# Patient Record
Sex: MALE | Race: WHITE | Employment: OTHER | URBAN - METROPOLITAN AREA
[De-identification: names, ages, dates, MRNs, and addresses within clinical notes are randomized per-mention and may not be internally consistent; named-entity substitution may affect disease eponyms.]

---

## 2017-01-23 ENCOUNTER — OFFICE VISIT (OUTPATIENT)
Dept: ONCOLOGY | Age: 72
End: 2017-01-23

## 2017-01-23 VITALS
OXYGEN SATURATION: 99 % | TEMPERATURE: 98.1 F | DIASTOLIC BLOOD PRESSURE: 71 MMHG | WEIGHT: 130 LBS | SYSTOLIC BLOOD PRESSURE: 145 MMHG | BODY MASS INDEX: 18.61 KG/M2 | HEIGHT: 70 IN | HEART RATE: 71 BPM | RESPIRATION RATE: 18 BRPM

## 2017-01-23 DIAGNOSIS — C32.9 LARYNGEAL SQUAMOUS CELL CARCINOMA (HCC): Primary | ICD-10-CM

## 2017-01-23 NOTE — PROGRESS NOTES
Rogerio Nash is a 66 y.o. male here for follow up had concerns including Follow-up. HIPAA verified by two patient identifiers. ,C/O INSOMNIA      Mr. Shona Harrison has a reminder for a \"due or due soon\" health maintenance. I have asked that he contact his primary care provider for follow-up on this health maintenance.

## 2017-01-23 NOTE — PROGRESS NOTES
Follow-up Note        Patient: Pilar Moon MRN: [id number]  SSN: [id number]    YOB: 1945  Age: 66 y.o. Sex: male        Diagnosis:     1. 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 Concurrent chemo/rads with cisplatin, completed 10/2016      Subjective:      Pilar Moon is a 66 y.o. male with a diagnosis of squamous cell laryngeal carcinoma. He was diagnosed by Dr. Alicia in July 2016. He has long history of pipe smoking. He quit all smoking more than 30 years ago. Mr. Jean Paul Duarte received concurrent radiation with weekly Cisplatin. CT shows resolving laryngeal mass. Laryngoscopy by Dr. Alicia on 1/19/2017 shows no tumor. He notes some taste alterations and excess mucus but overall feels well. Review of Systems:     Constitutional: negative  Eyes: negative  Ears, Nose, Mouth, Throat, and Face: none  Respiratory: negative  Cardiovascular: negative  Gastrointestinal: poor taste  Genitourinary:negative  Integument/Breast: negative  Hematologic/Lymphatic: negative  Musculoskeletal:negative  Neurological: negative      Past Medical History   Diagnosis Date    HTN (hypertension)     Hypercholesterolemia     Hyperlipidemia      Past Surgical History   Procedure Laterality Date    Hx orthopaedic       Back surgery x3      Family History   Problem Relation Age of Onset    Dementia Mother     Alcohol abuse Father      Social History   Substance Use Topics    Smoking status: Former Smoker    Smokeless tobacco: Never Used    Alcohol use No      Prior to Admission medications    Medication Sig Start Date End Date Taking?  Authorizing Provider   pravastatin (PRAVACHOL) 40 mg tablet TAKE ONE TABLET BY MOUTH ONCE DAILY FOR CHOLESTEROL 10/31/16  Yes Jered Ramos MD   ALPRAZolam Acosta Re) 1 mg tablet TAKE ONE-HALF TO ONE TABLET BY MOUTH EVERY 12 HOURS AS NEEDED 9/13/16  Yes Jered Ramos MD   ondansetron (ZOFRAN ODT) 4 mg disintegrating tablet Take 1 Tab by mouth every eight (8) hours as needed for Nausea. 9/6/16  Yes Angela Nones, NP   fluticasone (FLONASE) 50 mcg/actuation nasal spray USE TWO SPRAY(S) IN EACH NOSTRIL ONCE DAILY 7/6/16  Yes Juventino Galvez MD   amLODIPine (NORVASC) 5 mg tablet TAKE ONE TABLET BY MOUTH ONCE DAILY FOR BLOOD PRESSURE 2/1/16  Yes Juventino Galvez MD   hydrochlorothiazide (HYDRODIURIL) 25 mg tablet TAKE ONE TABLET BY MOUTH ONCE DAILY FOR BLOOD PRESSURE 10/14/15  Yes Juventino Galvez MD          Allergies   Allergen Reactions    Morphine Rash    Lisinopril Other (comments)     dizziness    Propranolol Other (comments)     fatigue           Objective:     Vitals:    01/23/17 1048   BP: 145/71   Pulse: 71   Resp: 18   Temp: 98.1 °F (36.7 °C)   SpO2: 99%   Weight: 130 lb (59 kg)   Height: 5' 10\" (1.778 m)        Physical Exam:    GENERAL: alert, cooperative  HEENT: throat dry  LYMPHATIC: Cervical, supraclavicular, and axillary nodes normal.   THROAT & NECK:   LUNG: clear to auscultation bilaterally  HEART: regular rate and rhythm  ABDOMEN: soft, non-tender  EXTREMITIES: no cyanosis or edema  SKIN: Normal.  NEUROLOGIC: negative        Assessment:     1. 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 Completed concurrent chemo/rads 10/2016   Cisplatin weekly X 6    CT shows excellent response  Laryngoscopy on 1/19/2017 shows no tumor  Asymptomatic  In remission        Plan:       > PET CT  > CT neck in 3 months  > Follow-up appointment in 3 months         Signed by: Hay Causey MD                     January 23, 2017          CC. Adrienne Man MD ([facility])  CC. Juventino Galvez MD  CC.  Rodriguez Figueroa MD

## 2017-02-16 ENCOUNTER — HOSPITAL ENCOUNTER (OUTPATIENT)
Dept: PET IMAGING | Age: 72
Discharge: HOME | End: 2017-02-16
Attending: INTERNAL MEDICINE
Payer: MEDICARE

## 2017-02-16 ENCOUNTER — HOSPITAL ENCOUNTER (OUTPATIENT)
Dept: CT IMAGING | Age: 72
Discharge: HOME | End: 2017-02-16
Attending: INTERNAL MEDICINE
Payer: MEDICARE

## 2017-02-16 DIAGNOSIS — C32.9 LARYNGEAL SQUAMOUS CELL CARCINOMA (HCC): ICD-10-CM

## 2017-02-16 LAB — CREAT BLD-MCNC: 0.8 MG/DL (ref 0.6–1.3)

## 2017-02-16 PROCEDURE — 74011250636 HC RX REV CODE- 250/636: Performed by: INTERNAL MEDICINE

## 2017-02-16 PROCEDURE — A9552 F18 FDG: HCPCS

## 2017-02-16 PROCEDURE — 70491 CT SOFT TISSUE NECK W/DYE: CPT

## 2017-02-16 PROCEDURE — 74011636320 HC RX REV CODE- 636/320: Performed by: INTERNAL MEDICINE

## 2017-02-16 PROCEDURE — 82565 ASSAY OF CREATININE: CPT

## 2017-02-16 RX ORDER — SODIUM CHLORIDE 9 MG/ML
50 INJECTION, SOLUTION INTRAVENOUS
Status: COMPLETED | OUTPATIENT
Start: 2017-02-16 | End: 2017-02-16

## 2017-02-16 RX ORDER — SODIUM CHLORIDE 0.9 % (FLUSH) 0.9 %
10 SYRINGE (ML) INJECTION
Status: COMPLETED | OUTPATIENT
Start: 2017-02-16 | End: 2017-02-16

## 2017-02-16 RX ADMIN — Medication 10 ML: at 09:04

## 2017-02-16 RX ADMIN — SODIUM CHLORIDE 50 ML/HR: 900 INJECTION, SOLUTION INTRAVENOUS at 09:04

## 2017-02-16 RX ADMIN — IOPAMIDOL 100 ML: 612 INJECTION, SOLUTION INTRAVENOUS at 09:04

## 2017-02-16 RX ADMIN — Medication 10 ML: at 10:00

## 2017-03-10 ENCOUNTER — OFFICE VISIT (OUTPATIENT)
Dept: FAMILY MEDICINE CLINIC | Age: 72
End: 2017-03-10

## 2017-03-10 VITALS
WEIGHT: 130 LBS | OXYGEN SATURATION: 95 % | HEART RATE: 79 BPM | HEIGHT: 70 IN | DIASTOLIC BLOOD PRESSURE: 69 MMHG | TEMPERATURE: 96.3 F | BODY MASS INDEX: 18.61 KG/M2 | SYSTOLIC BLOOD PRESSURE: 123 MMHG | RESPIRATION RATE: 20 BRPM

## 2017-03-10 DIAGNOSIS — I10 ESSENTIAL HYPERTENSION: ICD-10-CM

## 2017-03-10 DIAGNOSIS — C32.9 LARYNGEAL SQUAMOUS CELL CARCINOMA (HCC): ICD-10-CM

## 2017-03-10 DIAGNOSIS — J06.9 UPPER RESPIRATORY TRACT INFECTION, UNSPECIFIED TYPE: ICD-10-CM

## 2017-03-10 DIAGNOSIS — B37.0 ORAL CANDIDA: Primary | ICD-10-CM

## 2017-03-10 LAB
S PYO AG THROAT QL: NEGATIVE
VALID INTERNAL CONTROL?: YES

## 2017-03-10 RX ORDER — PROMETHAZINE HYDROCHLORIDE AND CODEINE PHOSPHATE 6.25; 1 MG/5ML; MG/5ML
5 SOLUTION ORAL
Qty: 180 ML | Refills: 0 | Status: SHIPPED | OUTPATIENT
Start: 2017-03-10 | End: 2017-04-24

## 2017-03-10 RX ORDER — NYSTATIN 100000 [USP'U]/ML
500000 SUSPENSION ORAL 4 TIMES DAILY
Qty: 473 ML | Refills: 0 | Status: SHIPPED | OUTPATIENT
Start: 2017-03-10 | End: 2017-03-24

## 2017-03-10 RX ORDER — AZITHROMYCIN 250 MG/1
TABLET, FILM COATED ORAL
Qty: 6 TAB | Refills: 0 | Status: SHIPPED | OUTPATIENT
Start: 2017-03-10 | End: 2017-03-15

## 2017-03-10 NOTE — PATIENT INSTRUCTIONS

## 2017-03-10 NOTE — PROGRESS NOTES
HISTORY OF PRESENT ILLNESS  Mary Caputo is a 66 y.o. male. HPI  Patient comes in today for dry, sore mouth. States he had radiation treatment and chemo in Sept/Oct 2016 for throat cancer. Has had dry mouth since radiation treatment. Has been using MuGard during RT and using biotene rinses. Also complains of cough, states he coughs all night, not sleeping. Trying te seltzer plus, nyquil with minimal relief. Appetite fair to poor due to dryness and soreness of mouth/throat. Allergies   Allergen Reactions    Morphine Rash    Lisinopril Other (comments)     dizziness    Propranolol Other (comments)     fatigue       Past Medical History:   Diagnosis Date    HTN (hypertension)     Hypercholesterolemia     Hyperlipidemia     Laryngeal squamous cell carcinoma ([facility] [address] 2016       Past Surgical History:   Procedure Laterality Date    HX ORTHOPAEDIC      Back surgery x3       Social History     Social History    Marital status:      Spouse name: N/A    Number of children: N/A    Years of education: N/A     Occupational History    Not on file. Social History Main Topics    Smoking status: Former Smoker    Smokeless tobacco: Never Used    Alcohol use No    Drug use: No    Sexual activity: Yes     Partners: Female     Other Topics Concern    Not on file     Social History Narrative       Family History   Problem Relation Age of Onset    Dementia Mother     Alcohol abuse Father        Current Outpatient Prescriptions   Medication Sig    amLODIPine (NORVASC) 5 mg tablet TAKE ONE TABLET BY MOUTH ONCE DAILY FOR BLOOD PRESSURE    pravastatin (PRAVACHOL) 40 mg tablet TAKE ONE TABLET BY MOUTH ONCE DAILY FOR CHOLESTEROL    ALPRAZolam (XANAX) 1 mg tablet TAKE ONE-HALF TO ONE TABLET BY MOUTH EVERY 12 HOURS AS NEEDED    ondansetron (ZOFRAN ODT) 4 mg disintegrating tablet Take 1 Tab by mouth every eight (8) hours as needed for Nausea.     fluticasone (FLONASE) 50 mcg/actuation nasal spray USE TWO SPRAY(S) IN EACH NOSTRIL ONCE DAILY    hydrochlorothiazide (HYDRODIURIL) 25 mg tablet TAKE ONE TABLET BY MOUTH ONCE DAILY FOR BLOOD PRESSURE     No current facility-administered medications for this visit. Review of Systems   Constitutional: Positive for weight loss. Negative for chills, diaphoresis, fever and malaise/fatigue. HENT: Positive for sore throat. Negative for congestion, ear pain and tinnitus. Respiratory: Positive for cough, sputum production and shortness of breath. Negative for wheezing. Cardiovascular: Negative for chest pain and palpitations. Gastrointestinal: Negative for abdominal pain, diarrhea, nausea and vomiting. Genitourinary: Negative for dysuria, flank pain, frequency, hematuria and urgency. Musculoskeletal: Negative for myalgias. Skin: Negative. Neurological: Negative for dizziness, tingling, sensory change, speech change and focal weakness. Psychiatric/Behavioral: Negative for depression. The patient is not nervous/anxious and does not have insomnia. Vitals:    03/10/17 0914   BP: 123/69   Pulse: 79   Resp: 20   Temp: 96.3 °F (35.7 °C)   TempSrc: Oral   SpO2: 95%   Weight: 130 lb (59 kg)   Height: 5' 10\" (1.778 m)     Physical Exam   Constitutional: He is oriented to person, place, and time. He appears well-developed. He appears cachectic. He is cooperative. HENT:   Mouth/Throat: Uvula is midline. Mucous membranes are dry. White patches on tongue   Cardiovascular: Normal rate, regular rhythm and normal heart sounds. Pulmonary/Chest: Effort normal. He has no decreased breath sounds. He has no wheezes. He has rhonchi. Neurological: He is alert and oriented to person, place, and time. Skin: Skin is warm and dry. Psychiatric: He has a normal mood and affect. His behavior is normal. Judgment and thought content normal.     ASSESSMENT and PLAN    ICD-10-CM ICD-9-CM    1.  Oral candida B37.0 112.0 nystatin (MYCOSTATIN) 100,000 unit/mL suspension 2. Upper respiratory tract infection, unspecified type J06.9 465.9 AMB POC RAPID STREP A      azithromycin (ZITHROMAX) 250 mg tablet      promethazine-codeine (PHENERGAN WITH CODEINE) 6.25-10 mg/5 mL syrup   3. Essential hypertension I10 401.9 CBC WITH AUTOMATED DIFF      METABOLIC PANEL, COMPREHENSIVE      LIPID PANEL   4. Laryngeal squamous cell carcinoma (HCC) C32.9 161.9 THYROID PANEL W/TSH      CBC WITH AUTOMATED DIFF     Encounter Diagnoses   Name Primary?  Oral candida Yes    Upper respiratory tract infection, unspecified type     Essential hypertension     Laryngeal squamous cell carcinoma (HCC)      Orders Placed This Encounter    THYROID PANEL W/TSH    CBC WITH AUTOMATED DIFF    METABOLIC PANEL, COMPREHENSIVE    LIPID PANEL    CVD REPORT    AMB POC RAPID STREP A    azithromycin (ZITHROMAX) 250 mg tablet    nystatin (MYCOSTATIN) 100,000 unit/mL suspension    promethazine-codeine (PHENERGAN WITH CODEINE) 6.25-10 mg/5 mL syrup     Hermann Cedeno was seen today for sore throat. Diagnoses and all orders for this visit:    Oral candida - due to dry mouth secondary to radiation treatment for laryngeal SCC. Continue Biotene rinses, add nystatin S&S for 2 weeks  -     nystatin (MYCOSTATIN) 100,000 unit/mL suspension; Take 5 mL by mouth four (4) times daily for 14 days. swish and spit    Upper respiratory tract infection, unspecified type  -     AMB POC RAPID STREP A  -     azithromycin (ZITHROMAX) 250 mg tablet; Take 2 tablets today, then take 1 tablet daily  -     promethazine-codeine (PHENERGAN WITH CODEINE) 6.25-10 mg/5 mL syrup; Take 5 mL by mouth every six (6) hours as needed for Cough. Max Daily Amount: 20 mL. Essential hypertension - stable  -     CBC WITH AUTOMATED DIFF  -     METABOLIC PANEL, COMPREHENSIVE  -     LIPID PANEL    Laryngeal squamous cell carcinoma (HCC) - per oncology and rad oncology. Will check TFTs due to radiation treatment to neck area.   -     THYROID PANEL W/TSH  - CBC WITH AUTOMATED DIFF      Follow-up Disposition:  Return if symptoms worsen or fail to improve. I have reviewed the patient's allergies and made any necessary changes. Medical, procedural, social and family histories have been reviewed and updated as medically indicated. I have reconciled and/or revised patient medications in the EMR. I have discussed each diagnosis listed in this note with Serafin Anderson and/or their family. I have discussed treatment options and the risk/benefit analysis of those options, including safe use of medications and possible medication side effects. Through the use of shared decision making we have agreed to the above plan. The patient has received an after-visit summary and questions were answered concerning future plans. Payal Edge, KRIS-C    This note will not be viewable in [employer].

## 2017-03-10 NOTE — MR AVS SNAPSHOT
Visit Information Date & Time Provider Department Dept. Phone Encounter #  
 3/10/2017  9:00 AM Shalom Leblanc [phone] [id number] Follow-up Instructions Return if symptoms worsen or fail to improve. Your Appointments 4/24/2017  9:00 AM  
ESTABLISHED PATIENT with Darleen Mcclelland MD  
[address] [facility]) Appt Note: 3 mth f/u; 3 mth f/u  
 [address] [facility] [address] [phone]  
[address] [place] [address] [address] [facility] [address] Toby Ward Upcoming Health Maintenance Date Due  
 GLAUCOMA SCREENING Q2Y 6/9/2009 MEDICARE YEARLY EXAM 6/9/2009 COLONOSCOPY 6/30/2017* Pneumococcal 65+ High/Highest Risk (2 of 2 - PPSV23) 5/5/2017 DTaP/Tdap/Td series (2 - Td) 10/14/2025 *Topic was postponed. The date shown is not the original due date. Allergies as of 3/10/2017  Review Complete On: 2/16/2017 By: Brodie Dennis Severity Noted Reaction Type Reactions Morphine High 03/08/2010    Rash Lisinopril  09/07/2011   Side Effect Other (comments)  
 dizziness Propranolol  03/09/2011   Side Effect Other (comments)  
 fatigue Current Immunizations  Reviewed on 10/18/2016 Name Date Tdap 10/14/2015 Not reviewed this visit You Were Diagnosed With   
  
 Codes Comments Sore throat    -  Primary ICD-10-CM: J02.9 ICD-9-CM: 363 Upper respiratory tract infection, unspecified type     ICD-10-CM: J06.9 ICD-9-CM: 465.9 Oral candida     ICD-10-CM: B37.0 ICD-9-CM: 112.0 Essential hypertension     ICD-10-CM: I10 
ICD-9-CM: 401.9 Laryngeal squamous cell carcinoma (HCC)     ICD-10-CM: C32.9 ICD-9-CM: 161.9 Vitals BP Pulse Temp Resp Height(growth percentile) Weight(growth percentile) 123/69 79 96.3 °F (35.7 °C) (Oral) 20 5' 10\" (1.778 m) 130 lb (59 kg) SpO2 BMI Smoking Status 95% 18.65 kg/m2 Former Smoker BMI and BSA Data Body Mass Index Body Surface Area  
 18.65 kg/m 2 1.71 m 2 Preferred Pharmacy Pharmacy Name Phone [facility] [address] [phone] Your Updated Medication List  
  
   
This list is accurate as of: 3/10/17 10:18 AM.  Always use your most recent med list.  
  
  
  
  
 ALPRAZolam 1 mg tablet Commonly known as:  XANAX  
TAKE ONE-HALF TO ONE TABLET BY MOUTH EVERY 12 HOURS AS NEEDED  
  
 amLODIPine 5 mg tablet Commonly known as:  Faith Kristy TAKE ONE TABLET BY MOUTH ONCE DAILY FOR BLOOD PRESSURE  
  
 azithromycin 250 mg tablet Commonly known as:  Niaveronica Blander Take 2 tablets today, then take 1 tablet daily  
  
 fluticasone 50 mcg/actuation nasal spray Commonly known as:  FLONASE  
USE TWO SPRAY(S) IN EACH NOSTRIL ONCE DAILY  
  
 hydroCHLOROthiazide 25 mg tablet Commonly known as:  HYDRODIURIL  
TAKE ONE TABLET BY MOUTH ONCE DAILY FOR BLOOD PRESSURE  
  
 nystatin 100,000 unit/mL suspension Commonly known as:  MYCOSTATIN Take 5 mL by mouth four (4) times daily for 14 days. swish and spit  
  
 pravastatin 40 mg tablet Commonly known as:  PRAVACHOL  
TAKE ONE TABLET BY MOUTH ONCE DAILY FOR CHOLESTEROL  
  
 promethazine-codeine 6.25-10 mg/5 mL syrup Commonly known as:  PHENERGAN with CODEINE Take 5 mL by mouth every six (6) hours as needed for Cough. Max Daily Amount: 20 mL. Prescriptions Printed Refills  
 promethazine-codeine (PHENERGAN WITH CODEINE) 6.25-10 mg/5 mL syrup 0 Sig: Take 5 mL by mouth every six (6) hours as needed for Cough. Max Daily Amount: 20 mL. Class: Print Route: Oral  
  
Prescriptions Sent to Pharmacy Refills  
 azithromycin (ZITHROMAX) 250 mg tablet 0 Sig: Take 2 tablets today, then take 1 tablet daily  Class: Normal  
 Pharmacy: [facility] [address] , [address] Ph #: [phone]  
 nystatin (MYCOSTATIN) 100,000 unit/mL suspension 0 Sig: Take 5 mL by mouth four (4) times daily for 14 days. swish and spit Class: Normal  
 Pharmacy: [address] , [address] [place] [address] Avenue Ph #: [phone] Route: Oral  
  
We Performed the Following AMB POC RAPID STREP A [48245 CPT(R)] CBC WITH AUTOMATED DIFF [69208 CPT(R)] LIPID PANEL [34898 CPT(R)] METABOLIC PANEL, COMPREHENSIVE [45691 CPT(R)] THYROID PANEL W/TSH [45105 CPT(R)] Follow-up Instructions Return if symptoms worsen or fail to improve. Patient Instructions Upper Respiratory Infection (Cold): Care Instructions Your Care Instructions An upper respiratory infection, or URI, is an infection of the nose, sinuses, or throat. URIs are spread by coughs, sneezes, and direct contact. The common cold is the most frequent kind of URI. The flu and sinus infections are other kinds of URIs. Almost all URIs are caused by viruses. Antibiotics won't cure them. But you can treat most infections with home care. This may include drinking lots of fluids and taking over-the-counter pain medicine. You will probably feel better in 4 to 10 days. The doctor has checked you carefully, but problems can develop later. If you notice any problems or new symptoms, get medical treatment right away. Follow-up care is a key part of your treatment and safety. Be sure to make and go to all appointments, and call your doctor if you are having problems. It's also a good idea to know your test results and keep a list of the medicines you take. How can you care for yourself at home? · To prevent dehydration, drink plenty of fluids, enough so that your urine is light yellow or clear like water. Choose water and other caffeine-free clear liquids until you feel better.  If you have kidney, heart, or liver disease and have to limit fluids, talk with your doctor before you increase the amount of fluids you drink. · Take an over-the-counter pain medicine, such as acetaminophen (Tylenol), ibuprofen (Advil, Motrin), or naproxen (Aleve). Read and follow all instructions on the label. · Before you use cough and cold medicines, check the label. These medicines may not be safe for young children or for people with certain health problems. · Be careful when taking over-the-counter cold or flu medicines and Tylenol at the same time. Many of these medicines have acetaminophen, which is Tylenol. Read the labels to make sure that you are not taking more than the recommended dose. Too much acetaminophen (Tylenol) can be harmful. · Get plenty of rest. 
· Do not smoke or allow others to smoke around you. If you need help quitting, talk to your doctor about stop-smoking programs and medicines. These can increase your chances of quitting for good. When should you call for help? Call 911 anytime you think you may need emergency care. For example, call if: 
· You have severe trouble breathing. Call your doctor now or seek immediate medical care if: 
· You seem to be getting much sicker. · You have new or worse trouble breathing. · You have a new or higher fever. · You have a new rash. Watch closely for changes in your health, and be sure to contact your doctor if: 
· You have a new symptom, such as a sore throat, an earache, or sinus pain. · You cough more deeply or more often, especially if you notice more mucus or a change in the color of your mucus. · You do not get better as expected. Where can you learn more? Go to http://dora-vicki.info/. Enter S293 in the search box to learn more about \"Upper Respiratory Infection (Cold): Care Instructions. \" Current as of: June 30, 2016 Content Version: 11.1 © 5754-6056 [employer].  Care instructions adapted under license by [employer] (which disclaims liability or warranty for this information). If you have questions about a medical condition or this instruction, always ask your healthcare professional. [address] any warranty or liability for your use of this information. Introducing [facility]! Dear Easton Wu: Thank you for requesting a [employer] account. Our records indicate that you already have an active [employer] account. You can access your account anytime at [email or web address] Pro Did you know that you can access your hospital and ER discharge instructions at any time in [employer]? You can also review all of your test results from your hospital stay or ER visit. Additional Information If you have questions, please visit the Frequently Asked Questions section of the [employer] website at [email or web address] Remember, [employer] is NOT to be used for urgent needs. For medical emergencies, dial 911. Now available from your iPhone and Android! Please provide this summary of care documentation to your next provider. Your primary care clinician is listed as Dorethea Hamman. If you have any questions after today's visit, please call [phone].

## 2017-03-11 LAB
ALBUMIN SERPL-MCNC: 4 G/DL (ref 3.5–4.8)
ALBUMIN/GLOB SERPL: 1.3 {RATIO} (ref 1.1–2.5)
ALP SERPL-CCNC: 57 IU/L (ref 39–117)
ALT SERPL-CCNC: 20 IU/L (ref 0–44)
AST SERPL-CCNC: 25 IU/L (ref 0–40)
BASOPHILS # BLD AUTO: 0 X10E3/UL (ref 0–0.2)
BASOPHILS NFR BLD AUTO: 0 %
BILIRUB SERPL-MCNC: 0.3 MG/DL (ref 0–1.2)
BUN SERPL-MCNC: 9 MG/DL (ref 8–27)
BUN/CREAT SERPL: 15 (ref 10–22)
CALCIUM SERPL-MCNC: 9.3 MG/DL (ref 8.6–10.2)
CHLORIDE SERPL-SCNC: 83 MMOL/L (ref 96–106)
CHOLEST SERPL-MCNC: 134 MG/DL (ref 100–199)
CO2 SERPL-SCNC: 25 MMOL/L (ref 18–29)
CREAT SERPL-MCNC: 0.62 MG/DL (ref 0.76–1.27)
EOSINOPHIL # BLD AUTO: 0.2 X10E3/UL (ref 0–0.4)
EOSINOPHIL NFR BLD AUTO: 2 %
ERYTHROCYTE [DISTWIDTH] IN BLOOD BY AUTOMATED COUNT: 13.3 % (ref 12.3–15.4)
FT4I SERPL CALC-MCNC: 1.7 (ref 1.2–4.9)
GLOBULIN SER CALC-MCNC: 3.1 G/DL (ref 1.5–4.5)
GLUCOSE SERPL-MCNC: 96 MG/DL (ref 65–99)
HCT VFR BLD AUTO: 34.8 % (ref 37.5–51)
HDLC SERPL-MCNC: 30 MG/DL
HGB BLD-MCNC: 12.3 G/DL (ref 12.6–17.7)
IMM GRANULOCYTES # BLD: 0 X10E3/UL (ref 0–0.1)
IMM GRANULOCYTES NFR BLD: 0 %
INTERPRETATION, 910389: NORMAL
LDLC SERPL CALC-MCNC: 84 MG/DL (ref 0–99)
LYMPHOCYTES # BLD AUTO: 1 X10E3/UL (ref 0.7–3.1)
LYMPHOCYTES NFR BLD AUTO: 13 %
MCH RBC QN AUTO: 30.4 PG (ref 26.6–33)
MCHC RBC AUTO-ENTMCNC: 35.3 G/DL (ref 31.5–35.7)
MCV RBC AUTO: 86 FL (ref 79–97)
MONOCYTES # BLD AUTO: 0.2 X10E3/UL (ref 0.1–0.9)
MONOCYTES NFR BLD AUTO: 2 %
NEUTROPHILS # BLD AUTO: 6.2 X10E3/UL (ref 1.4–7)
NEUTROPHILS NFR BLD AUTO: 83 %
PLATELET # BLD AUTO: 276 X10E3/UL (ref 150–379)
POTASSIUM SERPL-SCNC: 4.5 MMOL/L (ref 3.5–5.2)
PROT SERPL-MCNC: 7.1 G/DL (ref 6–8.5)
RBC # BLD AUTO: 4.04 X10E6/UL (ref 4.14–5.8)
SODIUM SERPL-SCNC: 125 MMOL/L (ref 134–144)
T3RU NFR SERPL: 30 % (ref 24–39)
T4 SERPL-MCNC: 5.6 UG/DL (ref 4.5–12)
TRIGL SERPL-MCNC: 100 MG/DL (ref 0–149)
TSH SERPL DL<=0.005 MIU/L-ACNC: 6.4 UIU/ML (ref 0.45–4.5)
VLDLC SERPL CALC-MCNC: 20 MG/DL (ref 5–40)
WBC # BLD AUTO: 7.5 X10E3/UL (ref 3.4–10.8)

## 2017-04-24 ENCOUNTER — OFFICE VISIT (OUTPATIENT)
Dept: ONCOLOGY | Age: 72
End: 2017-04-24

## 2017-04-24 VITALS
WEIGHT: 128.8 LBS | DIASTOLIC BLOOD PRESSURE: 75 MMHG | RESPIRATION RATE: 18 BRPM | BODY MASS INDEX: 18.44 KG/M2 | OXYGEN SATURATION: 99 % | SYSTOLIC BLOOD PRESSURE: 132 MMHG | TEMPERATURE: 97.4 F | HEIGHT: 70 IN | HEART RATE: 70 BPM

## 2017-04-24 DIAGNOSIS — C32.9 LARYNGEAL SQUAMOUS CELL CARCINOMA (HCC): Primary | ICD-10-CM

## 2017-04-24 NOTE — PROGRESS NOTES
Guille Doe is a 66 y.o. male had concerns including Follow-up. for Laryngeal Squamos cell carcinoma, HIPAA verified by two patient identifiers. C/o mild fatigue,and sob upon exertion, denies pain. Mr. Clari Hardin has a reminder for a \"due or due soon\" health maintenance. I have asked that he contact his primary care provider for follow-up on this health maintenance.

## 2017-04-24 NOTE — PROGRESS NOTES
Follow-up Note        Patient: Barrett Whiteside MRN: [id number]  SSN: [id number]    YOB: 1945  Age: 66 y.o. Sex: male        Diagnosis:     1. 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 Concurrent chemo/rads with cisplatin, completed 10/2016    Subjective:      Barrett Whiteside is a 66 y.o. male with a diagnosis of squamous cell laryngeal carcinoma. He was diagnosed by Dr. Alicia in July 2016. He has long history of pipe smoking. He quit all smoking more than 30 years ago. Mr. Nazia Severino received concurrent radiation with weekly Cisplatin. CT shows resolving laryngeal mass. Laryngoscopy by Dr. Alicia on 1/19/2017 shows no tumor. PET CT shows complete metabolic response. He notes some taste alterations and excess oral mucus but overall feels well. Review of Systems:     Constitutional: negative  Eyes: negative  Ears, Nose, Mouth, Throat, and Face: none  Respiratory: negative  Cardiovascular: negative  Gastrointestinal: poor taste  Genitourinary:negative  Integument/Breast: negative  Hematologic/Lymphatic: negative  Musculoskeletal:negative  Neurological: negative      Past Medical History:   Diagnosis Date    HTN (hypertension)     Hypercholesterolemia     Hyperlipidemia     Laryngeal squamous cell carcinoma ([facility] [address] 2016     Past Surgical History:   Procedure Laterality Date    HX ORTHOPAEDIC      Back surgery x3      Family History   Problem Relation Age of Onset    Dementia Mother     Alcohol abuse Father      Social History   Substance Use Topics    Smoking status: Former Smoker    Smokeless tobacco: Never Used    Alcohol use No      Prior to Admission medications    Medication Sig Start Date End Date Taking?  Authorizing Provider   amLODIPine (NORVASC) 5 mg tablet TAKE ONE TABLET BY MOUTH ONCE DAILY FOR BLOOD PRESSURE 1/26/17  Yes Leydi Morales MD   pravastatin (PRAVACHOL) 40 mg tablet TAKE ONE TABLET BY MOUTH ONCE DAILY FOR CHOLESTEROL 1/26/17  Yes Jonathan Richardson MD   ALPRAZolam Bree Stager) 1 mg tablet TAKE ONE-HALF TO ONE TABLET BY MOUTH EVERY 12 HOURS AS NEEDED 9/13/16  Yes Jonathan Richardson MD   hydrochlorothiazide (HYDRODIURIL) 25 mg tablet TAKE ONE TABLET BY MOUTH ONCE DAILY FOR BLOOD PRESSURE 10/14/15  Yes Jonathan Richardson MD          Allergies   Allergen Reactions    Morphine Rash    Lisinopril Other (comments)     dizziness    Propranolol Other (comments)     fatigue           Objective:     Vitals:    04/24/17 0853   BP: 132/75   Pulse: 70   Resp: 18   Temp: 97.4 °F (36.3 °C)   SpO2: 99%   Weight: 128 lb 12.8 oz (58.4 kg)   Height: 5' 10\" (1.778 m)        Physical Exam:    GENERAL: alert, cooperative  HEENT: throat dry  LYMPHATIC: Cervical, supraclavicular, and axillary nodes normal.   THROAT & NECK: oral thrush  LUNG: clear to auscultation bilaterally  HEART: regular rate and rhythm  ABDOMEN: soft, non-tender  EXTREMITIES: no cyanosis or edema  SKIN: Normal.  NEUROLOGIC: negative        Assessment:     1. 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 Completed concurrent chemo/rads 10/2016   Cisplatin weekly X 6    CT shows excellent response  Laryngoscopy on 1/19/2017 shows no tumor  PET CT - complete remission  Asymptomatic  In remission      2. Oral Thrush    Use nystatin swish and spit (has it at home)      Plan:       > Nystatin  > port removal  > Repeat CT soft tissue neck and CT chest   > Follow-up appointment in 3 months         Signed by: Junior Jose MD                     April 24, 2017          CC. Barrett Tsai MD ([facility])  CC. Jonathan Richardson MD  CC.  Rickey Nix MD

## 2017-04-25 RX ORDER — ALPRAZOLAM 1 MG/1
TABLET ORAL
Qty: 60 TAB | Refills: 0 | Status: SHIPPED | OUTPATIENT
Start: 2017-04-25 | End: 2017-08-29 | Stop reason: SDUPTHER

## 2017-04-26 ENCOUNTER — TELEPHONE (OUTPATIENT)
Dept: FAMILY MEDICINE CLINIC | Age: 72
End: 2017-04-26

## 2017-06-30 DIAGNOSIS — C32.9 LARYNGEAL SQUAMOUS CELL CARCINOMA (HCC): Primary | ICD-10-CM

## 2017-07-05 ENCOUNTER — HOSPITAL ENCOUNTER (OUTPATIENT)
Dept: LAB | Age: 72
Discharge: HOME | End: 2017-07-05
Payer: MEDICARE

## 2017-07-05 PROCEDURE — 85025 COMPLETE CBC W/AUTO DIFF WBC: CPT

## 2017-07-05 PROCEDURE — 36415 COLL VENOUS BLD VENIPUNCTURE: CPT

## 2017-07-05 PROCEDURE — 80053 COMPREHEN METABOLIC PANEL: CPT

## 2017-07-06 ENCOUNTER — HOSPITAL ENCOUNTER (OUTPATIENT)
Dept: CT IMAGING | Age: 72
Discharge: HOME | End: 2017-07-06
Attending: INTERNAL MEDICINE
Payer: MEDICARE

## 2017-07-06 ENCOUNTER — HOSPITAL ENCOUNTER (OUTPATIENT)
Dept: INTERVENTIONAL RADIOLOGY/VASCULAR | Age: 72
Discharge: HOME | End: 2017-07-06
Attending: INTERNAL MEDICINE
Payer: MEDICARE

## 2017-07-06 VITALS
HEART RATE: 53 BPM | DIASTOLIC BLOOD PRESSURE: 54 MMHG | BODY MASS INDEX: 18.61 KG/M2 | SYSTOLIC BLOOD PRESSURE: 120 MMHG | RESPIRATION RATE: 20 BRPM | WEIGHT: 130 LBS | TEMPERATURE: 97.5 F | HEIGHT: 70 IN | OXYGEN SATURATION: 100 %

## 2017-07-06 DIAGNOSIS — C32.9 LARYNGEAL SQUAMOUS CELL CARCINOMA (HCC): ICD-10-CM

## 2017-07-06 LAB
ALBUMIN SERPL-MCNC: 4.4 G/DL (ref 3.5–4.8)
ALBUMIN/GLOB SERPL: 1.5 {RATIO} (ref 1.2–2.2)
ALP SERPL-CCNC: 66 IU/L (ref 39–117)
ALT SERPL-CCNC: 14 IU/L (ref 0–44)
AST SERPL-CCNC: 25 IU/L (ref 0–40)
BASOPHILS # BLD AUTO: 0 X10E3/UL (ref 0–0.2)
BASOPHILS NFR BLD AUTO: 1 %
BILIRUB SERPL-MCNC: 0.2 MG/DL (ref 0–1.2)
BUN SERPL-MCNC: 15 MG/DL (ref 8–27)
BUN/CREAT SERPL: 20 (ref 10–24)
CALCIUM SERPL-MCNC: 9.4 MG/DL (ref 8.6–10.2)
CHLORIDE SERPL-SCNC: 95 MMOL/L (ref 96–106)
CO2 SERPL-SCNC: 25 MMOL/L (ref 18–29)
CREAT BLD-MCNC: 0.8 MG/DL (ref 0.6–1.3)
CREAT SERPL-MCNC: 0.76 MG/DL (ref 0.76–1.27)
EOSINOPHIL # BLD AUTO: 0.4 X10E3/UL (ref 0–0.4)
EOSINOPHIL NFR BLD AUTO: 5 %
ERYTHROCYTE [DISTWIDTH] IN BLOOD BY AUTOMATED COUNT: 13.3 % (ref 12.3–15.4)
GLOBULIN SER CALC-MCNC: 2.9 G/DL (ref 1.5–4.5)
GLUCOSE SERPL-MCNC: 104 MG/DL (ref 65–99)
HCT VFR BLD AUTO: 37.6 % (ref 37.5–51)
HGB BLD-MCNC: 12.7 G/DL (ref 12.6–17.7)
IMM GRANULOCYTES # BLD: 0 X10E3/UL (ref 0–0.1)
IMM GRANULOCYTES NFR BLD: 0 %
LYMPHOCYTES # BLD AUTO: 0.6 X10E3/UL (ref 0.7–3.1)
LYMPHOCYTES NFR BLD AUTO: 9 %
MCH RBC QN AUTO: 30.8 PG (ref 26.6–33)
MCHC RBC AUTO-ENTMCNC: 33.8 G/DL (ref 31.5–35.7)
MCV RBC AUTO: 91 FL (ref 79–97)
MONOCYTES # BLD AUTO: 0.5 X10E3/UL (ref 0.1–0.9)
MONOCYTES NFR BLD AUTO: 8 %
NEUTROPHILS # BLD AUTO: 5.1 X10E3/UL (ref 1.4–7)
NEUTROPHILS NFR BLD AUTO: 77 %
PLATELET # BLD AUTO: 207 X10E3/UL (ref 150–379)
POTASSIUM SERPL-SCNC: 4.2 MMOL/L (ref 3.5–5.2)
PROT SERPL-MCNC: 7.3 G/DL (ref 6–8.5)
RBC # BLD AUTO: 4.13 X10E6/UL (ref 4.14–5.8)
SODIUM SERPL-SCNC: 135 MMOL/L (ref 134–144)
WBC # BLD AUTO: 6.6 X10E3/UL (ref 3.4–10.8)

## 2017-07-06 PROCEDURE — 36589 REMOVAL TUNNELED CV CATH: CPT

## 2017-07-06 PROCEDURE — 70491 CT SOFT TISSUE NECK W/DYE: CPT

## 2017-07-06 PROCEDURE — 74011250636 HC RX REV CODE- 250/636: Performed by: RADIOLOGY

## 2017-07-06 PROCEDURE — 71260 CT THORAX DX C+: CPT

## 2017-07-06 PROCEDURE — 77030031139 HC SUT VCRL2 J&J -A

## 2017-07-06 PROCEDURE — 82565 ASSAY OF CREATININE: CPT

## 2017-07-06 PROCEDURE — 74011000250 HC RX REV CODE- 250: Performed by: RADIOLOGY

## 2017-07-06 PROCEDURE — 77030010507 HC ADH SKN DERMBND J&J -B

## 2017-07-06 PROCEDURE — 74011250636 HC RX REV CODE- 250/636: Performed by: INTERNAL MEDICINE

## 2017-07-06 PROCEDURE — 74011636320 HC RX REV CODE- 636/320: Performed by: INTERNAL MEDICINE

## 2017-07-06 RX ORDER — SODIUM CHLORIDE 0.9 % (FLUSH) 0.9 %
10 SYRINGE (ML) INJECTION
Status: COMPLETED | OUTPATIENT
Start: 2017-07-06 | End: 2017-07-06

## 2017-07-06 RX ORDER — HEPARIN 100 UNIT/ML
300 SYRINGE INTRAVENOUS ONCE
Status: DISCONTINUED | OUTPATIENT
Start: 2017-07-06 | End: 2017-07-06

## 2017-07-06 RX ORDER — SODIUM CHLORIDE 9 MG/ML
50 INJECTION, SOLUTION INTRAVENOUS
Status: COMPLETED | OUTPATIENT
Start: 2017-07-06 | End: 2017-07-06

## 2017-07-06 RX ORDER — SODIUM CHLORIDE 9 MG/ML
25 INJECTION, SOLUTION INTRAVENOUS CONTINUOUS
Status: DISCONTINUED | OUTPATIENT
Start: 2017-07-06 | End: 2017-07-06

## 2017-07-06 RX ORDER — FENTANYL CITRATE 50 UG/ML
100 INJECTION, SOLUTION INTRAMUSCULAR; INTRAVENOUS
Status: DISCONTINUED | OUTPATIENT
Start: 2017-07-06 | End: 2017-07-06

## 2017-07-06 RX ORDER — MIDAZOLAM HYDROCHLORIDE 1 MG/ML
5 INJECTION, SOLUTION INTRAMUSCULAR; INTRAVENOUS
Status: DISCONTINUED | OUTPATIENT
Start: 2017-07-06 | End: 2017-07-06

## 2017-07-06 RX ORDER — HEPARIN SODIUM 200 [USP'U]/100ML
400 INJECTION, SOLUTION INTRAVENOUS ONCE
Status: COMPLETED | OUTPATIENT
Start: 2017-07-06 | End: 2017-07-06

## 2017-07-06 RX ORDER — CEFAZOLIN SODIUM IN 0.9 % NACL 2 G/100 ML
2 PLASTIC BAG, INJECTION (ML) INTRAVENOUS ONCE
Status: COMPLETED | OUTPATIENT
Start: 2017-07-06 | End: 2017-07-06

## 2017-07-06 RX ORDER — LIDOCAINE HYDROCHLORIDE 20 MG/ML
18 INJECTION, SOLUTION INFILTRATION; PERINEURAL ONCE
Status: COMPLETED | OUTPATIENT
Start: 2017-07-06 | End: 2017-07-06

## 2017-07-06 RX ORDER — LIDOCAINE HYDROCHLORIDE AND EPINEPHRINE 10; 10 MG/ML; UG/ML
1.5 INJECTION, SOLUTION INFILTRATION; PERINEURAL ONCE
Status: COMPLETED | OUTPATIENT
Start: 2017-07-06 | End: 2017-07-06

## 2017-07-06 RX ADMIN — Medication 10 ML: at 06:56

## 2017-07-06 RX ADMIN — IOPAMIDOL 100 ML: 755 INJECTION, SOLUTION INTRAVENOUS at 06:57

## 2017-07-06 RX ADMIN — FENTANYL CITRATE 50 MCG: 50 INJECTION, SOLUTION INTRAMUSCULAR; INTRAVENOUS at 10:41

## 2017-07-06 RX ADMIN — LIDOCAINE HYDROCHLORIDE AND EPINEPHRINE 15 MG: 10; 10 INJECTION, SOLUTION INFILTRATION; PERINEURAL at 10:53

## 2017-07-06 RX ADMIN — MIDAZOLAM HYDROCHLORIDE 1 MG: 1 INJECTION INTRAMUSCULAR; INTRAVENOUS at 10:41

## 2017-07-06 RX ADMIN — LIDOCAINE HYDROCHLORIDE 400 MG: 20 INJECTION, SOLUTION INFILTRATION; PERINEURAL at 10:53

## 2017-07-06 RX ADMIN — HEPARIN SODIUM IN SODIUM CHLORIDE: 200 INJECTION INTRAVENOUS at 10:54

## 2017-07-06 RX ADMIN — SODIUM BICARBONATE 2 ML: 0.2 INJECTION, SOLUTION INTRAVENOUS at 10:54

## 2017-07-06 RX ADMIN — CEFAZOLIN 2 G: 10 INJECTION, POWDER, FOR SOLUTION INTRAVENOUS; PARENTERAL at 09:01

## 2017-07-06 RX ADMIN — SODIUM CHLORIDE 50 ML/HR: 900 INJECTION, SOLUTION INTRAVENOUS at 06:56

## 2017-07-06 RX ADMIN — SODIUM CHLORIDE 25 ML/HR: 900 INJECTION, SOLUTION INTRAVENOUS at 08:20

## 2017-07-06 RX ADMIN — MIDAZOLAM HYDROCHLORIDE 2 MG: 1 INJECTION INTRAMUSCULAR; INTRAVENOUS at 10:35

## 2017-07-06 RX ADMIN — FENTANYL CITRATE 25 MCG: 50 INJECTION, SOLUTION INTRAMUSCULAR; INTRAVENOUS at 10:35

## 2017-07-06 NOTE — H&P
Radiology History and Physical    Patient: Emigdio Sexton 67 y.o. male     Chief Complaint: No chief complaint on file. History of Present Illness: Head and neck cancer;completion of treatment. History:    Past Medical History:   Diagnosis Date    HTN (hypertension)     Hypercholesterolemia     Hyperlipidemia     Laryngeal squamous cell carcinoma ([facility] [address] 2016     Family History   Problem Relation Age of Onset    Dementia Mother     Alcohol abuse Father      Social History     Social History    Marital status:      Spouse name: N/A    Number of children: N/A    Years of education: N/A     Occupational History    Not on file. Social History Main Topics    Smoking status: Former Smoker    Smokeless tobacco: Never Used    Alcohol use No    Drug use: No    Sexual activity: Yes     Partners: Female     Other Topics Concern    Not on file     Social History Narrative       Allergies: Allergies   Allergen Reactions    Morphine Rash    Lisinopril Other (comments)     dizziness    Propranolol Other (comments)     fatigue       Current Medications:  Current Outpatient Prescriptions   Medication Sig    ALPRAZolam (XANAX) 1 mg tablet TAKE ONE-HALF TO ONE TABLET BY MOUTH EVERY 12 HOURS AS NEEDED    amLODIPine (NORVASC) 5 mg tablet TAKE ONE TABLET BY MOUTH ONCE DAILY FOR BLOOD PRESSURE    pravastatin (PRAVACHOL) 40 mg tablet TAKE ONE TABLET BY MOUTH ONCE DAILY FOR CHOLESTEROL     No current facility-administered medications for this encounter. Physical Exam:  Blood pressure 120/54, pulse (!) 53, temperature 97.5 °F (36.4 °C), resp. rate 20, height 5' 10\" (1.778 m), weight 59 kg (130 lb), SpO2 100 %.   GENERAL: alert, cooperative, no distress, appears stated age, LUNG: clear to auscultation bilaterally, HEART: regular rate and rhythm      Alerts:    Hospital Problems  Date Reviewed: 4/24/2017    None          Laboratory:      Recent Labs      07/05/17   1102   HGB  12.7   HCT  37.6 WBC  6.6   PLT  207   BUN  15   CREA  0.76   K  4.2         Plan of Care/Planned Procedure:  Risks, benefits, and alternatives reviewed with patient and he agrees to proceed with the procedure.

## 2017-07-06 NOTE — PROCEDURES
PROCEDURE:Port removal.  INDICATION:completion of treatment. ANESTHESIA:local and sedation. COMPLICATION:NONE. SPECIMENS REMOVED:none. BLOOD LOSS:NONE. /ASSISTANT:REAGAN Spears RECOMMENDATIONS:none. CONSENT OBTAINED:YES.  NOTES:none.

## 2017-07-06 NOTE — ROUTINE PROCESS
Dr. Cantrell Devoid into speak with pt and procedure explained along with risks and benefits; consent obtained.

## 2017-07-06 NOTE — DISCHARGE INSTRUCTIONS
[address] [facility]  Special Procedures/Angiography Department    Radiologist:  Dr. Amisha Perkins     Date:   07/06/2017     Portacath Removal Discharge Instructions      Watch for signs of infection:  redness, fever, chills, increased pain, and/or drainage from the site. If this occurs, call your physician at once. Return next week for an incision check:  Thursday, July 13,2017  Do not sign in. Go to the podium, and ask them to call our department. Please try to come between 9:00AM and 1:00PM    Keep your dressing clean and dry. Leave the dressing in place for the next  three days    Resume your previous diet and follow medication reconciliation form. Do not lift anything heavier than 5 pounds with the affected arm for the next week. You may take Tylenol, as directed on the label, for pain. Avoid ibuprofen (Advil, Motrin) and aspirin as they may cause you to bleed. Because you received sedation, you are not to drive or sign any legal documents for the next 24 hours.     If you have any questions or concerns, please call [phone] and ask for the nurse on-call

## 2017-07-06 NOTE — IP AVS SNAPSHOT
[address] [facility] 
[phone] Patient: Macrina Rangel MRN: [id number] TIERNEY:[birth year] You are allergic to the following Allergen Reactions Morphine Rash Lisinopril Other (comments)  
 dizziness Propranolol Other (comments)  
 fatigue Recent Documentation Height Weight BMI Smoking Status 1.778 m 59 kg 18.65 kg/m2 Former Smoker Emergency Contacts Name Discharge Info Relation Home Work Mobile Tanika Alfonso DISCHARGE CAREGIVER [3] Spouse [3] [phone] About your hospitalization You were admitted on:  July 6, 2017 You last received care in the:  [facility] RAD ANGIO IR You were discharged on:  July 6, 2017 Unit phone number:  [phone] Why you were hospitalized Your primary diagnosis was:  Not on File Providers Seen During Your Hospitalizations Provider Role Specialty Primary office phone Bijal Bertrand MD Attending Provider Hematology and Oncology [phone] Your Primary Care Physician (PCP) Primary Care Physician Office Phone Office Fax Jaspreetbrittaney Nandini [phone] Follow-up Information None Your Appointments Monday July 24, 2017 10:00 AM EDT Any with Bijal Bertrand MD  
[address] [facility] Oncology at [facility]) [address] [facility]  
[phone] Current Discharge Medication List  
  
ASK your doctor about these medications Dose & Instructions Dispensing Information Comments Morning Noon Evening Bedtime ALPRAZolam 1 mg tablet Commonly known as:  Will Antonio Your last dose was: Your next dose is: TAKE ONE-HALF TO ONE TABLET BY MOUTH EVERY 12 HOURS AS NEEDED Quantity:  60 Tab Refills:  0  
     
   
   
   
  
 amLODIPine 5 mg tablet Commonly known as:  Tanisha David Your last dose was: Your next dose is: TAKE ONE TABLET BY MOUTH ONCE DAILY FOR BLOOD PRESSURE Quantity:  90 Tab Refills:  3  
     
   
   
   
  
 pravastatin 40 mg tablet Commonly known as:  PRAVACHOL Your last dose was: Your next dose is: TAKE ONE TABLET BY MOUTH ONCE DAILY FOR CHOLESTEROL Quantity:  90 Tab Refills:  3 Discharge Instructions Art Elliott [facility] Special Procedures/Angiography Department Radiologist:  Dr. Teri Morgan  
 
Date:   07/06/2017 Portacath Removal Discharge Instructions Watch for signs of infection:  redness, fever, chills, increased pain, and/or drainage from the site. If this occurs, call your physician at once. Return next week for an incision check:  Thursday, July 13,2017 Do not sign in. Go to the podium, and ask them to call our department. Please try to come between 9:00AM and 1:00PM 
 
Keep your dressing clean and dry. Leave the dressing in place for the next  three days Resume your previous diet and follow medication reconciliation form. Do not lift anything heavier than 5 pounds with the affected arm for the next week. You may take Tylenol, as directed on the label, for pain. Avoid ibuprofen (Advil, Motrin) and aspirin as they may cause you to bleed. Because you received sedation, you are not to drive or sign any legal documents for the next 24 hours. If you have any questions or concerns, please call [phone] and ask for the nurse on-call Discharge Orders None ACO Transitions of Care Introducing [employer] [address] Becky Harman offers a voluntary care coordination program to provide high quality service and care to [facility] fee-for-service beneficiaries. [employer] was designed to help you enhance your health and well-being through the following services: ? Transitions of Care  support for individuals who are transitioning from one care setting to another (example: Hospital to home). ? Chronic and Complex Care Coordination  support for individuals and caregivers of those with serious or chronic illnesses or with more than one chronic (ongoing) condition and those who take a number of different medications. If you meet specific medical criteria, a [address] [place] [address] Rd may call you directly to coordinate your care with your primary care physician and your other care providers. For questions about the [facility] programs, please, contact your physicians office. For general questions or additional information about Accountable Care Organizations: 
Please visit www.medicare.gov/acos. html or call 1-800-MEDICARE (5-808.824.2777) TTY users should call 6-825.482.7717. Introducing [facility]! Dear Tierney Sutherland: Thank you for requesting a [employer] account. Our records indicate that you already have an active Bloom[employer] account. You can access your account anytime at [email or web address] [employer] Did you know that you can access your hospital and ER discharge instructions at any time in [employer]? You can also review all of your test results from your hospital stay or ER visit. Additional Information If you have questions, please visit the Frequently Asked Questions section of the [employer] website at [email or web address] Remember, [employer] is NOT to be used for urgent needs. For medical emergencies, dial 911. Now available from your iPhone and Android! General Information Please provide this summary of care documentation to your next provider. Patient Signature:  ____________________________________________________________ Date:  ____________________________________________________________  
  
Jerral Springfield Provider Signature:  ____________________________________________________________ Date:  ____________________________________________________________

## 2017-07-06 NOTE — PROGRESS NOTES
Name of procedure: Permacath removal    Complications, if any, r/t procedure: none    Sedation medications given: 3 mg Versed, 75 mcg Fentanyl    Sedation tolerated: well    VS : Stable    Pt tolerated procedure well. VSS. No C/O pain. Dressing to site D&I. No bleeding or hematoma noted to site. Discharge instructions given to pt by Leah Gramajo RN. Pt verbalized understanding. Copy on chart and copy given to pt. IV D/Cd. PT taken to car by wheelchair and taken home by family.

## 2017-07-24 ENCOUNTER — OFFICE VISIT (OUTPATIENT)
Dept: ONCOLOGY | Age: 72
End: 2017-07-24

## 2017-07-24 VITALS
RESPIRATION RATE: 18 BRPM | HEART RATE: 53 BPM | SYSTOLIC BLOOD PRESSURE: 147 MMHG | DIASTOLIC BLOOD PRESSURE: 75 MMHG | BODY MASS INDEX: 18.92 KG/M2 | WEIGHT: 132.2 LBS | TEMPERATURE: 98 F | OXYGEN SATURATION: 99 % | HEIGHT: 70 IN

## 2017-07-24 DIAGNOSIS — C32.9 LARYNGEAL SQUAMOUS CELL CARCINOMA (HCC): Primary | ICD-10-CM

## 2017-07-24 DIAGNOSIS — R68.2 DRY MOUTH: ICD-10-CM

## 2017-07-24 NOTE — PROGRESS NOTES
Mimi Booker 67year old male follow up of Larynx cancer, last PET scan shows remission. Mr. Kem Morales has a reminder for a \"due or due soon\" health maintenance. I have asked that he contact his primary care provider for follow-up on this health maintenance.

## 2017-07-24 NOTE — PROGRESS NOTES
Follow-up Note        Patient: Iliana Avila MRN: [id number]  SSN: [id number]    YOB: 1945  Age: 67 y.o. Sex: male        Diagnosis:     1. 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 Concurrent chemo/rads with cisplatin, completed 10/2016    Subjective:      Iliana Avila is a 67 y.o. male with a diagnosis of squamous cell laryngeal carcinoma. He was diagnosed by Dr. Alicia in July 2016. He has long history of pipe smoking. He quit all smoking more than 30 years ago. Mr. Joanne Mendez received concurrent radiation with weekly Cisplatin. CT shows resolving laryngeal mass. Laryngoscopy by Dr. Alicia on 1/19/2017 shows no tumor. Recent CT shows complete remission. He notes some taste alterations and excess oral mucus in the morning. He also says he has a dry mouth and has been using salt water in the morning. Review of Systems:     Constitutional: negative  Eyes: negative  Ears, Nose, Mouth, Throat, and Face: pressley mouth  Respiratory: negative  Cardiovascular: negative  Gastrointestinal: poor taste  Genitourinary:negative  Integument/Breast: negative  Hematologic/Lymphatic: negative  Musculoskeletal:negative  Neurological: negative      Past Medical History:   Diagnosis Date    HTN (hypertension)     Hypercholesterolemia     Hyperlipidemia     Laryngeal squamous cell carcinoma ([facility] [address] 2016     Past Surgical History:   Procedure Laterality Date    HX ORTHOPAEDIC      Back surgery x3      Family History   Problem Relation Age of Onset    Dementia Mother     Alcohol abuse Father      Social History   Substance Use Topics    Smoking status: Former Smoker    Smokeless tobacco: Never Used    Alcohol use No      Prior to Admission medications    Medication Sig Start Date End Date Taking?  Authorizing Provider   ALPRAZolam Faustino Ugarte) 1 mg tablet TAKE ONE-HALF TO ONE TABLET BY MOUTH EVERY 12 HOURS AS NEEDED 4/25/17  Yes Ramsey Coffman MD amLODIPine (NORVASC) 5 mg tablet TAKE ONE TABLET BY MOUTH ONCE DAILY FOR BLOOD PRESSURE 1/26/17  Yes Tanisha Barth MD   pravastatin (PRAVACHOL) 40 mg tablet TAKE ONE TABLET BY MOUTH ONCE DAILY FOR CHOLESTEROL 1/26/17   Tanisha Barth MD          Allergies   Allergen Reactions    Morphine Rash    Lisinopril Other (comments)     dizziness    Propranolol Other (comments)     fatigue           Objective:     Vitals:    07/24/17 0949   BP: 147/75   Pulse: (!) 53   Resp: 18   Temp: 98 °F (36.7 °C)   TempSrc: Oral   SpO2: 99%   Weight: 132 lb 3.2 oz (60 kg)   Height: 5' 10\" (1.778 m)        Physical Exam:    GENERAL: alert, cooperative  HEENT: throat dry  LYMPHATIC: Cervical, supraclavicular, and axillary nodes normal.   THROAT & NECK: oral thrush  LUNG: clear to auscultation bilaterally  HEART: regular rate and rhythm  ABDOMEN: soft, non-tender  EXTREMITIES: no cyanosis or edema  SKIN: Normal.  NEUROLOGIC: negative    CT Results (most recent):    Results from Hospital Encounter encounter on 07/06/17   CT CHEST W CONT   Narrative INDICATION:  head and neck cancer     EXAM: Chest CT  CT dose reduction was achieved through use of a standardized protocol tailored  for this examination and automatic exposure control for dose modulation. Contrast: 100 mL Isovue 300. COMPARISON: 11/18/2016. FINDINGS: There is no significant change. Small areas of scarlike appearance in  the lungs are stable with no definite new or enlarging nodule or mass. There is  no acute inflammation. Lungs are severely emphysematous and exhibit a component  of pulmonary fibrosis. Low-grade noncalcified mediastinal and bihilar nodes are stable. There is no  pleural or pericardial effusion. There is extensive coronary artery  calcification. Upper abdomen is unremarkable, with no adrenal enlargement. Impression IMPRESSION:  1. No evidence of tumor/significant adenopathy. 2. No acute finding or significant change.   3. Chronic lung disease. 4. Coronary artery calcification. Assessment:     1. 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 Completed concurrent chemo/rads 10/2016   Cisplatin weekly X 6    Laryngoscopy on 1/19/2017 shows no tumor  CT (7/6/2017) - complete remission  Asymptomatic  In remission      2. Dry mouth    > using salt water  > has mouth rinse        Plan:       > Repeat CT soft tissue neck and CT chest prior to next appointment  > Follow-up appointment in 6 months         Signed by: Cynthia Chilel MD                     July 25, 2017          CC. Melissa Spears MD ([facility])  CC. Nandini Edmondson MD  CC.  Mayuri Carlton MD

## 2017-08-29 ENCOUNTER — PATIENT OUTREACH (OUTPATIENT)
Dept: FAMILY MEDICINE CLINIC | Age: 72
End: 2017-08-29

## 2017-08-29 ENCOUNTER — HOSPITAL ENCOUNTER (OUTPATIENT)
Dept: LAB | Age: 72
Discharge: HOME | End: 2017-08-29
Payer: MEDICARE

## 2017-08-29 ENCOUNTER — OFFICE VISIT (OUTPATIENT)
Dept: FAMILY MEDICINE CLINIC | Age: 72
End: 2017-08-29

## 2017-08-29 VITALS
DIASTOLIC BLOOD PRESSURE: 78 MMHG | WEIGHT: 132.4 LBS | SYSTOLIC BLOOD PRESSURE: 140 MMHG | BODY MASS INDEX: 18.96 KG/M2 | RESPIRATION RATE: 16 BRPM | OXYGEN SATURATION: 73 % | HEART RATE: 56 BPM | HEIGHT: 70 IN | TEMPERATURE: 95.7 F

## 2017-08-29 DIAGNOSIS — E78.00 PURE HYPERCHOLESTEROLEMIA: Primary | ICD-10-CM

## 2017-08-29 DIAGNOSIS — I10 ESSENTIAL HYPERTENSION: ICD-10-CM

## 2017-08-29 DIAGNOSIS — F41.9 ANXIETY: ICD-10-CM

## 2017-08-29 DIAGNOSIS — R53.1 WEAKNESS: ICD-10-CM

## 2017-08-29 DIAGNOSIS — Z00.00 MEDICARE ANNUAL WELLNESS VISIT, SUBSEQUENT: ICD-10-CM

## 2017-08-29 DIAGNOSIS — Z12.5 SPECIAL SCREENING FOR MALIGNANT NEOPLASM OF PROSTATE: ICD-10-CM

## 2017-08-29 PROCEDURE — 80053 COMPREHEN METABOLIC PANEL: CPT

## 2017-08-29 PROCEDURE — 84443 ASSAY THYROID STIM HORMONE: CPT

## 2017-08-29 PROCEDURE — 84439 ASSAY OF FREE THYROXINE: CPT

## 2017-08-29 PROCEDURE — 36415 COLL VENOUS BLD VENIPUNCTURE: CPT

## 2017-08-29 PROCEDURE — 85025 COMPLETE CBC W/AUTO DIFF WBC: CPT

## 2017-08-29 PROCEDURE — 80061 LIPID PANEL: CPT

## 2017-08-29 RX ORDER — ALPRAZOLAM 1 MG/1
TABLET ORAL
Qty: 60 TAB | Refills: 3 | Status: SHIPPED | OUTPATIENT
Start: 2017-08-29 | End: 2018-01-17 | Stop reason: SDUPTHER

## 2017-08-29 NOTE — PROGRESS NOTES
HISTORY OF PRESENT ILLNESS  Ingrid Jensen is a 67 y.o. male. HPI Pt. Comes in for blood pressure and cholesterol check. Has finished treatment for laryngeal cancer. Still has some difficulty swallowing. Note from ENT from 2 months ago shows pt cancer free on both exam and imaging. Otherwise doing well. No chest pains, dyspnea. STill fishing and riding motorcycles. ROS    Physical Exam   Constitutional: He appears well-developed and well-nourished. HENT:   Right Ear: External ear normal.   Left Ear: External ear normal.   Mouth/Throat: Oropharynx is clear and moist.   Neck: No thyromegaly present. Cardiovascular: Normal rate, regular rhythm, normal heart sounds and intact distal pulses. Pulmonary/Chest: Effort normal and breath sounds normal. No respiratory distress. He has no wheezes. Abdominal: Soft. Bowel sounds are normal. He exhibits no distension and no mass. There is no tenderness. There is no guarding. Musculoskeletal: Normal range of motion. He exhibits no edema. Lymphadenopathy:     He has no cervical adenopathy. Nursing note and vitals reviewed. ASSESSMENT and PLAN  Orders Placed This Encounter    CBC WITH AUTOMATED DIFF    METABOLIC PANEL, COMPREHENSIVE    LIPID PANEL    TSH 3RD GENERATION    T4,FREE(DIRECT)    ALPRAZolam (XANAX) 1 mg tablet     Diagnoses and all orders for this visit:    1. Pure hypercholesterolemia  -     CBC WITH AUTOMATED DIFF  -     METABOLIC PANEL, COMPREHENSIVE  -     LIPID PANEL    2. Special screening for malignant neoplasm of prostate    3. Anxiety    4. Essential hypertension    5. Weakness  -     TSH 3RD GENERATION  -     T4,FREE(DIRECT)    6. Medicare annual wellness visit, subsequent    Other orders  -     ALPRAZolam (XANAX) 1 mg tablet; TAKE ONE-HALF TO ONE TABLET BY MOUTH EVERY 12 HOURS AS NEEDED      Follow-up Disposition:  Return in about 6 months (around 2/28/2018).

## 2017-08-29 NOTE — PROGRESS NOTES
Chief Complaint   Patient presents with    Hypertension    Cholesterol Problem    Cancer Survivorship     Vocal cord area     1. Have you been to the ER, urgent care clinic since your last visit? Hospitalized since your last visit? No    2. Have you seen or consulted any other health care providers outside of the [address] [place] [address] since your last visit? Include any pap smears or colon screening.  No         Health Maintenance Due   Topic Date Due    COLONOSCOPY  06/09/1962    GLAUCOMA SCREENING Q2Y  06/09/2009    MEDICARE YEARLY EXAM  06/09/2009

## 2017-08-29 NOTE — PROGRESS NOTES
This is an Initial Medicare Annual Wellness Exam (AWV) (Performed 12 months after IPPE or effective date of Medicare Part B enrollment, Once in a lifetime)    I have reviewed the patient's medical history in detail and updated the computerized patient record. History     Past Medical History:   Diagnosis Date    HTN (hypertension)     Hypercholesterolemia     Hyperlipidemia     Laryngeal squamous cell carcinoma ([facility] [address] 2016      Past Surgical History:   Procedure Laterality Date    HX ORTHOPAEDIC      Back surgery x3     Current Outpatient Prescriptions   Medication Sig Dispense Refill    ALPRAZolam (XANAX) 1 mg tablet TAKE ONE-HALF TO ONE TABLET BY MOUTH EVERY 12 HOURS AS NEEDED 60 Tab 0    amLODIPine (NORVASC) 5 mg tablet TAKE ONE TABLET BY MOUTH ONCE DAILY FOR BLOOD PRESSURE 90 Tab 3    pravastatin (PRAVACHOL) 40 mg tablet TAKE ONE TABLET BY MOUTH ONCE DAILY FOR CHOLESTEROL 90 Tab 3     Allergies   Allergen Reactions    Morphine Rash    Lisinopril Other (comments)     dizziness    Propranolol Other (comments)     fatigue     Family History   Problem Relation Age of Onset    Dementia Mother     Alcohol abuse Father      Social History   Substance Use Topics    Smoking status: Former Smoker    Smokeless tobacco: Never Used    Alcohol use No     Patient Active Problem List   Diagnosis Code    HTN (hypertension) I10    Hyperlipidemia E78.5    Laryngeal squamous cell carcinoma (HCC) C32.9       Depression Risk Factor Screening:     PHQ over the last two weeks 8/29/2017   Little interest or pleasure in doing things Not at all   Feeling down, depressed or hopeless Not at all   Total Score PHQ 2 0     Alcohol Risk Factor Screening: You do not drink alcohol or very rarely. Functional Ability and Level of Safety:     Hearing Loss  Hearing is good.  The patient needs further evaluation.-declined testing    Activities of Daily Living  The home contains: handrails and grab bars  Patient does total self care    Fall RiskFall Risk Assessment, last 12 mths 8/29/2017   Able to walk? Yes   Fall in past 12 months? No       Abuse Screen  Patient is not abused    Cognitive Screening   Evaluation of Cognitive Function:  Has your family/caregiver stated any concerns about your memory: no  Normal    Patient Care Team   Patient Care Team:  Candance Ngo, MD as PCP - General  Taylor Amezquita Rn Nurse Navigator  Advice/Referrals/Counseling   Education and counseling provided:  Are appropriate based on today's review and evaluation  End-of-Life planning (with patient's consent)  Prostate cancer screening tests (PSA, covered annually)  Colorectal cancer screening tests  Screening for glaucoma   Advanced Directives discussed with patient, given Your Right to Decide booklet and Advanced Directive paperwork to completed and bring back for chart. Assessment/Plan   Diagnoses and all orders for this visit:    1.  Pure hypercholesterolemia  -     CBC WITH AUTOMATED DIFF  -     METABOLIC PANEL, COMPREHENSIVE  -     LIPID PANEL      Health Maintenance Due   Topic Date Due    COLONOSCOPY  06/09/1962    GLAUCOMA SCREENING Q2Y  06/09/2009    MEDICARE YEARLY EXAM  06/09/2009

## 2017-08-29 NOTE — PATIENT INSTRUCTIONS

## 2017-08-29 NOTE — MR AVS SNAPSHOT
Visit Information Date & Time Provider Department Dept. Phone Encounter #  
 8/29/2017  9:15 AM Ana Champion MD [facility] [phone] [id number] Follow-up Instructions Return in about 6 months (around 2/28/2018). Your Appointments 1/24/2018 10:00 AM  
Any with Johanna Thomas MD  
[address] [facility] Oncology at [facility]) Appt Note: onc 6 mth f/u  
 [address] [facility] [address] [phone]  
[address] [place] [address] [address] Toby Ward Upcoming Health Maintenance Date Due COLONOSCOPY 6/9/1962 GLAUCOMA SCREENING Q2Y 6/9/2009 MEDICARE YEARLY EXAM 6/9/2009 DTaP/Tdap/Td series (2 - Td) 10/14/2025 Allergies as of 8/29/2017  Review Complete On: 8/29/2017 By: Ana Champion MD  
  
 Severity Noted Reaction Type Reactions Morphine High 03/08/2010    Rash Lisinopril  09/07/2011   Side Effect Other (comments)  
 dizziness Propranolol  03/09/2011   Side Effect Other (comments)  
 fatigue Current Immunizations  Reviewed on 7/24/2017 Name Date Tdap 10/14/2015 Not reviewed this visit You Were Diagnosed With   
  
 Codes Comments Pure hypercholesterolemia    -  Primary ICD-10-CM: E78.00 ICD-9-CM: 272.0 Special screening for malignant neoplasm of prostate     ICD-10-CM: Z12.5 ICD-9-CM: V76.44 Anxiety     ICD-10-CM: F41.9 ICD-9-CM: 300.00 Essential hypertension     ICD-10-CM: I10 
ICD-9-CM: 401.9 Weakness     ICD-10-CM: R53.1 ICD-9-CM: 780.79 Vitals BP Pulse Temp Resp Height(growth percentile) Weight(growth percentile) 140/78 (!) 56 95.7 °F (35.4 °C) (Oral) 16 5' 10\" (1.778 m) 132 lb 6.4 oz (60.1 kg) SpO2 BMI Smoking Status (!) 73% 19 kg/m2 Former Smoker Vitals History BMI and BSA Data Body Mass Index Body Surface Area  
 19 kg/m 2 1.72 m 2 Preferred Pharmacy Pharmacy Name Phone [facility] [address], [address] [place] [address] Avenue [phone] Your Updated Medication List  
  
   
This list is accurate as of: 8/29/17 10:13 AM.  Always use your most recent med list.  
  
  
  
  
 ALPRAZolam 1 mg tablet Commonly known as:  XANAX  
TAKE ONE-HALF TO ONE TABLET BY MOUTH EVERY 12 HOURS AS NEEDED  
  
 amLODIPine 5 mg tablet Commonly known as:  Meng Veliz TAKE ONE TABLET BY MOUTH ONCE DAILY FOR BLOOD PRESSURE  
  
 pravastatin 40 mg tablet Commonly known as:  PRAVACHOL  
TAKE ONE TABLET BY MOUTH ONCE DAILY FOR CHOLESTEROL Prescriptions Printed Refills ALPRAZolam (XANAX) 1 mg tablet 3 Sig: TAKE ONE-HALF TO ONE TABLET BY MOUTH EVERY 12 HOURS AS NEEDED Class: Print We Performed the Following CBC WITH AUTOMATED DIFF [57814 CPT(R)] LIPID PANEL [12778 CPT(R)] METABOLIC PANEL, COMPREHENSIVE [01110 CPT(R)]   
 T4,FREE(DIRECT) [id number] CPT(R)] TSH 3RD GENERATION [69533 CPT(R)] Follow-up Instructions Return in about 6 months (around 2/28/2018). To-Do List   
 01/18/2018 10:00 AM  
  Appointment with [facility] CT 2 at \[facility]\"" RAD CT ([phone]) CONTRAST STUDY: 1. The patient should not eat solid food four hours before the appointment but should be encouraged to drink clear liquids. 2. The patient will require IV access for contrast administration. 3. The patient should not take  Ibuprofen (Advil, Motrin, etc.) and Naproxen Sodium (Aleve, etc.)  on the day of the exam. Stopping non-steroidal anti-inflammatory agents (NSAIDs) like Ibuprofen decreases the risk of kidney damage from the x-ray contrast (dye). 4. [facility] films/images pertaining to the area of interest with you on the day of appointment.  5. Bring current lab work if available(within last 90 days [facility]) ***If scheduled at [facility], iSTAT is not available, labs will need to be done before appointment*** 6. Check in at registration at least 30 minutes before appt time unless you were instructed to do otherwise. 7. If you have to drink oral contrast please pick it up any weekday prior to your appointment, if you cannot please check in 2 hrs  before appt time. Patient Instructions Medicare Wellness Visit, Male The best way to live healthy is to have a healthy lifestyle by eating a well-balanced diet, exercising regularly, limiting alcohol and stopping smoking. Regular physical exams and screening tests are another way to keep healthy. Preventive exams provided by your health care provider can find health problems before they become diseases or illnesses. Preventive services including immunizations, screening tests, monitoring and exams can help you take care of your own health. All people over age 72 should have a pneumovax  and and a prevnar shot to prevent pneumonia. These are once in a lifetime unless you and your provider decide differently. All people over 65 should have a yearly flu shot and a tetanus vaccine every 10 years. Screening for diabetes mellitus with a blood sugar test should be done every year. Glaucoma is a disease of the eye due to increased ocular pressure that can lead to blindness and it should be done every year by an eye professional. 
 
Cardiovascular screening tests that check for elevated lipids (fatty part of blood) which can lead to heart disease and strokes should be done every 5 years. Colorectal screening that evaluates for blood or polyps in your colon should be done yearly as a stool test or every five years as a flexible sigmoidoscope or every 10 years as a colonoscopy up to age 76. Men up to age 76 may need a screening blood test for prostate cancer at certain intervals, depending on their personal and family history. This decision is between the patient and his provider. If you have been a smoker or had family history of abdominal aortic aneurysms, you and your provider may decide to schedule an ultrasound test of your aorta. Hepatitis C screening is also recommended for anyone born between [birth year] through [address]. A shingles vaccine is also recommended once in a lifetime after age 61. Your Medicare Wellness Exam is recommended annually. Here is a list of your current Health Maintenance items with a due date: 
Health Maintenance Due Topic Date Due  
 Colonoscopy  06/09/1962  Glaucoma Screening   06/09/2009 [facility] Annual Well Visit  06/09/2009 Introducing [facility] SERVICES! Dear Sandra Else: Thank you for requesting a [employer] account. Our records indicate that you already have an active [employer] account. You can access your account anytime at [email or web address] Did you know that you can access your hospital and ER discharge instructions at any time in [employer]? You can also review all of your test results from your hospital stay or ER visit. Additional Information If you have questions, please visit the Frequently Asked Questions section of the [employer] website at [email or web address] Remember, [employer] is NOT to be used for urgent needs. For medical emergencies, dial 911. Now available from your iPhone and Android! Please provide this summary of care documentation to your next provider. Your primary care clinician is listed as Lisa Fernandez. If you have any questions after today's visit, please call [phone].

## 2017-08-30 LAB
ALBUMIN SERPL-MCNC: 4.6 G/DL (ref 3.5–4.8)
ALBUMIN/GLOB SERPL: 1.4 {RATIO} (ref 1.2–2.2)
ALP SERPL-CCNC: 70 IU/L (ref 39–117)
ALT SERPL-CCNC: 13 IU/L (ref 0–44)
AST SERPL-CCNC: 25 IU/L (ref 0–40)
BASOPHILS # BLD AUTO: 0 X10E3/UL (ref 0–0.2)
BASOPHILS NFR BLD AUTO: 0 %
BILIRUB SERPL-MCNC: 0.3 MG/DL (ref 0–1.2)
BUN SERPL-MCNC: 11 MG/DL (ref 8–27)
BUN/CREAT SERPL: 14 (ref 10–24)
CALCIUM SERPL-MCNC: 9.8 MG/DL (ref 8.6–10.2)
CHLORIDE SERPL-SCNC: 95 MMOL/L (ref 96–106)
CHOLEST SERPL-MCNC: 156 MG/DL (ref 100–199)
CO2 SERPL-SCNC: 25 MMOL/L (ref 18–29)
CREAT SERPL-MCNC: 0.78 MG/DL (ref 0.76–1.27)
EOSINOPHIL # BLD AUTO: 0.2 X10E3/UL (ref 0–0.4)
EOSINOPHIL NFR BLD AUTO: 2 %
ERYTHROCYTE [DISTWIDTH] IN BLOOD BY AUTOMATED COUNT: 14 % (ref 12.3–15.4)
GLOBULIN SER CALC-MCNC: 3.2 G/DL (ref 1.5–4.5)
GLUCOSE SERPL-MCNC: 103 MG/DL (ref 65–99)
HCT VFR BLD AUTO: 41.4 % (ref 37.5–51)
HDLC SERPL-MCNC: 39 MG/DL
HGB BLD-MCNC: 14.3 G/DL (ref 12.6–17.7)
IMM GRANULOCYTES # BLD: 0 X10E3/UL (ref 0–0.1)
IMM GRANULOCYTES NFR BLD: 0 %
INTERPRETATION, 910389: NORMAL
LDLC SERPL CALC-MCNC: 93 MG/DL (ref 0–99)
LYMPHOCYTES # BLD AUTO: 0.5 X10E3/UL (ref 0.7–3.1)
LYMPHOCYTES NFR BLD AUTO: 6 %
MCH RBC QN AUTO: 30.5 PG (ref 26.6–33)
MCHC RBC AUTO-ENTMCNC: 34.5 G/DL (ref 31.5–35.7)
MCV RBC AUTO: 88 FL (ref 79–97)
MONOCYTES # BLD AUTO: 0.5 X10E3/UL (ref 0.1–0.9)
MONOCYTES NFR BLD AUTO: 6 %
NEUTROPHILS # BLD AUTO: 7.3 X10E3/UL (ref 1.4–7)
NEUTROPHILS NFR BLD AUTO: 86 %
PLATELET # BLD AUTO: 262 X10E3/UL (ref 150–379)
POTASSIUM SERPL-SCNC: 4.2 MMOL/L (ref 3.5–5.2)
PROT SERPL-MCNC: 7.8 G/DL (ref 6–8.5)
RBC # BLD AUTO: 4.69 X10E6/UL (ref 4.14–5.8)
SODIUM SERPL-SCNC: 136 MMOL/L (ref 134–144)
T4 FREE SERPL-MCNC: 0.88 NG/DL (ref 0.82–1.77)
TRIGL SERPL-MCNC: 120 MG/DL (ref 0–149)
TSH SERPL DL<=0.005 MIU/L-ACNC: 9.03 UIU/ML (ref 0.45–4.5)
VLDLC SERPL CALC-MCNC: 24 MG/DL (ref 5–40)
WBC # BLD AUTO: 8.5 X10E3/UL (ref 3.4–10.8)

## 2018-01-18 ENCOUNTER — HOSPITAL ENCOUNTER (OUTPATIENT)
Dept: CT IMAGING | Age: 73
Discharge: HOME | End: 2018-01-18
Attending: INTERNAL MEDICINE
Payer: MEDICARE

## 2018-01-18 ENCOUNTER — TELEPHONE (OUTPATIENT)
Dept: FAMILY MEDICINE CLINIC | Age: 73
End: 2018-01-18

## 2018-01-18 DIAGNOSIS — C32.9 LARYNGEAL SQUAMOUS CELL CARCINOMA (HCC): ICD-10-CM

## 2018-01-18 DIAGNOSIS — F41.9 ANXIETY: ICD-10-CM

## 2018-01-18 PROCEDURE — 74011250636 HC RX REV CODE- 250/636: Performed by: INTERNAL MEDICINE

## 2018-01-18 PROCEDURE — 74011636320 HC RX REV CODE- 636/320: Performed by: INTERNAL MEDICINE

## 2018-01-18 PROCEDURE — 71260 CT THORAX DX C+: CPT

## 2018-01-18 PROCEDURE — 82565 ASSAY OF CREATININE: CPT

## 2018-01-18 PROCEDURE — 70491 CT SOFT TISSUE NECK W/DYE: CPT

## 2018-01-18 RX ORDER — SODIUM CHLORIDE 0.9 % (FLUSH) 0.9 %
10 SYRINGE (ML) INJECTION
Status: COMPLETED | OUTPATIENT
Start: 2018-01-18 | End: 2018-01-18

## 2018-01-18 RX ORDER — SODIUM CHLORIDE 9 MG/ML
50 INJECTION, SOLUTION INTRAVENOUS
Status: COMPLETED | OUTPATIENT
Start: 2018-01-18 | End: 2018-01-18

## 2018-01-18 RX ADMIN — IOPAMIDOL 100 ML: 755 INJECTION, SOLUTION INTRAVENOUS at 09:30

## 2018-01-18 RX ADMIN — SODIUM CHLORIDE 50 ML/HR: 900 INJECTION, SOLUTION INTRAVENOUS at 09:30

## 2018-01-18 RX ADMIN — Medication 10 ML: at 09:30

## 2018-01-19 LAB — CREAT BLD-MCNC: 0.8 MG/DL (ref 0.6–1.3)

## 2018-01-24 ENCOUNTER — OFFICE VISIT (OUTPATIENT)
Dept: ONCOLOGY | Age: 73
End: 2018-01-24

## 2018-01-24 VITALS
WEIGHT: 140.8 LBS | DIASTOLIC BLOOD PRESSURE: 78 MMHG | HEART RATE: 59 BPM | HEIGHT: 70 IN | SYSTOLIC BLOOD PRESSURE: 149 MMHG | BODY MASS INDEX: 20.16 KG/M2 | OXYGEN SATURATION: 98 % | TEMPERATURE: 97.4 F

## 2018-01-24 DIAGNOSIS — C32.9 LARYNGEAL SQUAMOUS CELL CARCINOMA (HCC): Primary | ICD-10-CM

## 2018-01-24 NOTE — MR AVS SNAPSHOT
[address] [facility] [address] [facility] 
[phone] Patient: Danielle Almaraz MRN: [id number] RIJ:[id number] Visit Information Date & Time Provider Department Dept. Phone Encounter #  
 1/24/2018 10:00 AM Lamar Vega MD [address] Oncology at [address] [id number] Your Appointments 2/26/2018  8:45 AM  
ROUTINE CARE with Ayesha Dawson MD  
[facility] [address]) Appt Note: routine follow up  
 [address] [place] [address] [phone] [address] [facility] [address] Upcoming Health Maintenance Date Due  
 GLAUCOMA SCREENING Q2Y 6/18/2017 MEDICARE YEARLY EXAM 8/30/2018 DTaP/Tdap/Td series (2 - Td) 10/14/2025 COLONOSCOPY 8/29/2027 Allergies as of 1/24/2018  Review Complete On: 1/24/2018 By: Lizz Davis RN Severity Noted Reaction Type Reactions Morphine High 03/08/2010    Rash Lisinopril  09/07/2011   Side Effect Other (comments)  
 dizziness Propranolol  03/09/2011   Side Effect Other (comments)  
 fatigue Current Immunizations  Reviewed on 7/24/2017 Name Date Tdap 10/14/2015 Not reviewed this visit You Were Diagnosed With   
  
 Codes Comments Laryngeal squamous cell carcinoma (HCC)    -  Primary ICD-10-CM: C32.9 ICD-9-CM: 161.9 Vitals BP Pulse Temp Height(growth percentile) Weight(growth percentile) SpO2  
 149/78 (BP 1 Location: Left arm, BP Patient Position: Sitting) (!) 59 97.4 °F (36.3 °C) (Oral) 5' 10\" (1.778 m) 140 lb 12.8 oz (63.9 kg) 98% BMI Smoking Status 20.2 kg/m2 Former Smoker BMI and BSA Data Body Mass Index Body Surface Area  
 20.2 kg/m 2 1.78 m 2 Preferred Pharmacy Pharmacy Name Phone [facility] [address], [address] [place] [address] Avenue [phone] Your Updated Medication List  
  
   
This list is accurate as of: 1/24/18 10:38 AM.  Always use your most recent med list.  
  
  
  
  
 ALPRAZolam 1 mg tablet Commonly known as:  XANAX  
TAKE ONE-HALF TO ONE TABLET BY MOUTH EVERY 12 HOURS AS NEEDED  
  
 amLODIPine 5 mg tablet Commonly known as:  Acosta Lords TAKE ONE TABLET BY MOUTH ONCE DAILY FOR BLOOD PRESSURE  
  
 pravastatin 40 mg tablet Commonly known as:  PRAVACHOL  
TAKE ONE TABLET BY MOUTH ONCE DAILY FOR CHOLESTEROL To-Do List   
 07/24/2018 Imaging:  CT CHEST W CONT   
  
 07/24/2018 Imaging:  CT NECK SOFT TISSUE W CONT Patient Instructions Get Flonase and use daily in each nostril Introducing [facility] SERVICES! Dear Shahrzad Figueroa: Thank you for requesting a [employer] account. Our records indicate that you already have an active [employer] account. You can access your account anytime at [email or web address] Did you know that you can access your hospital and ER discharge instructions at any time in [employer]? You can also review all of your test results from your hospital stay or ER visit. Additional Information If you have questions, please visit the Frequently Asked Questions section of the [employer] website at [email or web address] Remember, [employer] is NOT to be used for urgent needs. For medical emergencies, dial 911. Now available from your iPhone and Android! Please provide this summary of care documentation to your next provider. Your primary care clinician is listed as Anjali Bustamante. If you have any questions after today's visit, please call [phone].

## 2018-01-24 NOTE — PROGRESS NOTES
Follow-up Note        Patient: Carmita De Dios MRN: [id number]  SSN: [id number]    YOB: 1945  Age: 67 y.o. Sex: male        Diagnosis:     1. 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 Concurrent chemo/rads with cisplatin, completed 10/2016    Subjective:      Carmita De Dios is a 67 y.o. male with a diagnosis of squamous cell laryngeal carcinoma. He was diagnosed by Dr. Alicia in July 2016. He has long history of pipe smoking. He quit all smoking more than 30 years ago. Mr. Zachary Coleman received concurrent radiation with weekly Cisplatin. CT shows resolving laryngeal mass. Laryngoscopy by Dr. Alicia on 1/19/2017 shows no tumor. Recent CT shows complete remission. He notes some taste alterations and excess oral mucus in the morning. He also says he has a dry mouth and uses biotin. His only complaint is intermittent nasal congestion. Review of Systems:     Constitutional: negative  Eyes: negative  Ears, Nose, Mouth, Throat, and Face: dry mouth, nasal congestion  Respiratory: negative  Cardiovascular: negative  Gastrointestinal: poor taste  Genitourinary:negative  Integument/Breast: negative  Hematologic/Lymphatic: negative  Musculoskeletal:negative  Neurological: negative      Past Medical History:   Diagnosis Date    HTN (hypertension)     Hypercholesterolemia     Hyperlipidemia     Laryngeal squamous cell carcinoma ([facility] [address] 2016    Subclinical hypothyroidism      Past Surgical History:   Procedure Laterality Date    HX ORTHOPAEDIC      Back surgery x3      Family History   Problem Relation Age of Onset    Dementia Mother     Alcohol abuse Father      Social History   Substance Use Topics    Smoking status: Former Smoker    Smokeless tobacco: Never Used    Alcohol use No      Prior to Admission medications    Medication Sig Start Date End Date Taking?  Authorizing Provider   amLODIPine (NORVASC) 5 mg tablet TAKE ONE TABLET BY MOUTH ONCE DAILY FOR BLOOD PRESSURE 1/17/18  Yes Polo Lombardo MD   pravastatin (PRAVACHOL) 40 mg tablet TAKE ONE TABLET BY MOUTH ONCE DAILY FOR CHOLESTEROL 1/26/17  Yes Polo Lombardo MD   ALPRAZolam Derik Bolk) 1 mg tablet TAKE ONE-HALF TO ONE TABLET BY MOUTH EVERY 12 HOURS AS NEEDED 1/17/18   Polo Lombardo MD          Allergies   Allergen Reactions    Morphine Rash    Lisinopril Other (comments)     dizziness    Propranolol Other (comments)     fatigue           Objective:     Vitals:    01/24/18 1018   BP: 149/78   Pulse: (!) 59   Temp: 97.4 °F (36.3 °C)   TempSrc: Oral   SpO2: 98%   Weight: 140 lb 12.8 oz (63.9 kg)   Height: 5' 10\" (1.778 m)        Physical Exam:    GENERAL: alert, cooperative  HEENT: throat dry  LYMPHATIC: Cervical, supraclavicular, and axillary nodes normal.   THROAT & NECK: oral thrush  LUNG: clear to auscultation bilaterally  HEART: regular rate and rhythm  ABDOMEN: soft, non-tender  EXTREMITIES: no cyanosis or edema  SKIN: Normal.  NEUROLOGIC: negative    CT Results (most recent):    Results from Hospital Encounter encounter on 01/18/18   CT CHEST W CONT   Narrative EXAM:  CT NECK SOFT TISSUE W CONT, CT CHEST W CONT  INDICATION:   larynx cancer, status post radiation and left hemilaryngectomy. COMPARISON: None. TECHNIQUE: Axial neck and chest CT CT was performed  following the IV injection  of 100 cc Isovue-370. Coronal  and sagittal and sagittal reformatted images were  generated. CT dose reduction was achieved through the use of a standardized  protocol tailored for this examination and automatic exposure control for dose  modulation. CT NECK:    ORBITS, PARANASAL SINUSES, AND SKULL BASE: Orbits not included. Visualized  paranasal sinuses are clear. Visualized portion of the brain normal. No skull  base abnormalities. NASOPHARYNX:  Normal.  SUPRAHYOID NECK:  Decreased lymphoid tissue in the nasopharynx and oropharynx,  consistent with prior radiation. No masses demonstrated.  No abnormalities  demonstrated in the oral cavity. INFRAHYOID NECK:  Diffuse thickening of the larynx, consistent with post  radiation change. No masses demonstrated. THYROID:  Normal.  SALIVARY GLANDS: Atrophic submandibular glands, more so on the right than on the  left, consistent with postradiation change. No abnormalities in the parotid  glands. LYMPH NODES: No lymph node enlargement or heterogeneity. VASCULAR STRUCTURES:  Patent. Bilateral carotid bifurcation calcification, right  more so than left, without severe stenosis. OTHER FINDINGS:  None. CT CHEST:    CHEST WALL/THORACIC INLET: Within normal limits. MEDIASTINUM/JEN: No lymph node enlargement or heterogeneity. Unchanged normal  size lymph nodes. [place] HEART/VESSELS: Coronary artery calcification. Normal heart size. LUNGS/PLEURA:   1. Unchanged mild right pleural thickening. No pleural fluid. 2. Severe centrilobular emphysema. 3. Unchanged mild bilateral apical pleural and parenchymal scarring. Unchanged anterior right upper lobe subsegmental scarring and volume loss. Unchanged thickening along the superior aspect of the right major fissure. 4. Unchanged 5 x 9 mm right lower lobe subpleural nodule (4-48), stable since at  least the PET/CT of 11/20/2016 and probably benign. Unchanged small right lower  lobe nodule (4-62), left lower lobe subpleural nodule (4-64), left upper lobe  nodule (4-26). All unchanged since August, 2016. No suspicious nodules. Few  calcified granulomata. 5. Unchanged bibasilar chronic interstitial lung disease. UPPER ABDOMEN: No abnormalities demonstrated. Impression IMPRESSION:  1. 1. Unchanged appearance of the neck and chest since 7/6/2017. No acute  findings or recurrent disease demonstrated. 2. Severe emphysema. Unchanged small parenchymal nodules and areas of  parenchymal scarring. 3. Coronary artery calcification. Assessment:     1.  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 Completed concurrent chemo/rads 10/2016   Cisplatin weekly X 6    Laryngoscopy on 1/19/2017 shows no tumor  CT (1/18/2018) - complete remission  Asymptomatic  In remission      2. Dry mouth    > using salt water  > has mouth rinse      3. Nasal congestion    > recommend flonase      Plan:       > Follow up with Dr. Kwasi Phipps in March  > Repeat scans prior to next visit  > Flonase for nasal congestion  > Follow-up appointment in 6 months           Signed by: Fay Wolff MD                     January 24, 2018          CC. Merlin Deed, MD ([facility])  CC. Babs Lagos MD  CC.  Lilo Oneal MD

## 2018-02-26 ENCOUNTER — OFFICE VISIT (OUTPATIENT)
Dept: FAMILY MEDICINE CLINIC | Age: 73
End: 2018-02-26

## 2018-02-26 ENCOUNTER — HOSPITAL ENCOUNTER (OUTPATIENT)
Dept: LAB | Age: 73
Discharge: HOME | End: 2018-02-26
Payer: MEDICARE

## 2018-02-26 VITALS
HEART RATE: 71 BPM | RESPIRATION RATE: 14 BRPM | BODY MASS INDEX: 19.56 KG/M2 | HEIGHT: 70 IN | TEMPERATURE: 95 F | SYSTOLIC BLOOD PRESSURE: 135 MMHG | OXYGEN SATURATION: 97 % | WEIGHT: 136.6 LBS | DIASTOLIC BLOOD PRESSURE: 74 MMHG

## 2018-02-26 DIAGNOSIS — I10 ESSENTIAL HYPERTENSION: ICD-10-CM

## 2018-02-26 DIAGNOSIS — R53.1 WEAKNESS: ICD-10-CM

## 2018-02-26 DIAGNOSIS — E78.00 PURE HYPERCHOLESTEROLEMIA: Primary | ICD-10-CM

## 2018-02-26 PROCEDURE — 84443 ASSAY THYROID STIM HORMONE: CPT

## 2018-02-26 PROCEDURE — 36415 COLL VENOUS BLD VENIPUNCTURE: CPT

## 2018-02-26 PROCEDURE — 80061 LIPID PANEL: CPT

## 2018-02-26 PROCEDURE — 80053 COMPREHEN METABOLIC PANEL: CPT

## 2018-02-26 PROCEDURE — 85025 COMPLETE CBC W/AUTO DIFF WBC: CPT

## 2018-02-26 PROCEDURE — 84439 ASSAY OF FREE THYROXINE: CPT

## 2018-02-26 RX ORDER — PRAVASTATIN SODIUM 40 MG/1
TABLET ORAL
Qty: 90 TAB | Refills: 3 | Status: SHIPPED | OUTPATIENT
Start: 2018-02-26 | End: 2019-01-01 | Stop reason: SDUPTHER

## 2018-02-26 NOTE — PROGRESS NOTES
HISTORY OF PRESENT ILLNESS  Weston Amanda is a 67 y.o. male. HPI Pt. Comes in for blood pressure and cholesterol check. Has had RT and chemo for laryngeal cancer in 2016, doing well since then. Has some problems with allergies, has used flonase - minimal relief. claritin D- some relief. ROS    Physical Exam   Constitutional: He appears well-developed and well-nourished. HENT:   Right Ear: External ear normal.   Left Ear: External ear normal.   Mouth/Throat: Oropharynx is clear and moist.   Neck: No thyromegaly present. Cardiovascular: Normal rate, regular rhythm, normal heart sounds and intact distal pulses. Pulmonary/Chest: Effort normal and breath sounds normal. No respiratory distress. He has no wheezes. Abdominal: Soft. Bowel sounds are normal. He exhibits no distension and no mass. There is no tenderness. There is no guarding. Musculoskeletal: Normal range of motion. He exhibits no edema. Lymphadenopathy:     He has no cervical adenopathy. Nursing note and vitals reviewed. ASSESSMENT and PLAN  Orders Placed This Encounter    CBC WITH AUTOMATED DIFF    METABOLIC PANEL, COMPREHENSIVE    LIPID PANEL    T4,FREE(DIRECT)    TSH 3RD GENERATION    pravastatin (PRAVACHOL) 40 mg tablet     Diagnoses and all orders for this visit:    1. Pure hypercholesterolemia  -     CBC WITH AUTOMATED DIFF  -     METABOLIC PANEL, COMPREHENSIVE  -     LIPID PANEL    2. Essential hypertension    3. Weakness  -     T4,FREE(DIRECT)  -     TSH 3RD GENERATION    Other orders  -     pravastatin (PRAVACHOL) 40 mg tablet; TAKE ONE TABLET BY MOUTH ONCE DAILY FOR CHOLESTEROL      Follow-up Disposition:  Return in about 6 months (around 8/26/2018).

## 2018-02-26 NOTE — MR AVS SNAPSHOT
[address] [facility] [address] 
 
 
 [address] [facility] [place] [address] [address] 
[phone] Patient: Danielle Almaraz MRN: [id number] EZZ:7[birth year]8/[birth year]15 Visit Information Date & Time Provider Department Dept. Phone Encounter #  
 2/26/2018  8:45 AM Ayesha Dawson MD [facility] [phone] [id number] Your Appointments 7/27/2018  9:30 AM  
ESTABLISHED PATIENT with Lamar Vega MD  
[address] [facility] Oncology at [facility]) Appt Note: onc 6 mth f/u  
 [address] [address]  
[address] [facility] [address] Toby Ward Upcoming Health Maintenance Date Due  
 GLAUCOMA SCREENING Q2Y 6/18/2017 MEDICARE YEARLY EXAM 8/30/2018 DTaP/Tdap/Td series (2 - Td) 10/14/2025 COLONOSCOPY 8/29/2027 Allergies as of 2/26/2018  Review Complete On: 2/26/2018 By: Mare Wright LPN Severity Noted Reaction Type Reactions Morphine High 03/08/2010    Rash Lisinopril  09/07/2011   Side Effect Other (comments)  
 dizziness Propranolol  03/09/2011   Side Effect Other (comments)  
 fatigue Current Immunizations  Reviewed on 7/24/2017 Name Date Tdap 10/14/2015 Not reviewed this visit You Were Diagnosed With   
  
 Codes Comments Pure hypercholesterolemia    -  Primary ICD-10-CM: E78.00 ICD-9-CM: 272.0 Essential hypertension     ICD-10-CM: I10 
ICD-9-CM: 401.9 Weakness     ICD-10-CM: R53.1 ICD-9-CM: 780.79 Vitals BP Pulse Temp Resp Height(growth percentile) Weight(growth percentile) 135/74 71 95 °F (35 °C) (Oral) 14 5' 10\" (1.778 m) 136 lb 9.6 oz (62 kg) SpO2 BMI Smoking Status 97% 19.6 kg/m2 Former Smoker Vitals History BMI and BSA Data Body Mass Index Body Surface Area 19.6 kg/m 2 1.75 m 2 Preferred Pharmacy Pharmacy Name Phone [facility] [address] [phone] Your Updated Medication List  
  
   
This list is accurate as of 2/26/18  9:30 AM.  Always use your most recent med list.  
  
  
  
  
 ALPRAZolam 1 mg tablet Commonly known as:  XANAX  
TAKE ONE-HALF TO ONE TABLET BY MOUTH EVERY 12 HOURS AS NEEDED  
  
 amLODIPine 5 mg tablet Commonly known as:  Carlita Hensen TAKE ONE TABLET BY MOUTH ONCE DAILY FOR BLOOD PRESSURE  
  
 pravastatin 40 mg tablet Commonly known as:  PRAVACHOL  
TAKE ONE TABLET BY MOUTH ONCE DAILY FOR CHOLESTEROL Prescriptions Sent to Pharmacy Refills  
 pravastatin (PRAVACHOL) 40 mg tablet 3 Sig: TAKE ONE TABLET BY MOUTH ONCE DAILY FOR CHOLESTEROL Class: Normal  
 Pharmacy: [facility] [address] AGUILA KABA [address], [facility] [address] Ph #: [phone] We Performed the Following CBC WITH AUTOMATED DIFF [06325 CPT(R)] LIPID PANEL [32585 CPT(R)] METABOLIC PANEL, COMPREHENSIVE [09133 CPT(R)]   
 T4,FREE(DIRECT) [id number] CPT(R)] TSH 3RD GENERATION [75527 CPT(R)] To-Do List   
 07/19/2018 9:30 AM  
  Appointment with [address] 1 at \[facility]\"" RAD CT ([phone]) CONTRAST STUDY: 1. The patient should not eat solid food four hours before the appointment but should be encouraged to drink clear liquids. 2. The patient will require IV access for contrast administration. 3. The patient should not take  Ibuprofen (Advil, Motrin, etc.) and Naproxen Sodium (Aleve, etc.)  on the day of the exam. Stopping non-steroidal anti-inflammatory agents (NSAIDs) like Ibuprofen decreases the risk of kidney damage from the x-ray contrast (dye). 4. Bring any non [facility] films/images pertaining to the area of interest with you on the day of appointment.  5. Bring current lab work if available(within last 90 days [facility]) ***If scheduled at [facility] is not available, labs will need to be done before appointment*** 6. Check in at registration at least 30 minutes before appt time unless you were instructed to do otherwise. 7. If you have to drink oral contrast please pick it up any weekday prior to your appointment, if you cannot please check in 2 hrs  before appt time. 07/19/2018 10:00 AM  
  Appointment with [facility] 1 at [facility] RAD CT ([phone]) CONTRAST STUDY: 1. The patient should not eat solid food four hours before the appointment but should be encouraged to drink clear liquids. 2. The patient will require IV access for contrast administration. 3. The patient should not take  Ibuprofen (Advil, Motrin, etc.) and Naproxen Sodium (Aleve, etc.)  on the day of the exam. Stopping non-steroidal anti-inflammatory agents (NSAIDs) like Ibuprofen decreases the risk of kidney damage from the x-ray contrast (dye). 4. Bring any non [facility] films/images pertaining to the area of interest with you on the day of appointment. 5. Bring current lab work if available(within last 90 days [facility]) ***If scheduled at [facility] iSTAT is not available, labs will need to be done before appointment*** 6. Check in at registration at least 30 minutes before appt time unless you were instructed to do otherwise. 7. If you have to drink oral contrast please pick it up any weekday prior to your appointment, if you cannot please check in 2 hrs  before appt time. Introducing [facility] & HEALTH SERVICES! Dear Alejandra Fan: Thank you for requesting a [employer] account. Our records indicate that you already have an active [employer]inelOne account. You can access your account anytime at [email or web address]. [email or web address] Did you know that you can access your hospital and ER discharge instructions at any time in SentinelOne? You can also review all of your test results from your hospital stay or ER visit. Additional Information If you have questions, please visit the Frequently Asked Questions section of the [employer] website at [email or web address] Remember, [employer] is NOT to be used for urgent needs. For medical emergencies, dial 911. Now available from your iPhone and Android! Please provide this summary of care documentation to your next provider. Your primary care clinician is listed as Kathren Mohs. If you have any questions after today's visit, please call [phone].

## 2018-02-26 NOTE — PROGRESS NOTES
Chief Complaint   Patient presents with    Hypertension    Cholesterol Problem    Thyroid Problem     1. Have you been to the ER, urgent care clinic since your last visit? Hospitalized since your last visit? No    2. Have you seen or consulted any other health care providers outside of the [address] [place] [address] since your last visit? Include any pap smears or colon screening.  No     Health Maintenance Due   Topic Date Due    GLAUCOMA SCREENING Q2Y  06/18/2017

## 2018-02-27 LAB
ALBUMIN SERPL-MCNC: 4.8 G/DL (ref 3.5–4.8)
ALBUMIN/GLOB SERPL: 1.5 {RATIO} (ref 1.2–2.2)
ALP SERPL-CCNC: 74 IU/L (ref 39–117)
ALT SERPL-CCNC: 14 IU/L (ref 0–44)
AST SERPL-CCNC: 24 IU/L (ref 0–40)
BASOPHILS # BLD AUTO: 0 X10E3/UL (ref 0–0.2)
BASOPHILS NFR BLD AUTO: 0 %
BILIRUB SERPL-MCNC: 0.3 MG/DL (ref 0–1.2)
BUN SERPL-MCNC: 11 MG/DL (ref 8–27)
BUN/CREAT SERPL: 14 (ref 10–24)
CALCIUM SERPL-MCNC: 9.7 MG/DL (ref 8.6–10.2)
CHLORIDE SERPL-SCNC: 95 MMOL/L (ref 96–106)
CHOLEST SERPL-MCNC: 146 MG/DL (ref 100–199)
CO2 SERPL-SCNC: 25 MMOL/L (ref 18–29)
CREAT SERPL-MCNC: 0.79 MG/DL (ref 0.76–1.27)
EOSINOPHIL # BLD AUTO: 0.4 X10E3/UL (ref 0–0.4)
EOSINOPHIL NFR BLD AUTO: 6 %
ERYTHROCYTE [DISTWIDTH] IN BLOOD BY AUTOMATED COUNT: 13.8 % (ref 12.3–15.4)
GFR SERPLBLD CREATININE-BSD FMLA CKD-EPI: 103 ML/MIN/1.73
GFR SERPLBLD CREATININE-BSD FMLA CKD-EPI: 89 ML/MIN/1.73
GLOBULIN SER CALC-MCNC: 3.1 G/DL (ref 1.5–4.5)
GLUCOSE SERPL-MCNC: 89 MG/DL (ref 65–99)
HCT VFR BLD AUTO: 42.1 % (ref 37.5–51)
HDLC SERPL-MCNC: 39 MG/DL
HGB BLD-MCNC: 14.3 G/DL (ref 13–17.7)
IMM GRANULOCYTES # BLD: 0 X10E3/UL (ref 0–0.1)
IMM GRANULOCYTES NFR BLD: 0 %
INTERPRETATION, 910389: NORMAL
LDLC SERPL CALC-MCNC: 90 MG/DL (ref 0–99)
LYMPHOCYTES # BLD AUTO: 0.4 X10E3/UL (ref 0.7–3.1)
LYMPHOCYTES NFR BLD AUTO: 6 %
MCH RBC QN AUTO: 30.9 PG (ref 26.6–33)
MCHC RBC AUTO-ENTMCNC: 34 G/DL (ref 31.5–35.7)
MCV RBC AUTO: 91 FL (ref 79–97)
MONOCYTES # BLD AUTO: 0.6 X10E3/UL (ref 0.1–0.9)
MONOCYTES NFR BLD AUTO: 9 %
NEUTROPHILS # BLD AUTO: 5.9 X10E3/UL (ref 1.4–7)
NEUTROPHILS NFR BLD AUTO: 79 %
PLATELET # BLD AUTO: 274 X10E3/UL (ref 150–379)
POTASSIUM SERPL-SCNC: 3.9 MMOL/L (ref 3.5–5.2)
PROT SERPL-MCNC: 7.9 G/DL (ref 6–8.5)
RBC # BLD AUTO: 4.63 X10E6/UL (ref 4.14–5.8)
SODIUM SERPL-SCNC: 136 MMOL/L (ref 134–144)
T4 FREE SERPL-MCNC: 0.73 NG/DL (ref 0.82–1.77)
TRIGL SERPL-MCNC: 86 MG/DL (ref 0–149)
TSH SERPL DL<=0.005 MIU/L-ACNC: 16.35 UIU/ML (ref 0.45–4.5)
VLDLC SERPL CALC-MCNC: 17 MG/DL (ref 5–40)
WBC # BLD AUTO: 7.4 X10E3/UL (ref 3.4–10.8)

## 2018-03-05 ENCOUNTER — TELEPHONE (OUTPATIENT)
Dept: FAMILY MEDICINE CLINIC | Age: 73
End: 2018-03-05

## 2018-03-05 PROBLEM — E03.9 ACQUIRED HYPOTHYROIDISM: Status: ACTIVE | Noted: 2018-03-05

## 2018-03-05 RX ORDER — LEVOTHYROXINE SODIUM 50 UG/1
50 TABLET ORAL
Qty: 30 TAB | Refills: 12 | Status: SHIPPED | OUTPATIENT
Start: 2018-03-05 | End: 2019-01-01 | Stop reason: SDUPTHER

## 2018-05-09 ENCOUNTER — HOSPITAL ENCOUNTER (OUTPATIENT)
Dept: LAB | Age: 73
Discharge: HOME | End: 2018-05-09
Payer: MEDICARE

## 2018-05-09 ENCOUNTER — OFFICE VISIT (OUTPATIENT)
Dept: FAMILY MEDICINE CLINIC | Age: 73
End: 2018-05-09

## 2018-05-09 VITALS
OXYGEN SATURATION: 97 % | WEIGHT: 136.4 LBS | TEMPERATURE: 96.2 F | SYSTOLIC BLOOD PRESSURE: 116 MMHG | HEART RATE: 61 BPM | BODY MASS INDEX: 19.53 KG/M2 | RESPIRATION RATE: 14 BRPM | HEIGHT: 70 IN | DIASTOLIC BLOOD PRESSURE: 62 MMHG

## 2018-05-09 DIAGNOSIS — E03.9 ACQUIRED HYPOTHYROIDISM: Primary | ICD-10-CM

## 2018-05-09 DIAGNOSIS — F41.9 ANXIETY: ICD-10-CM

## 2018-05-09 PROCEDURE — 84439 ASSAY OF FREE THYROXINE: CPT

## 2018-05-09 PROCEDURE — 36415 COLL VENOUS BLD VENIPUNCTURE: CPT

## 2018-05-09 PROCEDURE — 84443 ASSAY THYROID STIM HORMONE: CPT

## 2018-05-09 RX ORDER — ALPRAZOLAM 1 MG/1
TABLET ORAL
Qty: 60 TAB | Refills: 3 | Status: SHIPPED | OUTPATIENT
Start: 2018-05-09 | End: 2019-01-01 | Stop reason: SDUPTHER

## 2018-05-09 NOTE — PROGRESS NOTES
HISTORY OF PRESENT ILLNESS  Lakisha Molina is a 67 y.o. male. HPI IN for thyroid check. HAd RT for an ENT cancer, has developed hypothyroidism. Has gained a few lbs. Some heat intolerance at times. Keeping busy doing relic hunting. Some claudication in calves. No diarrhea or constipation. ROS    Physical Exam   Constitutional: He appears well-developed and well-nourished. HENT:   Right Ear: External ear normal.   Left Ear: External ear normal.   Mouth/Throat: Oropharynx is clear and moist.   Neck: No thyromegaly present. Cardiovascular: Normal rate, regular rhythm, normal heart sounds and intact distal pulses. Pulmonary/Chest: Effort normal and breath sounds normal. No respiratory distress. He has no wheezes. Abdominal: Soft. Bowel sounds are normal. He exhibits no distension and no mass. There is no tenderness. There is no guarding. Musculoskeletal: Normal range of motion. He exhibits no edema. Lymphadenopathy:     He has no cervical adenopathy. Nursing note and vitals reviewed. ASSESSMENT and PLAN  Orders Placed This Encounter    TSH 3RD GENERATION    T4,FREE(DIRECT)    ALPRAZolam (XANAX) 1 mg tablet     Diagnoses and all orders for this visit:    1. Acquired hypothyroidism  -     TSH 3RD GENERATION  -     T4,FREE(DIRECT)    2. Anxiety  -     ALPRAZolam (XANAX) 1 mg tablet; TAKE ONE-HALF TO ONE TABLET BY MOUTH EVERY 12 HOURS AS NEEDED      Follow-up Disposition:  Return in about 3 months (around 8/9/2018).

## 2018-05-09 NOTE — PROGRESS NOTES
Chief Complaint   Patient presents with    Thyroid Problem    Cholesterol Problem     1. Have you been to the ER, urgent care clinic since your last visit? Hospitalized since your last visit? No    2. Have you seen or consulted any other health care providers outside of the [address] [place] [address] since your last visit? Include any pap smears or colon screening.  No     Health Maintenance Due   Topic Date Due    GLAUCOMA SCREENING Q2Y  06/18/2017

## 2018-05-09 NOTE — MR AVS SNAPSHOT
Kathie Novoa 
 
 
 [address] [facility] [address] [address] [phone] Patient: Lakisha Molina MRN: [id number] VSJ:[birth year] Visit Information Date & Time Provider Department Dept. Phone Encounter #  
 5/9/2018 10:00 AM Fidencio Ponce MD [facility] [phone] [id number] Your Appointments 7/27/2018  9:30 AM  
ESTABLISHED PATIENT with Israel Coleman MD  
[address] [facility] Oncology at [facility]) Appt Note: onc 6 mth f/u  
 [address] [address]  
[address] [address]  
  
    
 8/27/2018  8:45 AM  
ROUTINE CARE with Fidencio Ponce MD  
[facility] [address]) Appt Note: routine follow up  
 [address] [facility] [address] [address]  
[phone] [address] [facility] [address] Upcoming Health Maintenance Date Due  
 GLAUCOMA SCREENING Q2Y 6/18/2017 Influenza Age 5 to Adult 8/1/2018 MEDICARE YEARLY EXAM 8/30/2018 DTaP/Tdap/Td series (2 - Td) 10/14/2025 COLONOSCOPY 8/29/2027 Allergies as of 5/9/2018  Review Complete On: 5/9/2018 By: Fidencio Ponce MD  
  
 Severity Noted Reaction Type Reactions Morphine High 03/08/2010    Rash Lisinopril  09/07/2011   Side Effect Other (comments)  
 dizziness Propranolol  03/09/2011   Side Effect Other (comments)  
 fatigue Current Immunizations  Reviewed on 7/24/2017 Name Date Tdap 10/14/2015 Not reviewed this visit You Were Diagnosed With   
  
 Codes Comments Acquired hypothyroidism    -  Primary ICD-10-CM: E03.9 ICD-9-CM: 244.9 Anxiety     ICD-10-CM: F41.9 ICD-9-CM: 300.00 Vitals BP Pulse Temp Resp Height(growth percentile) Weight(growth percentile)  116/62 61 96.2 °F (35.7 °C) (Oral) 14 5' 10\" (1.778 m) 136 lb 6.4 oz (61.9 kg) SpO2 BMI Smoking Status 97% 19.57 kg/m2 Former Smoker BMI and BSA Data Body Mass Index Body Surface Area  
 19.57 kg/m 2 1.75 m 2 Preferred Pharmacy Pharmacy Name Phone [facility] [phone] Your Updated Medication List  
  
   
This list is accurate as of 5/9/18 11:19 AM.  Always use your most recent med list.  
  
  
  
  
 ALPRAZolam 1 mg tablet Commonly known as:  XANAX  
TAKE ONE-HALF TO ONE TABLET BY MOUTH EVERY 12 HOURS AS NEEDED  
  
 amLODIPine 5 mg tablet Commonly known as:  Meng Veliz TAKE ONE TABLET BY MOUTH ONCE DAILY FOR BLOOD PRESSURE  
  
 levothyroxine 50 mcg tablet Commonly known as:  SYNTHROID Take 1 Tab by mouth Daily (before breakfast). For thyroid  
  
 pravastatin 40 mg tablet Commonly known as:  PRAVACHOL  
TAKE ONE TABLET BY MOUTH ONCE DAILY FOR CHOLESTEROL Prescriptions Printed Refills ALPRAZolam (XANAX) 1 mg tablet 3 Sig: TAKE ONE-HALF TO ONE TABLET BY MOUTH EVERY 12 HOURS AS NEEDED Class: Print We Performed the Following T4,FREE(DIRECT) [id number] CPT(R)] TSH 3RD GENERATION [88797 CPT(R)] To-Do List   
 07/19/2018 9:30 AM  
  Appointment with [facility] CT 1 at [facility] CT ([phone]) CONTRAST STUDY: 1. The patient should not eat solid food four hours before the appointment but should be encouraged to drink clear liquids. 2. The patient will require IV access for contrast administration. 3. The patient should not take  Ibuprofen (Advil, Motrin, etc.) and Naproxen Sodium (Aleve, etc.)  on the day of the exam. Stopping non-steroidal anti-inflammatory agents (NSAIDs) like Ibuprofen decreases the risk of kidney damage from the x-ray contrast (dye). 4. Bring any non [facility] facility films/images pertaining to the area of interest with you on the day of appointment.  5. Bring current lab work if available(within last 90 days [facility]) ***If scheduled at [facility], iSTAT is not available, labs will need to be done before appointment*** 6. Check in at registration at least 30 minutes before appt time unless you were instructed to do otherwise. 7. If you have to drink oral contrast please pick it up any weekday prior to your appointment, if you cannot please check in 2 hrs  before appt time. 07/19/2018 10:00 AM  
  Appointment with [facility] CT 1 at [facility] CT ([phone]) CONTRAST STUDY: 1. The patient should not eat solid food four hours before the appointment but should be encouraged to drink clear liquids. 2. The patient will require IV access for contrast administration. 3. The patient should not take  Ibuprofen (Advil, Motrin, etc.) and Naproxen Sodium (Aleve, etc.)  on the day of the exam. Stopping non-steroidal anti-inflammatory agents (NSAIDs) like Ibuprofen decreases the risk of kidney damage from the x-ray contrast (dye). 4. Bring any non [facility] facility films/images pertaining to the area of interest with you on the day of appointment. 5. Bring current lab work if available(within last 90 days [facility]) ***If scheduled at [facility], iSTAT is not available, labs will need to be done before appointment*** 6. Check in at registration at least 30 minutes before appt time unless you were instructed to do otherwise. 7. If you have to drink oral contrast please pick it up any weekday prior to your appointment, if you cannot please check in 2 hrs  before appt time. Introducing [facility] SERVICES! Dear Halima Mariano: Thank you for requesting a [employer] account. Our records indicate that you already have an active [employer] account. You can access your account anytime at [email or web address]. [email or web address] Technologies Did you know that you can access your hospital and ER discharge instructions at any time in [employer]? You can also review all of your test results from your hospital stay or ER visit. Additional Information If you have questions, please visit the Frequently Asked Questions section of the Tynkerhart website at [email or web address] Remember, [employer] is NOT to be used for urgent needs. For medical emergencies, dial 911. Now available from your iPhone and Android! Please provide this summary of care documentation to your next provider. Your primary care clinician is listed as Mario Meeks. If you have any questions after today's visit, please call [phone].

## 2018-05-10 LAB
T4 FREE SERPL-MCNC: 1.08 NG/DL (ref 0.82–1.77)
TSH SERPL DL<=0.005 MIU/L-ACNC: 2.71 UIU/ML (ref 0.45–4.5)

## 2018-07-19 ENCOUNTER — APPOINTMENT (OUTPATIENT)
Dept: CT IMAGING | Age: 73
End: 2018-07-19
Attending: INTERNAL MEDICINE
Payer: MEDICARE

## 2018-07-19 ENCOUNTER — HOSPITAL ENCOUNTER (OUTPATIENT)
Dept: CT IMAGING | Age: 73
Discharge: HOME | End: 2018-07-19
Attending: INTERNAL MEDICINE
Payer: MEDICARE

## 2018-07-19 DIAGNOSIS — C32.9 LARYNGEAL SQUAMOUS CELL CARCINOMA (HCC): ICD-10-CM

## 2018-07-19 LAB — CREAT BLD-MCNC: 0.9 MG/DL (ref 0.6–1.3)

## 2018-07-19 PROCEDURE — 82565 ASSAY OF CREATININE: CPT

## 2018-07-19 PROCEDURE — 74011250636 HC RX REV CODE- 250/636: Performed by: INTERNAL MEDICINE

## 2018-07-19 PROCEDURE — 70491 CT SOFT TISSUE NECK W/DYE: CPT

## 2018-07-19 PROCEDURE — 74011636320 HC RX REV CODE- 636/320: Performed by: INTERNAL MEDICINE

## 2018-07-19 PROCEDURE — 71260 CT THORAX DX C+: CPT

## 2018-07-19 RX ORDER — SODIUM CHLORIDE 0.9 % (FLUSH) 0.9 %
10 SYRINGE (ML) INJECTION
Status: COMPLETED | OUTPATIENT
Start: 2018-07-19 | End: 2018-07-19

## 2018-07-19 RX ORDER — SODIUM CHLORIDE 9 MG/ML
50 INJECTION, SOLUTION INTRAVENOUS
Status: COMPLETED | OUTPATIENT
Start: 2018-07-19 | End: 2018-07-19

## 2018-07-19 RX ADMIN — SODIUM CHLORIDE 50 ML/HR: 900 INJECTION, SOLUTION INTRAVENOUS at 09:33

## 2018-07-19 RX ADMIN — IOPAMIDOL 100 ML: 612 INJECTION, SOLUTION INTRAVENOUS at 09:32

## 2018-07-19 RX ADMIN — Medication 10 ML: at 09:33

## 2018-07-27 ENCOUNTER — OFFICE VISIT (OUTPATIENT)
Dept: ONCOLOGY | Age: 73
End: 2018-07-27

## 2018-07-27 VITALS
HEIGHT: 70 IN | WEIGHT: 134 LBS | TEMPERATURE: 97.6 F | DIASTOLIC BLOOD PRESSURE: 76 MMHG | HEART RATE: 63 BPM | RESPIRATION RATE: 18 BRPM | OXYGEN SATURATION: 96 % | BODY MASS INDEX: 19.18 KG/M2 | SYSTOLIC BLOOD PRESSURE: 135 MMHG

## 2018-07-27 DIAGNOSIS — J90 PLEURAL EFFUSION, RIGHT: ICD-10-CM

## 2018-07-27 DIAGNOSIS — C32.9 LARYNGEAL SQUAMOUS CELL CARCINOMA (HCC): Primary | ICD-10-CM

## 2018-07-27 NOTE — PROGRESS NOTES
Chief Complaint   Patient presents with    Other     laryngeal cancer     S/p chemords   Cmpl. 10/ 2016  CT chest 7/19/18 -ELE Munoz May 4/2018      1. Have you been to the ER, urgent care clinic since your last visit? Hospitalized since your last visit? no    2. Have you seen or consulted any other health care providers outside of the [address] [place] [address] since your last visit? Include any pap smears or colon screening.   No    Visit Vitals    /76 (BP 1 Location: Right arm, BP Patient Position: Sitting)    Pulse 63    Temp 97.6 °F (36.4 °C) (Oral)    Resp 18    Ht 5' 10\" (1.778 m)    Wt 134 lb (60.8 kg)    SpO2 96%    BMI 19.23 kg/m2

## 2018-07-27 NOTE — PROGRESS NOTES
Follow-up Note        Patient: Pilar Moon MRN: [id number]  SSN: [id number]    YOB: 1945  Age: 75 y.o. Sex: male        Diagnosis:     1. 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 Concurrent chemo/rads with cisplatin, completed 10/2016    Subjective:      Pilar Moon is a 75 y.o. male with a diagnosis of squamous cell laryngeal carcinoma. He was diagnosed by Dr. Alicia in July 2016. He has long history of pipe smoking. He quit all smoking more than 30 years ago. Mr. Jean Paul Duarte received concurrent radiation with weekly Cisplatin. CT shows resolving laryngeal mass. Laryngoscopy by Dr. Alicia on 1/19/2017 shows no tumor. Recent CT shows complete remission. He notes some taste alterations and excess oral mucus in the morning. He also says he has a dry mouth and uses biotin. His only complaint       Review of Systems:     Constitutional: negative  Eyes: negative  Ears, Nose, Mouth, Throat, and Face: dry mouth, nasal congestion  Respiratory: negative  Cardiovascular: negative  Gastrointestinal: poor taste  Genitourinary:negative  Integument/Breast: negative  Hematologic/Lymphatic: negative  Musculoskeletal:negative  Neurological: negative      Past Medical History:   Diagnosis Date    Acquired hypothyroidism 3/5/2018    HTN (hypertension)     Hypercholesterolemia     Hyperlipidemia     Laryngeal squamous cell carcinoma ([facility] [address] 2016    Subclinical hypothyroidism      Past Surgical History:   Procedure Laterality Date    HX ORTHOPAEDIC      Back surgery x3      Family History   Problem Relation Age of Onset    Dementia Mother     Alcohol abuse Father      Social History   Substance Use Topics    Smoking status: Former Smoker    Smokeless tobacco: Never Used    Alcohol use No      Prior to Admission medications    Medication Sig Start Date End Date Taking?  Authorizing Provider   ALPRAZolam [place] Re) 1 mg tablet TAKE ONE-HALF TO ONE TABLET BY MOUTH EVERY 12 HOURS AS NEEDED 5/9/18  Yes Michelle Cordon MD   levothyroxine (SYNTHROID) 50 mcg tablet Take 1 Tab by mouth Daily (before breakfast). For thyroid 3/5/18  Yes Michelle Cordon MD   pravastatin (PRAVACHOL) 40 mg tablet TAKE ONE TABLET BY MOUTH ONCE DAILY FOR CHOLESTEROL 2/26/18  Yes Michelle Cordon MD   amLODIPine (NORVASC) 5 mg tablet TAKE ONE TABLET BY MOUTH ONCE DAILY FOR BLOOD PRESSURE 1/17/18  Yes Michelle Cordon MD          Allergies   Allergen Reactions    Morphine Rash    Lisinopril Other (comments)     dizziness    Propranolol Other (comments)     fatigue           Objective:     Vitals:    07/27/18 0929   BP: 135/76   Pulse: 63   Resp: 18   Temp: 97.6 °F (36.4 °C)   TempSrc: Oral   SpO2: 96%   Weight: 134 lb (60.8 kg)   Height: 5' 10\" (1.778 m)        Physical Exam:    GENERAL: alert, cooperative  HEENT: throat dry  LYMPHATIC: Cervical, supraclavicular, and axillary nodes normal.   THROAT & NECK: oral thrush  LUNG: clear to auscultation bilaterally  HEART: regular rate and rhythm  ABDOMEN: soft, non-tender  EXTREMITIES: no cyanosis or edema  SKIN: Normal.  NEUROLOGIC: negative    CT Results (most recent):    Results from Hospital Encounter encounter on 07/19/18   CT NECK SOFT TISSUE W CONT   Narrative EXAM:  CT NECK SOFT TISSUE W CONT, CT CHEST W CONT    INDICATION:  head and neck cancer history of chemoradiation in 2016. COMPARISON: CT neck 1/18/2018. CONTRAST: 100 mL of Isovue-300. TECHNIQUE: Multislice helical CT was performed from the mid calvarium to the  diaphragm during uneventful rapid bolus intravenous contrast administration. Contiguous 2.5 mm axial images were reconstructed and lung and soft tissue  windows were generated. Coronal and sagittal reformations were generated. CT  dose reduction was achieved through use of a standardized protocol tailored for  this examination and automatic exposure control for dose modulation.      FINDINGS:  CT neck:  Stable posttreatment appearance of the supraglottic larynx with mild mucosal  edema in the aryepiglottic folds, but no evidence of recurrent mass. Stable post  radiation changes to the subcutaneous soft tissues with fat stranding noted. No  evidence of pathologically enlarged cervical lymph nodes. Visualized brain parenchyma is normal in appearance. Mild mucosal thickening in  the ethmoidal air cells and maxillary sinuses. Intraorbital contents are  unremarkable. The cervical vasculature is patent, with unchanged calcific  atherosclerosis of the bilateral carotid bifurcations. . No acute fracture or  aggressive osseous lesion. Multilevel degenerative changes of the cervical  spine. CT chest:  New small right pleural effusion. Unchanged severe emphysema. Unchanged scarring  in the anterior segment of the right upper lobe. Stable partially calcified 8 mm  nodule in the right lower lobe. Stable 6 mm nodule in the left upper lobe. Other  scattered calcified granulomas are unchanged. No new pulmonary nodules are  visualized. No new focal consolidation. Stable subcentimeter mediastinal lymph nodes. Stable 17 mm right hilar lymph  node. Heart size is normal. No pericardial effusion. Severe coronary artery  calcifications. Visualized upper abdomen is unremarkable. There is a new 3 mm sclerotic lesion  in the superior T12 vertebral body (series 602b image 76). No acute fracture. Impression IMPRESSION:     CT NECK:  1. Stable posttreatment changes without evidence of recurrent laryngeal  neoplasm. No cervical lymphadenopathy. CT CHEST:  1. Stable scattered calcified granulomas and subcentimeter pulmonary nodules. No  new nodules. 2. Stable mediastinal and right hilar lymph nodes. No new adenopathy. 3. New small right pleural effusion. 4. New 3 mm sclerotic lesion in the superior T12 vertebral body, indeterminate. Attention on follow-up. 5. Severe emphysema. Assessment:     1.  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 Completed concurrent chemo/rads 10/2016   Cisplatin weekly X 6    Laryngoscopy on 7/19/2018 - no     Asymptomatic  In remission      2. Dry mouth    > using salt water  > has mouth rinse          Plan:         > Repeat scans prior to next visit    > Follow-up appointment in 6 months           Signed by: Alysia Vazquez NP                     July 27, 2018          CC. Fanta Mckinney MD ([facility])  CC. Nickie Molina MD  CC.  Matthew Rivera MD

## 2018-07-27 NOTE — PROGRESS NOTES
Follow-up Note        Patient: Ki Leo MRN: [id number]  SSN: [id number]    YOB: 1945  Age: 75 y.o. Sex: male        Diagnosis:     1. 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 Concurrent chemo/rads with cisplatin, completed 10/2016    Subjective:      Ki Leo is a 75 y.o. male with a diagnosis of squamous cell laryngeal carcinoma. He was diagnosed by Dr. Alicia in July 2016. He has long history of pipe smoking. He quit all smoking more than 30 years ago. Mr. Deena Martinez received concurrent radiation with weekly Cisplatin. CT shows resolving laryngeal mass. Laryngoscopy by Dr. Alicia on 1/19/2017 shows no tumor. Recent CT shows complete remission. Review of Systems:     Constitutional: negative  Eyes: negative  Ears, Nose, Mouth, Throat, and Face: dry mouth, nasal congestion  Respiratory: negative  Cardiovascular: negative  Gastrointestinal: poor taste  Genitourinary:negative  Integument/Breast: negative  Hematologic/Lymphatic: negative  Musculoskeletal:negative  Neurological: negative      Past Medical History:   Diagnosis Date    Acquired hypothyroidism 3/5/2018    HTN (hypertension)     Hypercholesterolemia     Hyperlipidemia     Laryngeal squamous cell carcinoma ([facility] [address] 2016    Subclinical hypothyroidism      Past Surgical History:   Procedure Laterality Date    HX ORTHOPAEDIC      Back surgery x3      Family History   Problem Relation Age of Onset    Dementia Mother     Alcohol abuse Father      Social History   Substance Use Topics    Smoking status: Former Smoker    Smokeless tobacco: Never Used    Alcohol use No      Prior to Admission medications    Medication Sig Start Date End Date Taking?  Authorizing Provider   ALPRAZolam Valjean Thompson) 1 mg tablet TAKE ONE-HALF TO ONE TABLET BY MOUTH EVERY 12 HOURS AS NEEDED 5/9/18  Yes Christa Floyd MD   levothyroxine (SYNTHROID) 50 mcg tablet Take 1 Tab by mouth Daily (before breakfast). For thyroid 3/5/18  Yes Nickie Molina MD   pravastatin (PRAVACHOL) 40 mg tablet TAKE ONE TABLET BY MOUTH ONCE DAILY FOR CHOLESTEROL 2/26/18  Yes Nickie Molina MD   amLODIPine (NORVASC) 5 mg tablet TAKE ONE TABLET BY MOUTH ONCE DAILY FOR BLOOD PRESSURE 1/17/18  Yes Nickie Molina MD          Allergies   Allergen Reactions    Morphine Rash    Lisinopril Other (comments)     dizziness    Propranolol Other (comments)     fatigue           Objective:     Vitals:    07/27/18 0929   BP: 135/76   Pulse: 63   Resp: 18   Temp: 97.6 °F (36.4 °C)   TempSrc: Oral   SpO2: 96%   Weight: 134 lb (60.8 kg)   Height: 5' 10\" (1.778 m)        Physical Exam:    GENERAL: alert, cooperative  HEENT: throat dry  LYMPHATIC: Cervical, supraclavicular, and axillary nodes normal.   THROAT & NECK: oral thrush  LUNG: clear to auscultation bilaterally  HEART: regular rate and rhythm  ABDOMEN: soft, non-tender  EXTREMITIES: no cyanosis or edema  SKIN: Normal.  NEUROLOGIC: negative    CT Results (most recent):    Results from Hospital Encounter encounter on 07/19/18   CT NECK SOFT TISSUE W CONT   Narrative EXAM:  CT NECK SOFT TISSUE W CONT, CT CHEST W CONT    INDICATION:  head and neck cancer history of chemoradiation in 2016. COMPARISON: CT neck 1/18/2018. CONTRAST: 100 mL of Isovue-300. TECHNIQUE: Multislice helical CT was performed from the mid calvarium to the  diaphragm during uneventful rapid bolus intravenous contrast administration. Contiguous 2.5 mm axial images were reconstructed and lung and soft tissue  windows were generated. Coronal and sagittal reformations were generated. CT  dose reduction was achieved through use of a standardized protocol tailored for  this examination and automatic exposure control for dose modulation. FINDINGS:  CT neck:  Stable posttreatment appearance of the supraglottic larynx with mild mucosal  edema in the aryepiglottic folds, but no evidence of recurrent mass. Stable post  radiation changes to the subcutaneous soft tissues with fat stranding noted. No  evidence of pathologically enlarged cervical lymph nodes. Visualized brain parenchyma is normal in appearance. Mild mucosal thickening in  the ethmoidal air cells and maxillary sinuses. Intraorbital contents are  unremarkable. The cervical vasculature is patent, with unchanged calcific  atherosclerosis of the bilateral carotid bifurcations. . No acute fracture or  aggressive osseous lesion. Multilevel degenerative changes of the cervical  spine. CT chest:  New small right pleural effusion. Unchanged severe emphysema. Unchanged scarring  in the anterior segment of the right upper lobe. Stable partially calcified 8 mm  nodule in the right lower lobe. Stable 6 mm nodule in the left upper lobe. Other  scattered calcified granulomas are unchanged. No new pulmonary nodules are  visualized. No new focal consolidation. Stable subcentimeter mediastinal lymph nodes. Stable 17 mm right hilar lymph  node. Heart size is normal. No pericardial effusion. Severe coronary artery  calcifications. Visualized upper abdomen is unremarkable. There is a new 3 mm sclerotic lesion  in the superior T12 vertebral body (series 602b image 76). No acute fracture. Impression IMPRESSION:     CT NECK:  1. Stable posttreatment changes without evidence of recurrent laryngeal  neoplasm. No cervical lymphadenopathy. CT CHEST:  1. Stable scattered calcified granulomas and subcentimeter pulmonary nodules. No  new nodules. 2. Stable mediastinal and right hilar lymph nodes. No new adenopathy. 3. New small right pleural effusion. 4. New 3 mm sclerotic lesion in the superior T12 vertebral body, indeterminate. Attention on follow-up. 5. Severe emphysema. Assessment:     1.  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 Completed concurrent chemo/rads 10/2016   Cisplatin weekly X 6    CT (7/2018) - complete remission  Asymptomatic  In remission      Plan:       > Follow-up in Jan 2019  > CT neck and chest prior to the visit. Signed by: Fish Sanchez MD                     July 27, 2018          CC. Liana Bell MD ([facility])  CC. Alysa Galvez MD  CC.  Jered Jameson MD

## 2018-08-22 ENCOUNTER — OFFICE VISIT (OUTPATIENT)
Dept: FAMILY MEDICINE CLINIC | Age: 73
End: 2018-08-22

## 2018-08-22 VITALS
RESPIRATION RATE: 14 BRPM | HEART RATE: 60 BPM | TEMPERATURE: 96.5 F | HEIGHT: 70 IN | OXYGEN SATURATION: 97 % | DIASTOLIC BLOOD PRESSURE: 60 MMHG | WEIGHT: 136 LBS | BODY MASS INDEX: 19.47 KG/M2 | SYSTOLIC BLOOD PRESSURE: 132 MMHG

## 2018-08-22 DIAGNOSIS — I10 ESSENTIAL HYPERTENSION: ICD-10-CM

## 2018-08-22 DIAGNOSIS — E03.9 ACQUIRED HYPOTHYROIDISM: Primary | ICD-10-CM

## 2018-08-22 DIAGNOSIS — E78.00 PURE HYPERCHOLESTEROLEMIA: ICD-10-CM

## 2018-08-22 NOTE — MR AVS SNAPSHOT
Susan Salas 
 
 
 [address] [facility] [address] [facility] [id number] 
[phone] Patient: Mary Caputo MRN: [id number] VVB:3/6/2322 Visit Information Date & Time Provider Department Dept. Phone Encounter #  
 8/22/2018  8:45 AM Anna Kaufman MD [facility] [phone] [id number] Follow-up Instructions Return in about 6 months (around 2/22/2019). Your Appointments 1/11/2019  9:45 AM  
ESTABLISHED PATIENT with Cooper Anderson MD  
[address] [facility]) Appt Note: onc f/u  
 [address] [id number]  
[address] [place] [address] [address] [facility] [address] Toby Ward Upcoming Health Maintenance Date Due  
 GLAUCOMA SCREENING Q2Y 6/18/2017 Influenza Age 5 to Adult 8/1/2018 MEDICARE YEARLY EXAM 8/30/2018 DTaP/Tdap/Td series (2 - Td) 10/14/2025 COLONOSCOPY 8/29/2027 Allergies as of 8/22/2018  Review Complete On: 8/22/2018 By: Anna Kaufman MD  
  
 Severity Noted Reaction Type Reactions Morphine High 03/08/2010    Rash Lisinopril  09/07/2011   Side Effect Other (comments)  
 dizziness Propranolol  03/09/2011   Side Effect Other (comments)  
 fatigue Current Immunizations  Reviewed on 7/24/2017 Name Date Tdap 10/14/2015 Not reviewed this visit You Were Diagnosed With   
  
 Codes Comments Acquired hypothyroidism    -  Primary ICD-10-CM: E03.9 ICD-9-CM: 244.9 Pure hypercholesterolemia     ICD-10-CM: E78.00 ICD-9-CM: 272.0 Essential hypertension     ICD-10-CM: I10 
ICD-9-CM: 401.9 Vitals BP Pulse Temp Resp Height(growth percentile) Weight(growth percentile) 132/60 60 96.5 °F (35.8 °C) (Oral) 14 5' 10\" (1.778 m) 136 lb (61.7 kg) SpO2 BMI Smoking Status 97% 19.51 kg/m2 Former Smoker Vitals History BMI and BSA Data Body Mass Index Body Surface Area  
 19.51 kg/m 2 1.75 m 2 Preferred Pharmacy Pharmacy Name Phone [address] [phone] Your Updated Medication List  
  
   
This list is accurate as of 8/22/18  9:52 AM.  Always use your most recent med list.  
  
  
  
  
 ALPRAZolam 1 mg tablet Commonly known as:  XANAX  
TAKE ONE-HALF TO ONE TABLET BY MOUTH EVERY 12 HOURS AS NEEDED  
  
 amLODIPine 5 mg tablet Commonly known as:  Libia Fraction TAKE ONE TABLET BY MOUTH ONCE DAILY FOR BLOOD PRESSURE  
  
 levothyroxine 50 mcg tablet Commonly known as:  SYNTHROID Take 1 Tab by mouth Daily (before breakfast). For thyroid  
  
 pravastatin 40 mg tablet Commonly known as:  PRAVACHOL  
TAKE ONE TABLET BY MOUTH ONCE DAILY FOR CHOLESTEROL We Performed the Following CBC WITH AUTOMATED DIFF [62971 CPT(R)] LIPID PANEL [30613 CPT(R)] METABOLIC PANEL, COMPREHENSIVE [66438 CPT(R)] TSH 3RD GENERATION [54439 CPT(R)] Follow-up Instructions Return in about 6 months (around 2/22/2019). To-Do List   
 01/08/2019 9:30 AM  
  [facility] [address] [facility] [address] [facility] ([phone]) CONTRAST STUDY: 1. The patient should not eat solid food four hours before the appointment but should be encouraged to drink clear liquids. 2. The patient will require IV access for contrast administration. 3. The patient should not take  Ibuprofen (Advil, Motrin, etc.) and Naproxen Sodium (Aleve, etc.)  on the day of the exam. Stopping non-steroidal anti-inflammatory agents (NSAIDs) like Ibuprofen decreases the risk of kidney damage from the x-ray contrast (dye). 4. [facility] films/images pertaining to the area of interest with you on the day of appointment.  5. Bring current lab work if available(within last 90 days [facility]) ***If scheduled at [facility], iSTAT is not available, labs will need to be done before appointment*** 6. Check in at registration at least 30 minutes before appt time unless you were instructed to do otherwise. 7. If you have to drink oral contrast please pick it up any weekday prior to your appointment, if you cannot please check in 2 hrs  before appt time. 01/08/2019 10:00 AM  
  Appointment with [facility] CT 2 at [facility] CT ([phone]) CONTRAST STUDY: 1. The patient should not eat solid food four hours before the appointment but should be encouraged to drink clear liquids. 2. The patient will require IV access for contrast administration. 3. The patient should not take  Ibuprofen (Advil, Motrin, etc.) and Naproxen Sodium (Aleve, etc.)  on the day of the exam. Stopping non-steroidal anti-inflammatory agents (NSAIDs) like Ibuprofen decreases the risk of kidney damage from the x-ray contrast (dye). 4. Bring any non [facility] facility films/images pertaining to the area of interest with you on the day of appointment. 5. Bring current lab work if available(within last 90 days [facility]) ***If scheduled at [facility], iSTAT is not available, labs will need to be done before appointment*** 6. Check in at registration at least 30 minutes before appt time unless you were instructed to do otherwise. 7. If you have to drink oral contrast please pick it up any weekday prior to your appointment, if you cannot please check in 2 hrs  before appt time. Introducing [facility]! Dear Adithya Wen: Thank you for requesting a [employer] account. Our records indicate that you already have an active [employer] account. You can access your account anytime at [email or web address] Did you know that you can access your hospital and ER discharge instructions at any time in [employer]? You can also review all of your test results from your hospital stay or ER visit. Additional Information If you have questions, please visit the Frequently Asked Questions section of the [employer] website at [email or web address] Remember, [employer] is NOT to be used for urgent needs. For medical emergencies, dial 911. Now available from your iPhone and Android! Please provide this summary of care documentation to your next provider. Your primary care clinician is listed as Lizbeth Baez. If you have any questions after today's visit, please call [phone].

## 2018-08-22 NOTE — PROGRESS NOTES
HISTORY OF PRESENT ILLNESS  Doris Carey is a 75 y.o. male. HPI In for followup. Some aching in legs at times. Needs thyroid and cholesterol checked. Last saw Dr. Alicia this month- was given a good report. Needs blood pressure checked. Leg pain is in calves, seems to be brought on by walking. Hx of low back pain also. symtoms are intermittent. Breathing has been doing ok. Has some problems with runny nose, uses flonase once daily- seems to help. Some itching in eyes also. Took some antihistamines, but they made voiding difficult. ROS    Physical Exam   Constitutional: He appears well-developed and well-nourished. HENT:   Right Ear: External ear normal.   Left Ear: External ear normal.   Mouth/Throat: Oropharynx is clear and moist.   Neck: No thyromegaly present. Cardiovascular: Normal rate, regular rhythm, normal heart sounds and intact distal pulses. Bilateral femoral bruits   Pulmonary/Chest: Effort normal and breath sounds normal. No respiratory distress. He has no wheezes. Abdominal: Soft. Bowel sounds are normal. He exhibits no distension and no mass. There is no tenderness. There is no guarding. Musculoskeletal: Normal range of motion. He exhibits no edema. Lymphadenopathy:     He has no cervical adenopathy. Nursing note and vitals reviewed. ASSESSMENT and PLAN  Orders Placed This Encounter    METABOLIC PANEL, COMPREHENSIVE    CBC WITH AUTOMATED DIFF    TSH 3RD GENERATION    LIPID PANEL     Diagnoses and all orders for this visit:    1. Acquired hypothyroidism  -     TSH 3RD GENERATION    2. Pure hypercholesterolemia  -     LIPID PANEL    3. Essential hypertension  -     METABOLIC PANEL, COMPREHENSIVE  -     CBC WITH AUTOMATED DIFF      Follow-up Disposition:  Return in about 6 months (around 2/22/2019).   Claudication is probably secondary to spinal stenosis

## 2018-08-22 NOTE — PROGRESS NOTES
Chief Complaint   Patient presents with    Cancer Survivorship    Hypertension    Cholesterol Problem    Thyroid Problem    COPD     1. Have you been to the ER, urgent care clinic since your last visit? Hospitalized since your last visit? No    2. Have you seen or consulted any other health care providers outside of the [address] [place] [address] since your last visit? Include any pap smears or colon screening.  No     Health Maintenance Due   Topic Date Due    GLAUCOMA SCREENING Q2Y  06/18/2017    Influenza Age 5 to Adult  08/01/2018

## 2018-08-23 LAB
ALBUMIN SERPL-MCNC: 4.5 G/DL (ref 3.5–4.8)
ALBUMIN/GLOB SERPL: 1.5 {RATIO} (ref 1.2–2.2)
ALP SERPL-CCNC: 68 IU/L (ref 39–117)
ALT SERPL-CCNC: 16 IU/L (ref 0–44)
AST SERPL-CCNC: 29 IU/L (ref 0–40)
BASOPHILS # BLD AUTO: 0 X10E3/UL (ref 0–0.2)
BASOPHILS NFR BLD AUTO: 0 %
BILIRUB SERPL-MCNC: 0.4 MG/DL (ref 0–1.2)
BUN SERPL-MCNC: 12 MG/DL (ref 8–27)
BUN/CREAT SERPL: 13 (ref 10–24)
CALCIUM SERPL-MCNC: 9.5 MG/DL (ref 8.6–10.2)
CHLORIDE SERPL-SCNC: 96 MMOL/L (ref 96–106)
CHOLEST SERPL-MCNC: 142 MG/DL (ref 100–199)
CO2 SERPL-SCNC: 23 MMOL/L (ref 20–29)
CREAT SERPL-MCNC: 0.89 MG/DL (ref 0.76–1.27)
EOSINOPHIL # BLD AUTO: 0.4 X10E3/UL (ref 0–0.4)
EOSINOPHIL NFR BLD AUTO: 6 %
ERYTHROCYTE [DISTWIDTH] IN BLOOD BY AUTOMATED COUNT: 13.6 % (ref 12.3–15.4)
GLOBULIN SER CALC-MCNC: 3 G/DL (ref 1.5–4.5)
GLUCOSE SERPL-MCNC: 94 MG/DL (ref 65–99)
HCT VFR BLD AUTO: 39.6 % (ref 37.5–51)
HDLC SERPL-MCNC: 34 MG/DL
HGB BLD-MCNC: 13.4 G/DL (ref 13–17.7)
IMM GRANULOCYTES # BLD: 0 X10E3/UL (ref 0–0.1)
IMM GRANULOCYTES NFR BLD: 0 %
INTERPRETATION, 910389: NORMAL
LDLC SERPL CALC-MCNC: 81 MG/DL (ref 0–99)
LYMPHOCYTES # BLD AUTO: 0.6 X10E3/UL (ref 0.7–3.1)
LYMPHOCYTES NFR BLD AUTO: 10 %
MCH RBC QN AUTO: 30.5 PG (ref 26.6–33)
MCHC RBC AUTO-ENTMCNC: 33.8 G/DL (ref 31.5–35.7)
MCV RBC AUTO: 90 FL (ref 79–97)
MONOCYTES # BLD AUTO: 0.6 X10E3/UL (ref 0.1–0.9)
MONOCYTES NFR BLD AUTO: 9 %
NEUTROPHILS # BLD AUTO: 4.5 X10E3/UL (ref 1.4–7)
NEUTROPHILS NFR BLD AUTO: 75 %
PLATELET # BLD AUTO: 217 X10E3/UL (ref 150–379)
POTASSIUM SERPL-SCNC: 4 MMOL/L (ref 3.5–5.2)
PROT SERPL-MCNC: 7.5 G/DL (ref 6–8.5)
RBC # BLD AUTO: 4.4 X10E6/UL (ref 4.14–5.8)
SODIUM SERPL-SCNC: 135 MMOL/L (ref 134–144)
TRIGL SERPL-MCNC: 135 MG/DL (ref 0–149)
TSH SERPL DL<=0.005 MIU/L-ACNC: 3.05 UIU/ML (ref 0.45–4.5)
VLDLC SERPL CALC-MCNC: 27 MG/DL (ref 5–40)
WBC # BLD AUTO: 6.1 X10E3/UL (ref 3.4–10.8)

## 2018-12-11 ENCOUNTER — TELEPHONE (OUTPATIENT)
Dept: FAMILY MEDICINE CLINIC | Age: 73
End: 2018-12-11

## 2018-12-11 ENCOUNTER — OFFICE VISIT (OUTPATIENT)
Dept: FAMILY MEDICINE CLINIC | Age: 73
End: 2018-12-11

## 2018-12-11 ENCOUNTER — HOSPITAL ENCOUNTER (OUTPATIENT)
Dept: LAB | Age: 73
Discharge: HOME | End: 2018-12-11

## 2018-12-11 VITALS
WEIGHT: 136 LBS | BODY MASS INDEX: 19.47 KG/M2 | DIASTOLIC BLOOD PRESSURE: 69 MMHG | HEIGHT: 70 IN | HEART RATE: 84 BPM | SYSTOLIC BLOOD PRESSURE: 123 MMHG | OXYGEN SATURATION: 98 % | RESPIRATION RATE: 14 BRPM | TEMPERATURE: 96.6 F

## 2018-12-11 DIAGNOSIS — C44.519 BASAL CELL CARCINOMA (BCC) OF BACK: ICD-10-CM

## 2018-12-11 DIAGNOSIS — L98.9 SKIN LESION OF BACK: Primary | ICD-10-CM

## 2018-12-11 NOTE — PROGRESS NOTES
Chief Complaint Patient presents with  
 Skin Problem  
  itchy area   possible exposure to thorny plant 3 weeks ago   below right scapula area (approx) Yrn.Abu Annual Wellness Visit 1. Have you been to the ER, urgent care clinic since your last visit? Hospitalized since your last visit? No 
 
2. Have you seen or consulted any other health care providers outside of the [address] [place] [address] since your last visit? Include any pap smears or colon screening. No  
 
Health Maintenance Due Topic Date Due  Shingrix Vaccine Age 50> (1 of 2) 06/09/1994  GLAUCOMA SCREENING Q2Y  06/18/2017  MEDICARE YEARLY EXAM  08/30/2018

## 2018-12-11 NOTE — PROGRESS NOTES
HISTORY OF PRESENT ILLNESS  Flor Leach is a 75 y.o. male. HPI3 week hx of a bump on r shoulder area, thinks that may have been stuck by a thorn while relic hunting. Bump is somewhat uncomfortable. Is also somewhat pruritic. ROS    Physical Exam   Skin:   Back - 1 x 1 cm nodular firm mass with central dimpling and early ulceration        ASSESSMENT and PLAN  Orders Placed This Encounter    EXC SKIN MALIG 0.6-1CM TRUNK,ARM,LEG    SURGICAL PATHOLOGY     Standing Status:   Future     Number of Occurrences:   1     Standing Expiration Date:   1/11/2019     Order Specific Question:   Type of Specimen and Locations? Answer:   1 x 1 cm nodular skin lesion r posterior thoracic area. rule out squamous or basal skin cancer     Order Specific Question:   Patient's clinical history:     Answer:   one month hx enlarging mass R posterior thoracic area. hx laryngeal cancer     Order Specific Question:   Clinical history of patient: Answer:   as aboe     Orders Placed This Encounter    EXC SKIN MALIG 0.6-1CM TRUNK,ARM,LEG    SURGICAL PATHOLOGY     Diagnoses and all orders for this visit:    1. Skin lesion of back    2. Basal cell carcinoma (BCC) of back  -     SURGICAL PATHOLOGY; Future  -     EXC SKIN MALIG 0.6-1CM TRUNK,ARM,LEG      Follow-up Disposition:  Return in about 2 weeks (around 12/25/2018).

## 2018-12-11 NOTE — PROGRESS NOTES
[address]  OFFICE PROCEDURE PROGRESS NOTE        Chart reviewed for the following:   Deng Walter LPN, have reviewed the History, Physical and updated the Allergic reactions for [address] performed immediately prior to start of procedure:   IPercy LPN, have performed the following reviews on Nilesh Cotton prior to the start of the procedure:            * Patient was identified by name and date of birth   * Agreement on procedure being performed was verified  * Risks and Benefits explained to the patient  * Procedure site verified and marked as necessary  * Patient was positioned for comfort  * Consent was signed and verified     Time: 12:12PM      Date of procedure: 12/11/2018    Procedure performed by:  Tonja Domínguez MD    Provider assisted by: Percy Romero LPN    Patient assisted by: Percy Romero LPN    How tolerated by patient:  Pt tolerated procedure well    Post Procedural Pain Scale:  Pt reported pain at a \"1\"    Comments: Removal of Lesion from near Right scapula on back.   Tissue sample sent to lab (histology)

## 2018-12-26 ENCOUNTER — OFFICE VISIT (OUTPATIENT)
Dept: FAMILY MEDICINE CLINIC | Age: 73
End: 2018-12-26

## 2018-12-26 VITALS
TEMPERATURE: 95.2 F | RESPIRATION RATE: 14 BRPM | BODY MASS INDEX: 19.51 KG/M2 | DIASTOLIC BLOOD PRESSURE: 69 MMHG | OXYGEN SATURATION: 93 % | HEIGHT: 70 IN | HEART RATE: 58 BPM | SYSTOLIC BLOOD PRESSURE: 146 MMHG

## 2018-12-26 DIAGNOSIS — C44.92 CANCER OF SKIN, SQUAMOUS CELL: Primary | ICD-10-CM

## 2018-12-26 NOTE — PROGRESS NOTES
Chief Complaint   Patient presents with    Suture Removal     1. Have you been to the ER, urgent care clinic since your last visit? Hospitalized since your last visit? No    2. Have you seen or consulted any other health care providers outside of the [address] [place] [address] since your last visit? Include any pap smears or colon screening.  No     Health Maintenance Due   Topic Date Due    Shingrix Vaccine Age 49> (1 of 2) 06/09/1994    GLAUCOMA SCREENING Q2Y  06/18/2017    MEDICARE YEARLY EXAM  08/30/2018

## 2018-12-26 NOTE — PROGRESS NOTES
HISTORY OF PRESENT ILLNESS  Baudilio Frazier is a 75 y.o. male. HPI In for followup. Sp removal of squamous cell ca. margins uninvolved    ROS    Physical Exam back-wound looks good, no signs infection. ASSESSMENT and PLAN  No orders of the defined types were placed in this encounter. Diagnoses and all orders for this visit:    1.  Cancer of skin, squamous cell      Follow-up Disposition: Not on File  Sutures removed

## 2019-01-01 ENCOUNTER — HOSPITAL ENCOUNTER (OUTPATIENT)
Dept: INFUSION THERAPY | Age: 74
Discharge: HOME | End: 2019-04-29
Payer: MEDICARE

## 2019-01-01 ENCOUNTER — HOME CARE VISIT (OUTPATIENT)
Dept: SCHEDULING | Facility: HOME HEALTH | Age: 74
End: 2019-01-01
Payer: MEDICARE

## 2019-01-01 ENCOUNTER — OFFICE VISIT (OUTPATIENT)
Dept: ONCOLOGY | Age: 74
End: 2019-01-01

## 2019-01-01 ENCOUNTER — HOSPITAL ENCOUNTER (OUTPATIENT)
Dept: CT IMAGING | Age: 74
End: 2019-01-01
Attending: INTERNAL MEDICINE
Payer: MEDICARE

## 2019-01-01 ENCOUNTER — HOME CARE VISIT (OUTPATIENT)
Dept: HOSPICE | Facility: HOSPICE | Age: 74
End: 2019-01-01
Payer: MEDICARE

## 2019-01-01 ENCOUNTER — HOSPITAL ENCOUNTER (OUTPATIENT)
Dept: INFUSION THERAPY | Age: 74
Discharge: HOME | End: 2019-06-10
Payer: MEDICARE

## 2019-01-01 ENCOUNTER — TELEPHONE (OUTPATIENT)
Dept: PALLATIVE CARE | Age: 74
End: 2019-01-01

## 2019-01-01 ENCOUNTER — HOSPITAL ENCOUNTER (EMERGENCY)
Age: 74
Discharge: HOME | End: 2019-07-02
Attending: EMERGENCY MEDICINE | Admitting: EMERGENCY MEDICINE
Payer: MEDICARE

## 2019-01-01 ENCOUNTER — APPOINTMENT (OUTPATIENT)
Dept: GENERAL RADIOLOGY | Age: 74
End: 2019-01-01
Attending: STUDENT IN AN ORGANIZED HEALTH CARE EDUCATION/TRAINING PROGRAM
Payer: MEDICARE

## 2019-01-01 ENCOUNTER — HOSPITAL ENCOUNTER (INPATIENT)
Age: 74
LOS: 4 days | Discharge: HOSPICE | DRG: 180 | End: 2019-07-22
Attending: EMERGENCY MEDICINE | Admitting: INTERNAL MEDICINE
Payer: MEDICARE

## 2019-01-01 ENCOUNTER — OFFICE VISIT (OUTPATIENT)
Dept: FAMILY MEDICINE CLINIC | Age: 74
End: 2019-01-01

## 2019-01-01 ENCOUNTER — TELEPHONE (OUTPATIENT)
Dept: ONCOLOGY | Age: 74
End: 2019-01-01

## 2019-01-01 ENCOUNTER — HOSPITAL ENCOUNTER (OUTPATIENT)
Dept: MRI IMAGING | Age: 74
Discharge: HOME | End: 2019-02-21
Attending: INTERNAL MEDICINE
Payer: MEDICARE

## 2019-01-01 ENCOUNTER — HOSPITAL ENCOUNTER (OUTPATIENT)
Dept: INFUSION THERAPY | Age: 74
End: 2019-01-01

## 2019-01-01 ENCOUNTER — HOSPITAL ENCOUNTER (OUTPATIENT)
Dept: CT IMAGING | Age: 74
Discharge: HOME | End: 2019-06-20
Attending: INTERNAL MEDICINE
Payer: MEDICARE

## 2019-01-01 ENCOUNTER — HOSPITAL ENCOUNTER (OUTPATIENT)
Dept: NUCLEAR MEDICINE | Age: 74
Discharge: HOME | End: 2019-06-20
Attending: INTERNAL MEDICINE
Payer: MEDICARE

## 2019-01-01 ENCOUNTER — HOSPITAL ENCOUNTER (OUTPATIENT)
Dept: INFUSION THERAPY | Age: 74
Discharge: HOME | End: 2019-05-20
Payer: MEDICARE

## 2019-01-01 ENCOUNTER — HOSPITAL ENCOUNTER (OUTPATIENT)
Dept: CT IMAGING | Age: 74
Discharge: HOME | End: 2019-07-02
Attending: INTERNAL MEDICINE
Payer: MEDICARE

## 2019-01-01 ENCOUNTER — HOSPICE ADMISSION (OUTPATIENT)
Dept: HOSPICE | Facility: HOSPICE | Age: 74
End: 2019-01-01
Payer: MEDICARE

## 2019-01-01 ENCOUNTER — PATIENT OUTREACH (OUTPATIENT)
Dept: FAMILY MEDICINE CLINIC | Age: 74
End: 2019-01-01

## 2019-01-01 ENCOUNTER — NURSE NAVIGATOR (OUTPATIENT)
Dept: PALLATIVE CARE | Age: 74
End: 2019-01-01

## 2019-01-01 ENCOUNTER — OFFICE VISIT (OUTPATIENT)
Dept: PALLATIVE CARE | Age: 74
End: 2019-01-01

## 2019-01-01 ENCOUNTER — APPOINTMENT (OUTPATIENT)
Dept: INFUSION THERAPY | Age: 74
End: 2019-01-01

## 2019-01-01 ENCOUNTER — APPOINTMENT (OUTPATIENT)
Dept: GENERAL RADIOLOGY | Age: 74
DRG: 180 | End: 2019-01-01
Attending: EMERGENCY MEDICINE
Payer: MEDICARE

## 2019-01-01 ENCOUNTER — HOSPITAL ENCOUNTER (OUTPATIENT)
Dept: CT IMAGING | Age: 74
Discharge: HOME | End: 2019-03-04
Attending: INTERNAL MEDICINE
Payer: MEDICARE

## 2019-01-01 ENCOUNTER — HOSPITAL ENCOUNTER (OUTPATIENT)
Dept: INTERVENTIONAL RADIOLOGY/VASCULAR | Age: 74
Discharge: HOME | End: 2019-03-14
Attending: INTERNAL MEDICINE
Payer: MEDICARE

## 2019-01-01 ENCOUNTER — APPOINTMENT (OUTPATIENT)
Dept: CT IMAGING | Age: 74
End: 2019-01-01
Attending: INTERNAL MEDICINE
Payer: MEDICARE

## 2019-01-01 ENCOUNTER — DOCUMENTATION ONLY (OUTPATIENT)
Dept: FAMILY MEDICINE CLINIC | Age: 74
End: 2019-01-01

## 2019-01-01 ENCOUNTER — HOSPITAL ENCOUNTER (OUTPATIENT)
Dept: INFUSION THERAPY | Age: 74
Discharge: HOME | End: 2019-07-01
Payer: MEDICARE

## 2019-01-01 ENCOUNTER — HOSPITAL ENCOUNTER (OUTPATIENT)
Dept: CT IMAGING | Age: 74
Discharge: HOME | End: 2019-03-19
Attending: INTERNAL MEDICINE
Payer: MEDICARE

## 2019-01-01 ENCOUNTER — APPOINTMENT (OUTPATIENT)
Dept: GENERAL RADIOLOGY | Age: 74
DRG: 180 | End: 2019-01-01
Attending: FAMILY MEDICINE
Payer: MEDICARE

## 2019-01-01 ENCOUNTER — HOSPITAL ENCOUNTER (OUTPATIENT)
Dept: INTERVENTIONAL RADIOLOGY/VASCULAR | Age: 74
Discharge: HOME | DRG: 180 | End: 2019-07-19
Attending: INTERNAL MEDICINE
Payer: MEDICARE

## 2019-01-01 ENCOUNTER — HOSPITAL ENCOUNTER (OUTPATIENT)
Dept: INFUSION THERAPY | Age: 74
Discharge: HOME | End: 2019-04-08
Payer: MEDICARE

## 2019-01-01 ENCOUNTER — APPOINTMENT (OUTPATIENT)
Dept: ULTRASOUND IMAGING | Age: 74
End: 2019-01-01
Attending: EMERGENCY MEDICINE
Payer: MEDICARE

## 2019-01-01 ENCOUNTER — HOSPITAL ENCOUNTER (OUTPATIENT)
Dept: LAB | Age: 74
Discharge: HOME | End: 2019-02-26
Payer: MEDICARE

## 2019-01-01 ENCOUNTER — TELEPHONE ANTICOAG (OUTPATIENT)
Dept: ONCOLOGY | Age: 74
End: 2019-01-01

## 2019-01-01 VITALS
HEART RATE: 57 BPM | RESPIRATION RATE: 18 BRPM | OXYGEN SATURATION: 94 % | WEIGHT: 137.5 LBS | BODY MASS INDEX: 19.69 KG/M2 | HEIGHT: 70 IN | SYSTOLIC BLOOD PRESSURE: 125 MMHG | DIASTOLIC BLOOD PRESSURE: 66 MMHG | TEMPERATURE: 96.7 F

## 2019-01-01 VITALS
BODY MASS INDEX: 19.17 KG/M2 | HEART RATE: 91 BPM | DIASTOLIC BLOOD PRESSURE: 85 MMHG | HEIGHT: 70 IN | RESPIRATION RATE: 18 BRPM | WEIGHT: 133.9 LBS | TEMPERATURE: 97.6 F | OXYGEN SATURATION: 96 % | SYSTOLIC BLOOD PRESSURE: 167 MMHG

## 2019-01-01 VITALS
TEMPERATURE: 97.7 F | HEIGHT: 70 IN | BODY MASS INDEX: 19.07 KG/M2 | RESPIRATION RATE: 18 BRPM | WEIGHT: 133.2 LBS | OXYGEN SATURATION: 95 % | SYSTOLIC BLOOD PRESSURE: 150 MMHG | DIASTOLIC BLOOD PRESSURE: 78 MMHG | HEART RATE: 70 BPM

## 2019-01-01 VITALS
HEIGHT: 70 IN | BODY MASS INDEX: 18.61 KG/M2 | SYSTOLIC BLOOD PRESSURE: 113 MMHG | OXYGEN SATURATION: 94 % | TEMPERATURE: 97.5 F | RESPIRATION RATE: 18 BRPM | WEIGHT: 130 LBS | HEART RATE: 88 BPM | DIASTOLIC BLOOD PRESSURE: 71 MMHG

## 2019-01-01 VITALS
RESPIRATION RATE: 18 BRPM | HEART RATE: 76 BPM | DIASTOLIC BLOOD PRESSURE: 69 MMHG | BODY MASS INDEX: 19.37 KG/M2 | WEIGHT: 135.3 LBS | TEMPERATURE: 97.7 F | HEIGHT: 70 IN | SYSTOLIC BLOOD PRESSURE: 116 MMHG

## 2019-01-01 VITALS
RESPIRATION RATE: 18 BRPM | OXYGEN SATURATION: 92 % | HEART RATE: 75 BPM | WEIGHT: 138.4 LBS | BODY MASS INDEX: 19.81 KG/M2 | HEIGHT: 70 IN | DIASTOLIC BLOOD PRESSURE: 77 MMHG | SYSTOLIC BLOOD PRESSURE: 154 MMHG | TEMPERATURE: 97.7 F

## 2019-01-01 VITALS
SYSTOLIC BLOOD PRESSURE: 133 MMHG | DIASTOLIC BLOOD PRESSURE: 78 MMHG | HEIGHT: 70 IN | HEART RATE: 64 BPM | BODY MASS INDEX: 19.69 KG/M2 | TEMPERATURE: 97.8 F | WEIGHT: 137.5 LBS | OXYGEN SATURATION: 94 % | RESPIRATION RATE: 18 BRPM

## 2019-01-01 VITALS — RESPIRATION RATE: 28 BRPM | HEART RATE: 98 BPM | DIASTOLIC BLOOD PRESSURE: 60 MMHG | SYSTOLIC BLOOD PRESSURE: 90 MMHG

## 2019-01-01 VITALS
BODY MASS INDEX: 19.24 KG/M2 | RESPIRATION RATE: 14 BRPM | HEART RATE: 84 BPM | HEIGHT: 70 IN | OXYGEN SATURATION: 96 % | SYSTOLIC BLOOD PRESSURE: 116 MMHG | TEMPERATURE: 98.5 F | DIASTOLIC BLOOD PRESSURE: 74 MMHG | WEIGHT: 134.4 LBS

## 2019-01-01 VITALS
HEART RATE: 80 BPM | DIASTOLIC BLOOD PRESSURE: 75 MMHG | SYSTOLIC BLOOD PRESSURE: 154 MMHG | WEIGHT: 137 LBS | HEIGHT: 70 IN | TEMPERATURE: 98.4 F | OXYGEN SATURATION: 94 % | RESPIRATION RATE: 18 BRPM | BODY MASS INDEX: 19.61 KG/M2

## 2019-01-01 VITALS
RESPIRATION RATE: 18 BRPM | TEMPERATURE: 97.7 F | HEIGHT: 70 IN | BODY MASS INDEX: 18.47 KG/M2 | WEIGHT: 129 LBS | OXYGEN SATURATION: 95 % | HEART RATE: 80 BPM | SYSTOLIC BLOOD PRESSURE: 105 MMHG | DIASTOLIC BLOOD PRESSURE: 67 MMHG

## 2019-01-01 VITALS
WEIGHT: 132.9 LBS | SYSTOLIC BLOOD PRESSURE: 132 MMHG | RESPIRATION RATE: 18 BRPM | OXYGEN SATURATION: 98 % | DIASTOLIC BLOOD PRESSURE: 68 MMHG | HEIGHT: 70 IN | TEMPERATURE: 97.9 F | BODY MASS INDEX: 19.03 KG/M2 | HEART RATE: 79 BPM

## 2019-01-01 VITALS — DIASTOLIC BLOOD PRESSURE: 60 MMHG | SYSTOLIC BLOOD PRESSURE: 102 MMHG | HEART RATE: 90 BPM | RESPIRATION RATE: 24 BRPM

## 2019-01-01 VITALS
HEART RATE: 66 BPM | BODY MASS INDEX: 19.03 KG/M2 | DIASTOLIC BLOOD PRESSURE: 70 MMHG | SYSTOLIC BLOOD PRESSURE: 123 MMHG | RESPIRATION RATE: 18 BRPM | HEIGHT: 70 IN | WEIGHT: 132.9 LBS | OXYGEN SATURATION: 98 % | TEMPERATURE: 97.1 F

## 2019-01-01 VITALS — RESPIRATION RATE: 20 BRPM | DIASTOLIC BLOOD PRESSURE: 60 MMHG | SYSTOLIC BLOOD PRESSURE: 100 MMHG | HEART RATE: 68 BPM

## 2019-01-01 VITALS
SYSTOLIC BLOOD PRESSURE: 131 MMHG | HEIGHT: 70 IN | DIASTOLIC BLOOD PRESSURE: 70 MMHG | HEART RATE: 84 BPM | RESPIRATION RATE: 18 BRPM | TEMPERATURE: 97.5 F | WEIGHT: 133 LBS | OXYGEN SATURATION: 95 % | BODY MASS INDEX: 19.04 KG/M2

## 2019-01-01 VITALS
HEART RATE: 79 BPM | HEIGHT: 70 IN | OXYGEN SATURATION: 98 % | TEMPERATURE: 97.4 F | BODY MASS INDEX: 19.1 KG/M2 | RESPIRATION RATE: 18 BRPM | SYSTOLIC BLOOD PRESSURE: 154 MMHG | DIASTOLIC BLOOD PRESSURE: 83 MMHG | WEIGHT: 133.4 LBS

## 2019-01-01 VITALS
RESPIRATION RATE: 20 BRPM | SYSTOLIC BLOOD PRESSURE: 94 MMHG | DIASTOLIC BLOOD PRESSURE: 60 MMHG | HEART RATE: 86 BPM | OXYGEN SATURATION: 93 %

## 2019-01-01 VITALS
OXYGEN SATURATION: 88 % | DIASTOLIC BLOOD PRESSURE: 72 MMHG | SYSTOLIC BLOOD PRESSURE: 104 MMHG | HEART RATE: 88 BPM | RESPIRATION RATE: 28 BRPM

## 2019-01-01 VITALS — DIASTOLIC BLOOD PRESSURE: 64 MMHG | HEART RATE: 76 BPM | RESPIRATION RATE: 18 BRPM | SYSTOLIC BLOOD PRESSURE: 120 MMHG

## 2019-01-01 VITALS
SYSTOLIC BLOOD PRESSURE: 100 MMHG | RESPIRATION RATE: 24 BRPM | OXYGEN SATURATION: 86 % | DIASTOLIC BLOOD PRESSURE: 58 MMHG | HEART RATE: 96 BPM

## 2019-01-01 VITALS — HEART RATE: 64 BPM | DIASTOLIC BLOOD PRESSURE: 58 MMHG | SYSTOLIC BLOOD PRESSURE: 98 MMHG | RESPIRATION RATE: 24 BRPM

## 2019-01-01 VITALS
DIASTOLIC BLOOD PRESSURE: 78 MMHG | SYSTOLIC BLOOD PRESSURE: 116 MMHG | HEART RATE: 98 BPM | OXYGEN SATURATION: 84 % | RESPIRATION RATE: 20 BRPM

## 2019-01-01 VITALS
HEART RATE: 98 BPM | RESPIRATION RATE: 26 BRPM | DIASTOLIC BLOOD PRESSURE: 62 MMHG | OXYGEN SATURATION: 93 % | SYSTOLIC BLOOD PRESSURE: 104 MMHG

## 2019-01-01 VITALS
RESPIRATION RATE: 23 BRPM | OXYGEN SATURATION: 92 % | HEART RATE: 72 BPM | DIASTOLIC BLOOD PRESSURE: 62 MMHG | SYSTOLIC BLOOD PRESSURE: 105 MMHG

## 2019-01-01 VITALS
OXYGEN SATURATION: 91 % | DIASTOLIC BLOOD PRESSURE: 58 MMHG | HEART RATE: 59 BPM | SYSTOLIC BLOOD PRESSURE: 110 MMHG | RESPIRATION RATE: 21 BRPM

## 2019-01-01 VITALS
DIASTOLIC BLOOD PRESSURE: 52 MMHG | SYSTOLIC BLOOD PRESSURE: 99 MMHG | HEART RATE: 90 BPM | OXYGEN SATURATION: 91 % | RESPIRATION RATE: 20 BRPM

## 2019-01-01 VITALS
HEIGHT: 70 IN | OXYGEN SATURATION: 97 % | BODY MASS INDEX: 19.04 KG/M2 | DIASTOLIC BLOOD PRESSURE: 54 MMHG | WEIGHT: 133 LBS | HEART RATE: 67 BPM | SYSTOLIC BLOOD PRESSURE: 107 MMHG | RESPIRATION RATE: 16 BRPM | TEMPERATURE: 97 F

## 2019-01-01 VITALS
WEIGHT: 134 LBS | RESPIRATION RATE: 16 BRPM | BODY MASS INDEX: 19.18 KG/M2 | HEART RATE: 54 BPM | OXYGEN SATURATION: 96 % | DIASTOLIC BLOOD PRESSURE: 79 MMHG | HEIGHT: 70 IN | SYSTOLIC BLOOD PRESSURE: 124 MMHG | TEMPERATURE: 98 F

## 2019-01-01 VITALS — SYSTOLIC BLOOD PRESSURE: 118 MMHG | HEART RATE: 84 BPM | DIASTOLIC BLOOD PRESSURE: 64 MMHG | RESPIRATION RATE: 18 BRPM

## 2019-01-01 VITALS
DIASTOLIC BLOOD PRESSURE: 78 MMHG | HEIGHT: 70 IN | RESPIRATION RATE: 18 BRPM | HEART RATE: 87 BPM | BODY MASS INDEX: 19.18 KG/M2 | WEIGHT: 134 LBS | TEMPERATURE: 97.9 F | SYSTOLIC BLOOD PRESSURE: 128 MMHG | OXYGEN SATURATION: 91 %

## 2019-01-01 VITALS
DIASTOLIC BLOOD PRESSURE: 60 MMHG | OXYGEN SATURATION: 97 % | HEART RATE: 94 BPM | SYSTOLIC BLOOD PRESSURE: 100 MMHG | RESPIRATION RATE: 20 BRPM

## 2019-01-01 VITALS — SYSTOLIC BLOOD PRESSURE: 110 MMHG | HEART RATE: 92 BPM | RESPIRATION RATE: 22 BRPM | DIASTOLIC BLOOD PRESSURE: 59 MMHG

## 2019-01-01 VITALS
WEIGHT: 129.9 LBS | BODY MASS INDEX: 18.6 KG/M2 | HEIGHT: 70 IN | OXYGEN SATURATION: 96 % | DIASTOLIC BLOOD PRESSURE: 76 MMHG | TEMPERATURE: 97.6 F | HEART RATE: 69 BPM | SYSTOLIC BLOOD PRESSURE: 126 MMHG | RESPIRATION RATE: 18 BRPM

## 2019-01-01 VITALS
HEART RATE: 96 BPM | OXYGEN SATURATION: 96 % | SYSTOLIC BLOOD PRESSURE: 115 MMHG | DIASTOLIC BLOOD PRESSURE: 70 MMHG | RESPIRATION RATE: 22 BRPM | TEMPERATURE: 98.4 F

## 2019-01-01 VITALS
SYSTOLIC BLOOD PRESSURE: 90 MMHG | OXYGEN SATURATION: 90 % | DIASTOLIC BLOOD PRESSURE: 60 MMHG | RESPIRATION RATE: 28 BRPM | HEART RATE: 98 BPM

## 2019-01-01 VITALS
OXYGEN SATURATION: 92 % | HEART RATE: 90 BPM | DIASTOLIC BLOOD PRESSURE: 60 MMHG | RESPIRATION RATE: 20 BRPM | SYSTOLIC BLOOD PRESSURE: 90 MMHG

## 2019-01-01 VITALS
TEMPERATURE: 97.4 F | SYSTOLIC BLOOD PRESSURE: 138 MMHG | RESPIRATION RATE: 26 BRPM | BODY MASS INDEX: 19.15 KG/M2 | WEIGHT: 133.8 LBS | DIASTOLIC BLOOD PRESSURE: 83 MMHG | HEIGHT: 70 IN | HEART RATE: 93 BPM | OXYGEN SATURATION: 93 %

## 2019-01-01 VITALS
DIASTOLIC BLOOD PRESSURE: 84 MMHG | WEIGHT: 133 LBS | HEIGHT: 70 IN | TEMPERATURE: 97.6 F | OXYGEN SATURATION: 97 % | RESPIRATION RATE: 18 BRPM | SYSTOLIC BLOOD PRESSURE: 122 MMHG | HEART RATE: 85 BPM | BODY MASS INDEX: 19.04 KG/M2

## 2019-01-01 VITALS — HEART RATE: 76 BPM | SYSTOLIC BLOOD PRESSURE: 110 MMHG | RESPIRATION RATE: 18 BRPM | DIASTOLIC BLOOD PRESSURE: 64 MMHG

## 2019-01-01 VITALS
SYSTOLIC BLOOD PRESSURE: 128 MMHG | DIASTOLIC BLOOD PRESSURE: 74 MMHG | OXYGEN SATURATION: 92 % | HEART RATE: 106 BPM | RESPIRATION RATE: 19 BRPM | TEMPERATURE: 99.6 F

## 2019-01-01 VITALS
OXYGEN SATURATION: 91 % | DIASTOLIC BLOOD PRESSURE: 64 MMHG | RESPIRATION RATE: 26 BRPM | HEART RATE: 90 BPM | SYSTOLIC BLOOD PRESSURE: 106 MMHG

## 2019-01-01 VITALS
SYSTOLIC BLOOD PRESSURE: 104 MMHG | HEART RATE: 92 BPM | DIASTOLIC BLOOD PRESSURE: 66 MMHG | RESPIRATION RATE: 22 BRPM | OXYGEN SATURATION: 86 %

## 2019-01-01 VITALS
DIASTOLIC BLOOD PRESSURE: 72 MMHG | OXYGEN SATURATION: 91 % | SYSTOLIC BLOOD PRESSURE: 128 MMHG | HEART RATE: 90 BPM | RESPIRATION RATE: 18 BRPM

## 2019-01-01 VITALS — HEART RATE: 82 BPM | SYSTOLIC BLOOD PRESSURE: 102 MMHG | RESPIRATION RATE: 18 BRPM | DIASTOLIC BLOOD PRESSURE: 62 MMHG

## 2019-01-01 VITALS — SYSTOLIC BLOOD PRESSURE: 110 MMHG | RESPIRATION RATE: 18 BRPM | DIASTOLIC BLOOD PRESSURE: 60 MMHG | HEART RATE: 72 BPM

## 2019-01-01 VITALS — RESPIRATION RATE: 20 BRPM | SYSTOLIC BLOOD PRESSURE: 102 MMHG | DIASTOLIC BLOOD PRESSURE: 64 MMHG | HEART RATE: 76 BPM

## 2019-01-01 DIAGNOSIS — C79.51 LUNG CANCER METASTATIC TO BONE (HCC): ICD-10-CM

## 2019-01-01 DIAGNOSIS — K21.00 GASTROESOPHAGEAL REFLUX DISEASE WITH ESOPHAGITIS: Primary | ICD-10-CM

## 2019-01-01 DIAGNOSIS — R94.8 ABNORMAL BONE SCAN OF LUMBAR SPINE: ICD-10-CM

## 2019-01-01 DIAGNOSIS — C32.9 LARYNGEAL SQUAMOUS CELL CARCINOMA (HCC): Primary | ICD-10-CM

## 2019-01-01 DIAGNOSIS — J96.01 ACUTE RESPIRATORY FAILURE WITH HYPOXIA (HCC): ICD-10-CM

## 2019-01-01 DIAGNOSIS — C32.9 LARYNGEAL SQUAMOUS CELL CARCINOMA (HCC): ICD-10-CM

## 2019-01-01 DIAGNOSIS — C34.90 LUNG CANCER METASTATIC TO BONE (HCC): Primary | ICD-10-CM

## 2019-01-01 DIAGNOSIS — J90 PLEURAL EFFUSION, RIGHT: ICD-10-CM

## 2019-01-01 DIAGNOSIS — E43 SEVERE PROTEIN-CALORIE MALNUTRITION (HCC): ICD-10-CM

## 2019-01-01 DIAGNOSIS — C34.91 LUNG CANCER, PRIMARY, WITH METASTASIS FROM LUNG TO OTHER SITE, RIGHT (HCC): ICD-10-CM

## 2019-01-01 DIAGNOSIS — C79.51 METASTATIC CANCER TO BONE (HCC): ICD-10-CM

## 2019-01-01 DIAGNOSIS — Z71.89 DNR (DO NOT RESUSCITATE) DISCUSSION: ICD-10-CM

## 2019-01-01 DIAGNOSIS — F41.9 ANXIETY DISORDER, UNSPECIFIED TYPE: ICD-10-CM

## 2019-01-01 DIAGNOSIS — Z71.89 GOALS OF CARE, COUNSELING/DISCUSSION: ICD-10-CM

## 2019-01-01 DIAGNOSIS — J90 PLEURAL EFFUSION ON RIGHT: Primary | ICD-10-CM

## 2019-01-01 DIAGNOSIS — R06.00 DYSPNEA, UNSPECIFIED TYPE: ICD-10-CM

## 2019-01-01 DIAGNOSIS — C79.51 LUNG CANCER METASTATIC TO BONE (HCC): Primary | ICD-10-CM

## 2019-01-01 DIAGNOSIS — C79.9 METASTATIC CANCER (HCC): ICD-10-CM

## 2019-01-01 DIAGNOSIS — C34.90 LUNG CANCER METASTATIC TO BONE (HCC): ICD-10-CM

## 2019-01-01 DIAGNOSIS — C79.51 METASTATIC CANCER TO BONE (HCC): Primary | ICD-10-CM

## 2019-01-01 DIAGNOSIS — R06.02 SHORTNESS OF BREATH: ICD-10-CM

## 2019-01-01 DIAGNOSIS — I10 ESSENTIAL HYPERTENSION: ICD-10-CM

## 2019-01-01 DIAGNOSIS — R93.7 ABNORMAL MRI, SPINE: Primary | ICD-10-CM

## 2019-01-01 DIAGNOSIS — R93.89 ABNORMAL CT OF THE CHEST: ICD-10-CM

## 2019-01-01 DIAGNOSIS — Z71.89 ADVANCED CARE PLANNING/COUNSELING DISCUSSION: ICD-10-CM

## 2019-01-01 DIAGNOSIS — F43.9 STRESS: Primary | ICD-10-CM

## 2019-01-01 DIAGNOSIS — R09.02 HYPOXIA: Primary | ICD-10-CM

## 2019-01-01 DIAGNOSIS — E78.00 HYPERCHOLESTEROLEMIA: ICD-10-CM

## 2019-01-01 DIAGNOSIS — R13.14 PHARYNGOESOPHAGEAL DYSPHAGIA: ICD-10-CM

## 2019-01-01 DIAGNOSIS — Z00.00 ENCOUNTER FOR MEDICARE ANNUAL WELLNESS EXAM: Primary | ICD-10-CM

## 2019-01-01 DIAGNOSIS — K12.1 MOUTH ULCER: ICD-10-CM

## 2019-01-01 DIAGNOSIS — F41.9 ANXIETY: ICD-10-CM

## 2019-01-01 DIAGNOSIS — J90 PLEURAL EFFUSION ON RIGHT: ICD-10-CM

## 2019-01-01 LAB
ALBUMIN FLD-MCNC: 2.4 G/DL
ALBUMIN SERPL-MCNC: 2.8 G/DL (ref 3.5–5)
ALBUMIN SERPL-MCNC: 2.9 G/DL (ref 3.5–5)
ALBUMIN SERPL-MCNC: 3 G/DL (ref 3.5–5)
ALBUMIN SERPL-MCNC: 3.1 G/DL (ref 3.5–5)
ALBUMIN SERPL-MCNC: 3.2 G/DL (ref 3.5–5)
ALBUMIN SERPL-MCNC: 3.4 G/DL (ref 3.5–5)
ALBUMIN SERPL-MCNC: 3.5 G/DL (ref 3.5–5)
ALBUMIN SERPL-MCNC: 3.5 G/DL (ref 3.5–5)
ALBUMIN SERPL-MCNC: 3.6 G/DL (ref 3.5–5)
ALBUMIN SERPL-MCNC: 4.4 G/DL (ref 3.5–4.8)
ALBUMIN/GLOB SERPL: 0.6 {RATIO} (ref 1.1–2.2)
ALBUMIN/GLOB SERPL: 0.7 {RATIO} (ref 1.1–2.2)
ALBUMIN/GLOB SERPL: 0.8 {RATIO} (ref 1.1–2.2)
ALBUMIN/GLOB SERPL: 0.8 {RATIO} (ref 1.1–2.2)
ALBUMIN/GLOB SERPL: 0.9 {RATIO} (ref 1.1–2.2)
ALBUMIN/GLOB SERPL: 1.5 {RATIO} (ref 1.2–2.2)
ALP SERPL-CCNC: 64 U/L (ref 45–117)
ALP SERPL-CCNC: 66 U/L (ref 45–117)
ALP SERPL-CCNC: 68 IU/L (ref 39–117)
ALP SERPL-CCNC: 73 U/L (ref 45–117)
ALP SERPL-CCNC: 73 U/L (ref 45–117)
ALP SERPL-CCNC: 76 U/L (ref 45–117)
ALP SERPL-CCNC: 76 U/L (ref 45–117)
ALP SERPL-CCNC: 78 U/L (ref 45–117)
ALP SERPL-CCNC: 80 U/L (ref 45–117)
ALT SERPL-CCNC: 13 IU/L (ref 0–44)
ALT SERPL-CCNC: 16 U/L (ref 12–78)
ALT SERPL-CCNC: 17 U/L (ref 12–78)
ALT SERPL-CCNC: 17 U/L (ref 12–78)
ALT SERPL-CCNC: 18 U/L (ref 12–78)
ALT SERPL-CCNC: 19 U/L (ref 12–78)
ALT SERPL-CCNC: 19 U/L (ref 12–78)
ALT SERPL-CCNC: 26 U/L (ref 12–78)
ALT SERPL-CCNC: 37 U/L (ref 12–78)
ANION GAP BLD CALC-SCNC: 18 MMOL/L (ref 10–20)
ANION GAP SERPL CALC-SCNC: 5 MMOL/L (ref 5–15)
ANION GAP SERPL CALC-SCNC: 6 MMOL/L (ref 5–15)
ANION GAP SERPL CALC-SCNC: 7 MMOL/L (ref 5–15)
ANION GAP SERPL CALC-SCNC: 7 MMOL/L (ref 5–15)
ANION GAP SERPL CALC-SCNC: 8 MMOL/L (ref 5–15)
ANION GAP SERPL CALC-SCNC: 9 MMOL/L (ref 5–15)
ANION GAP SERPL CALC-SCNC: 9 MMOL/L (ref 5–15)
APPEARANCE FLD: ABNORMAL
AST SERPL-CCNC: 18 U/L (ref 15–37)
AST SERPL-CCNC: 18 U/L (ref 15–37)
AST SERPL-CCNC: 19 U/L (ref 15–37)
AST SERPL-CCNC: 20 U/L (ref 15–37)
AST SERPL-CCNC: 21 U/L (ref 15–37)
AST SERPL-CCNC: 22 IU/L (ref 0–40)
AST SERPL-CCNC: 22 U/L (ref 15–37)
AST SERPL-CCNC: 23 U/L (ref 15–37)
AST SERPL-CCNC: 41 U/L (ref 15–37)
ATRIAL RATE: 91 BPM
BACTERIA SPEC CULT: NORMAL
BACTERIA SPEC CULT: NORMAL
BASOPHILS # BLD AUTO: 0 X10E3/UL (ref 0–0.2)
BASOPHILS # BLD: 0 K/UL (ref 0–0.1)
BASOPHILS # BLD: 0.1 K/UL (ref 0–0.1)
BASOPHILS # BLD: 0.1 K/UL (ref 0–0.1)
BASOPHILS NFR BLD AUTO: 0 %
BASOPHILS NFR BLD: 0 % (ref 0–1)
BASOPHILS NFR BLD: 1 % (ref 0–1)
BASOPHILS NFR BLD: 1 % (ref 0–1)
BILIRUB DIRECT SERPL-MCNC: 0.1 MG/DL (ref 0–0.2)
BILIRUB SERPL-MCNC: 0.2 MG/DL (ref 0.2–1)
BILIRUB SERPL-MCNC: 0.3 MG/DL (ref 0.2–1)
BILIRUB SERPL-MCNC: 0.3 MG/DL (ref 0–1.2)
BILIRUB SERPL-MCNC: 0.4 MG/DL (ref 0.2–1)
BNP SERPL-MCNC: 288 PG/ML
BODY FLD TYPE: NORMAL
BUN BLD-MCNC: 9 MG/DL (ref 9–20)
BUN SERPL-MCNC: 10 MG/DL (ref 6–20)
BUN SERPL-MCNC: 11 MG/DL (ref 6–20)
BUN SERPL-MCNC: 11 MG/DL (ref 6–20)
BUN SERPL-MCNC: 14 MG/DL (ref 6–20)
BUN SERPL-MCNC: 14 MG/DL (ref 8–27)
BUN SERPL-MCNC: 16 MG/DL (ref 6–20)
BUN SERPL-MCNC: 9 MG/DL (ref 6–20)
BUN/CREAT SERPL: 15 (ref 12–20)
BUN/CREAT SERPL: 17 (ref 12–20)
BUN/CREAT SERPL: 18 (ref 12–20)
BUN/CREAT SERPL: 19 (ref 10–24)
BUN/CREAT SERPL: 19 (ref 12–20)
BUN/CREAT SERPL: 20 (ref 12–20)
BUN/CREAT SERPL: 21 (ref 12–20)
BUN/CREAT SERPL: 22 (ref 12–20)
CA-I BLD-MCNC: 1.11 MMOL/L (ref 1.12–1.32)
CALCIUM SERPL-MCNC: 8 MG/DL (ref 8.5–10.1)
CALCIUM SERPL-MCNC: 8.1 MG/DL (ref 8.5–10.1)
CALCIUM SERPL-MCNC: 8.2 MG/DL (ref 8.5–10.1)
CALCIUM SERPL-MCNC: 8.3 MG/DL (ref 8.5–10.1)
CALCIUM SERPL-MCNC: 8.5 MG/DL (ref 8.5–10.1)
CALCIUM SERPL-MCNC: 8.5 MG/DL (ref 8.5–10.1)
CALCIUM SERPL-MCNC: 8.9 MG/DL (ref 8.5–10.1)
CALCIUM SERPL-MCNC: 8.9 MG/DL (ref 8.5–10.1)
CALCIUM SERPL-MCNC: 9.3 MG/DL (ref 8.5–10.1)
CALCIUM SERPL-MCNC: 9.5 MG/DL (ref 8.6–10.2)
CALCULATED P AXIS, ECG09: 31 DEGREES
CALCULATED R AXIS, ECG10: 67 DEGREES
CALCULATED T AXIS, ECG11: 57 DEGREES
CHLORIDE BLD-SCNC: 99 MMOL/L (ref 98–107)
CHLORIDE SERPL-SCNC: 101 MMOL/L (ref 97–108)
CHLORIDE SERPL-SCNC: 102 MMOL/L (ref 97–108)
CHLORIDE SERPL-SCNC: 103 MMOL/L (ref 97–108)
CHLORIDE SERPL-SCNC: 98 MMOL/L (ref 96–106)
CHLORIDE SERPL-SCNC: 98 MMOL/L (ref 97–108)
CHOLEST SERPL-MCNC: 153 MG/DL (ref 100–199)
CK SERPL-CCNC: 39 U/L (ref 39–308)
CO2 BLD-SCNC: 20 MMOL/L (ref 21–32)
CO2 SERPL-SCNC: 21 MMOL/L (ref 21–32)
CO2 SERPL-SCNC: 22 MMOL/L (ref 20–29)
CO2 SERPL-SCNC: 22 MMOL/L (ref 21–32)
CO2 SERPL-SCNC: 23 MMOL/L (ref 21–32)
CO2 SERPL-SCNC: 23 MMOL/L (ref 21–32)
CO2 SERPL-SCNC: 24 MMOL/L (ref 21–32)
CO2 SERPL-SCNC: 25 MMOL/L (ref 21–32)
CO2 SERPL-SCNC: 26 MMOL/L (ref 21–32)
COLOR FLD: ABNORMAL
COMMENT, HOLDF: NORMAL
CREAT BLD-MCNC: 0.6 MG/DL (ref 0.6–1.3)
CREAT SERPL-MCNC: 0.58 MG/DL (ref 0.7–1.3)
CREAT SERPL-MCNC: 0.59 MG/DL (ref 0.7–1.3)
CREAT SERPL-MCNC: 0.64 MG/DL (ref 0.7–1.3)
CREAT SERPL-MCNC: 0.71 MG/DL (ref 0.7–1.3)
CREAT SERPL-MCNC: 0.71 MG/DL (ref 0.7–1.3)
CREAT SERPL-MCNC: 0.74 MG/DL (ref 0.7–1.3)
CREAT SERPL-MCNC: 0.74 MG/DL (ref 0.7–1.3)
CREAT SERPL-MCNC: 0.75 MG/DL (ref 0.76–1.27)
CREAT SERPL-MCNC: 0.75 MG/DL (ref 0.7–1.3)
CREAT SERPL-MCNC: 0.78 MG/DL (ref 0.7–1.3)
DIAGNOSIS, 93000: NORMAL
DIFFERENTIAL METHOD BLD: ABNORMAL
EOSINOPHIL # BLD AUTO: 0.3 X10E3/UL (ref 0–0.4)
EOSINOPHIL # BLD: 0 K/UL (ref 0–0.4)
EOSINOPHIL # BLD: 0.3 K/UL (ref 0–0.4)
EOSINOPHIL # BLD: 0.5 K/UL (ref 0–0.4)
EOSINOPHIL # BLD: 0.7 K/UL (ref 0–0.4)
EOSINOPHIL # BLD: 1 K/UL (ref 0–0.4)
EOSINOPHIL NFR BLD AUTO: 4 %
EOSINOPHIL NFR BLD: 0 % (ref 0–7)
EOSINOPHIL NFR BLD: 2 % (ref 0–7)
EOSINOPHIL NFR BLD: 3 % (ref 0–7)
EOSINOPHIL NFR BLD: 4 % (ref 0–7)
EOSINOPHIL NFR BLD: 4 % (ref 0–7)
EOSINOPHIL NFR BLD: 8 % (ref 0–7)
EOSINOPHIL NFR BLD: 9 % (ref 0–7)
EOSINOPHIL NFR FLD MANUAL: 1 %
ERYTHROCYTE [DISTWIDTH] IN BLOOD BY AUTOMATED COUNT: 12.6 % (ref 11.5–14.5)
ERYTHROCYTE [DISTWIDTH] IN BLOOD BY AUTOMATED COUNT: 13.2 % (ref 11.5–14.5)
ERYTHROCYTE [DISTWIDTH] IN BLOOD BY AUTOMATED COUNT: 13.6 % (ref 11.5–14.5)
ERYTHROCYTE [DISTWIDTH] IN BLOOD BY AUTOMATED COUNT: 13.6 % (ref 11.5–14.5)
ERYTHROCYTE [DISTWIDTH] IN BLOOD BY AUTOMATED COUNT: 13.7 % (ref 11.5–14.5)
ERYTHROCYTE [DISTWIDTH] IN BLOOD BY AUTOMATED COUNT: 13.9 % (ref 11.5–14.5)
ERYTHROCYTE [DISTWIDTH] IN BLOOD BY AUTOMATED COUNT: 14.1 % (ref 11.5–14.5)
ERYTHROCYTE [DISTWIDTH] IN BLOOD BY AUTOMATED COUNT: 14.2 % (ref 11.5–14.5)
ERYTHROCYTE [DISTWIDTH] IN BLOOD BY AUTOMATED COUNT: 14.7 % (ref 12.3–15.4)
ERYTHROCYTE [DISTWIDTH] IN BLOOD BY AUTOMATED COUNT: 14.9 % (ref 11.5–14.5)
EST. AVERAGE GLUCOSE BLD GHB EST-MCNC: 114 MG/DL
GLOBULIN SER CALC-MCNC: 2.9 G/DL (ref 1.5–4.5)
GLOBULIN SER CALC-MCNC: 3.9 G/DL (ref 2–4)
GLOBULIN SER CALC-MCNC: 4.1 G/DL (ref 2–4)
GLOBULIN SER CALC-MCNC: 4.2 G/DL (ref 2–4)
GLOBULIN SER CALC-MCNC: 4.3 G/DL (ref 2–4)
GLOBULIN SER CALC-MCNC: 4.3 G/DL (ref 2–4)
GLOBULIN SER CALC-MCNC: 4.5 G/DL (ref 2–4)
GLOBULIN SER CALC-MCNC: 4.7 G/DL (ref 2–4)
GLOBULIN SER CALC-MCNC: 5 G/DL (ref 2–4)
GLUCOSE BLD-MCNC: 174 MG/DL (ref 65–100)
GLUCOSE FLD-MCNC: 88 MG/DL
GLUCOSE SERPL-MCNC: 104 MG/DL (ref 65–100)
GLUCOSE SERPL-MCNC: 112 MG/DL (ref 65–100)
GLUCOSE SERPL-MCNC: 115 MG/DL (ref 65–100)
GLUCOSE SERPL-MCNC: 119 MG/DL (ref 65–100)
GLUCOSE SERPL-MCNC: 125 MG/DL (ref 65–100)
GLUCOSE SERPL-MCNC: 133 MG/DL (ref 65–100)
GLUCOSE SERPL-MCNC: 170 MG/DL (ref 65–100)
GLUCOSE SERPL-MCNC: 177 MG/DL (ref 65–100)
GLUCOSE SERPL-MCNC: 96 MG/DL (ref 65–99)
GLUCOSE SERPL-MCNC: 98 MG/DL (ref 65–100)
GRAM STN SPEC: NORMAL
GRAM STN SPEC: NORMAL
HBA1C MFR BLD: 5.6 % (ref 4.2–6.3)
HCT VFR BLD AUTO: 32 % (ref 36.6–50.3)
HCT VFR BLD AUTO: 32.5 % (ref 36.6–50.3)
HCT VFR BLD AUTO: 34.5 % (ref 36.6–50.3)
HCT VFR BLD AUTO: 36.4 % (ref 36.6–50.3)
HCT VFR BLD AUTO: 36.8 % (ref 36.6–50.3)
HCT VFR BLD AUTO: 37.2 % (ref 36.6–50.3)
HCT VFR BLD AUTO: 37.4 % (ref 37.5–51)
HCT VFR BLD AUTO: 38.8 % (ref 36.6–50.3)
HCT VFR BLD AUTO: 39.3 % (ref 36.6–50.3)
HCT VFR BLD AUTO: 40 % (ref 36.6–50.3)
HCT VFR BLD CALC: 39 % (ref 36.6–50.3)
HDLC SERPL-MCNC: 39 MG/DL
HGB BLD-MCNC: 10.9 G/DL (ref 12.1–17)
HGB BLD-MCNC: 11.2 G/DL (ref 12.1–17)
HGB BLD-MCNC: 11.9 G/DL (ref 12.1–17)
HGB BLD-MCNC: 12.3 G/DL (ref 12.1–17)
HGB BLD-MCNC: 12.4 G/DL (ref 12.1–17)
HGB BLD-MCNC: 12.8 G/DL (ref 12.1–17)
HGB BLD-MCNC: 13.1 G/DL (ref 12.1–17)
HGB BLD-MCNC: 13.4 G/DL (ref 13–17.7)
HGB BLD-MCNC: 13.6 G/DL (ref 12.1–17)
HGB BLD-MCNC: 13.6 G/DL (ref 12.1–17)
IMM GRANULOCYTES # BLD AUTO: 0 K/UL (ref 0–0.04)
IMM GRANULOCYTES # BLD AUTO: 0 X10E3/UL (ref 0–0.1)
IMM GRANULOCYTES # BLD AUTO: 0.1 K/UL (ref 0–0.04)
IMM GRANULOCYTES # BLD AUTO: 0.1 K/UL (ref 0–0.04)
IMM GRANULOCYTES NFR BLD AUTO: 0 %
IMM GRANULOCYTES NFR BLD AUTO: 0 % (ref 0–0.5)
IMM GRANULOCYTES NFR BLD AUTO: 1 % (ref 0–0.5)
IMM GRANULOCYTES NFR BLD AUTO: 1 % (ref 0–0.5)
INTERPRETATION, 910389: NORMAL
LDH FLD L TO P-CCNC: 2077 U/L
LDLC SERPL CALC-MCNC: 84 MG/DL (ref 0–99)
LYMPHOCYTES # BLD AUTO: 1.2 X10E3/UL (ref 0.7–3.1)
LYMPHOCYTES # BLD: 0.3 K/UL (ref 0.8–3.5)
LYMPHOCYTES # BLD: 0.4 K/UL (ref 0.8–3.5)
LYMPHOCYTES # BLD: 0.5 K/UL (ref 0.8–3.5)
LYMPHOCYTES # BLD: 0.5 K/UL (ref 0.8–3.5)
LYMPHOCYTES # BLD: 0.6 K/UL (ref 0.8–3.5)
LYMPHOCYTES # BLD: 0.8 K/UL (ref 0.8–3.5)
LYMPHOCYTES # BLD: 1 K/UL (ref 0.8–3.5)
LYMPHOCYTES NFR BLD AUTO: 16 %
LYMPHOCYTES NFR BLD: 14 % (ref 12–49)
LYMPHOCYTES NFR BLD: 2 % (ref 12–49)
LYMPHOCYTES NFR BLD: 2 % (ref 12–49)
LYMPHOCYTES NFR BLD: 3 % (ref 12–49)
LYMPHOCYTES NFR BLD: 3 % (ref 12–49)
LYMPHOCYTES NFR BLD: 5 % (ref 12–49)
LYMPHOCYTES NFR BLD: 6 % (ref 12–49)
LYMPHOCYTES NFR BLD: 7 % (ref 12–49)
LYMPHOCYTES NFR BLD: 9 % (ref 12–49)
LYMPHOCYTES NFR FLD: 8 %
MCH RBC QN AUTO: 28.9 PG (ref 26–34)
MCH RBC QN AUTO: 29.2 PG (ref 26–34)
MCH RBC QN AUTO: 29.4 PG (ref 26–34)
MCH RBC QN AUTO: 29.5 PG (ref 26–34)
MCH RBC QN AUTO: 29.6 PG (ref 26–34)
MCH RBC QN AUTO: 29.9 PG (ref 26–34)
MCH RBC QN AUTO: 30 PG (ref 26–34)
MCH RBC QN AUTO: 30.5 PG (ref 26.6–33)
MCH RBC QN AUTO: 30.6 PG (ref 26–34)
MCH RBC QN AUTO: 31 PG (ref 26–34)
MCHC RBC AUTO-ENTMCNC: 33.5 G/DL (ref 30–36.5)
MCHC RBC AUTO-ENTMCNC: 33.7 G/DL (ref 30–36.5)
MCHC RBC AUTO-ENTMCNC: 33.8 G/DL (ref 30–36.5)
MCHC RBC AUTO-ENTMCNC: 33.8 G/DL (ref 30–36.5)
MCHC RBC AUTO-ENTMCNC: 34 G/DL (ref 30–36.5)
MCHC RBC AUTO-ENTMCNC: 34.4 G/DL (ref 30–36.5)
MCHC RBC AUTO-ENTMCNC: 34.5 G/DL (ref 30–36.5)
MCHC RBC AUTO-ENTMCNC: 34.6 G/DL (ref 30–36.5)
MCHC RBC AUTO-ENTMCNC: 35 G/DL (ref 30–36.5)
MCHC RBC AUTO-ENTMCNC: 35.8 G/DL (ref 31.5–35.7)
MCV RBC AUTO: 85 FL (ref 79–97)
MCV RBC AUTO: 85.6 FL (ref 80–99)
MCV RBC AUTO: 85.6 FL (ref 80–99)
MCV RBC AUTO: 85.8 FL (ref 80–99)
MCV RBC AUTO: 87 FL (ref 80–99)
MCV RBC AUTO: 87.1 FL (ref 80–99)
MCV RBC AUTO: 87.4 FL (ref 80–99)
MCV RBC AUTO: 88.3 FL (ref 80–99)
MCV RBC AUTO: 89 FL (ref 80–99)
MCV RBC AUTO: 89.5 FL (ref 80–99)
MESOTHL CELL NFR FLD: 13 %
MONOCYTES # BLD AUTO: 0.6 X10E3/UL (ref 0.1–0.9)
MONOCYTES # BLD: 0 K/UL (ref 0–1)
MONOCYTES # BLD: 0.2 K/UL (ref 0–1)
MONOCYTES # BLD: 0.5 K/UL (ref 0–1)
MONOCYTES # BLD: 0.5 K/UL (ref 0–1)
MONOCYTES # BLD: 0.6 K/UL (ref 0–1)
MONOCYTES # BLD: 0.7 K/UL (ref 0–1)
MONOCYTES # BLD: 0.9 K/UL (ref 0–1)
MONOCYTES # BLD: 1 K/UL (ref 0–1)
MONOCYTES # BLD: 1 K/UL (ref 0–1)
MONOCYTES NFR BLD AUTO: 8 %
MONOCYTES NFR BLD: 0 % (ref 5–13)
MONOCYTES NFR BLD: 2 % (ref 5–13)
MONOCYTES NFR BLD: 22 % (ref 5–13)
MONOCYTES NFR BLD: 4 % (ref 5–13)
MONOCYTES NFR BLD: 5 % (ref 5–13)
MONOCYTES NFR BLD: 6 % (ref 5–13)
MONOCYTES NFR BLD: 7 % (ref 5–13)
MONOCYTES NFR BLD: 7 % (ref 5–13)
MONOCYTES NFR BLD: 8 % (ref 5–13)
MONOS+MACROS NFR FLD: 7 %
NEUTROPHILS # BLD AUTO: 5.5 X10E3/UL (ref 1.4–7)
NEUTROPHILS NFR BLD AUTO: 72 %
NEUTROPHILS NFR FLD: 71 %
NEUTS SEG # BLD: 10.2 K/UL (ref 1.8–8)
NEUTS SEG # BLD: 11 K/UL (ref 1.8–8)
NEUTS SEG # BLD: 11 K/UL (ref 1.8–8)
NEUTS SEG # BLD: 11.9 K/UL (ref 1.8–8)
NEUTS SEG # BLD: 13.2 K/UL (ref 1.8–8)
NEUTS SEG # BLD: 2.8 K/UL (ref 1.8–8)
NEUTS SEG # BLD: 27.9 K/UL (ref 1.8–8)
NEUTS SEG # BLD: 5.8 K/UL (ref 1.8–8)
NEUTS SEG # BLD: 8.8 K/UL (ref 1.8–8)
NEUTS SEG NFR BLD: 64 % (ref 32–75)
NEUTS SEG NFR BLD: 76 % (ref 32–75)
NEUTS SEG NFR BLD: 77 % (ref 32–75)
NEUTS SEG NFR BLD: 80 % (ref 32–75)
NEUTS SEG NFR BLD: 85 % (ref 32–75)
NEUTS SEG NFR BLD: 88 % (ref 32–75)
NEUTS SEG NFR BLD: 90 % (ref 32–75)
NEUTS SEG NFR BLD: 96 % (ref 32–75)
NEUTS SEG NFR BLD: 98 % (ref 32–75)
NRBC # BLD: 0 K/UL (ref 0–0.01)
NRBC BLD-RTO: 0 PER 100 WBC
NUC CELL # FLD: 2164 /CU MM
P-R INTERVAL, ECG05: 104 MS
PH FLD: 7.2 [PH]
PHOSPHATE SERPL-MCNC: 2.1 MG/DL (ref 2.6–4.7)
PLATELET # BLD AUTO: 240 K/UL (ref 150–400)
PLATELET # BLD AUTO: 243 K/UL (ref 150–400)
PLATELET # BLD AUTO: 282 K/UL (ref 150–400)
PLATELET # BLD AUTO: 283 K/UL (ref 150–400)
PLATELET # BLD AUTO: 294 K/UL (ref 150–400)
PLATELET # BLD AUTO: 295 K/UL (ref 150–400)
PLATELET # BLD AUTO: 295 X10E3/UL (ref 150–379)
PLATELET # BLD AUTO: 379 K/UL (ref 150–400)
PLATELET # BLD AUTO: 382 K/UL (ref 150–400)
PLATELET # BLD AUTO: 400 K/UL (ref 150–400)
PMV BLD AUTO: 10 FL (ref 8.9–12.9)
PMV BLD AUTO: 10.1 FL (ref 8.9–12.9)
PMV BLD AUTO: 9.2 FL (ref 8.9–12.9)
PMV BLD AUTO: 9.3 FL (ref 8.9–12.9)
PMV BLD AUTO: 9.4 FL (ref 8.9–12.9)
PMV BLD AUTO: 9.5 FL (ref 8.9–12.9)
PMV BLD AUTO: 9.5 FL (ref 8.9–12.9)
PMV BLD AUTO: 9.6 FL (ref 8.9–12.9)
PMV BLD AUTO: 9.6 FL (ref 8.9–12.9)
POTASSIUM BLD-SCNC: 3.8 MMOL/L (ref 3.5–5.1)
POTASSIUM SERPL-SCNC: 3.4 MMOL/L (ref 3.5–5.1)
POTASSIUM SERPL-SCNC: 3.6 MMOL/L (ref 3.5–5.1)
POTASSIUM SERPL-SCNC: 3.7 MMOL/L (ref 3.5–5.1)
POTASSIUM SERPL-SCNC: 3.8 MMOL/L (ref 3.5–5.1)
POTASSIUM SERPL-SCNC: 3.9 MMOL/L (ref 3.5–5.1)
POTASSIUM SERPL-SCNC: 4 MMOL/L (ref 3.5–5.2)
POTASSIUM SERPL-SCNC: 4.1 MMOL/L (ref 3.5–5.1)
POTASSIUM SERPL-SCNC: 4.2 MMOL/L (ref 3.5–5.1)
PROT FLD-MCNC: 4.6 G/DL
PROT SERPL-MCNC: 7 G/DL (ref 6.4–8.2)
PROT SERPL-MCNC: 7 G/DL (ref 6.4–8.2)
PROT SERPL-MCNC: 7.3 G/DL (ref 6–8.5)
PROT SERPL-MCNC: 7.4 G/DL (ref 6.4–8.2)
PROT SERPL-MCNC: 7.5 G/DL (ref 6.4–8.2)
PROT SERPL-MCNC: 7.7 G/DL (ref 6.4–8.2)
PROT SERPL-MCNC: 7.9 G/DL (ref 6.4–8.2)
PROT SERPL-MCNC: 7.9 G/DL (ref 6.4–8.2)
PROT SERPL-MCNC: 8.5 G/DL (ref 6.4–8.2)
Q-T INTERVAL, ECG07: 358 MS
QRS DURATION, ECG06: 86 MS
QTC CALCULATION (BEZET), ECG08: 440 MS
RBC # BLD AUTO: 3.73 M/UL (ref 4.1–5.7)
RBC # BLD AUTO: 3.74 M/UL (ref 4.1–5.7)
RBC # BLD AUTO: 4.02 M/UL (ref 4.1–5.7)
RBC # BLD AUTO: 4.18 M/UL (ref 4.1–5.7)
RBC # BLD AUTO: 4.21 M/UL (ref 4.1–5.7)
RBC # BLD AUTO: 4.25 M/UL (ref 4.1–5.7)
RBC # BLD AUTO: 4.39 M/UL (ref 4.1–5.7)
RBC # BLD AUTO: 4.4 X10E6/UL (ref 4.14–5.8)
RBC # BLD AUTO: 4.46 M/UL (ref 4.1–5.7)
RBC # BLD AUTO: 4.53 M/UL (ref 4.1–5.7)
RBC # FLD: >100 /CU MM
RBC MORPH BLD: ABNORMAL
SAMPLES BEING HELD,HOLD: NORMAL
SERVICE CMNT-IMP: ABNORMAL
SERVICE CMNT-IMP: NORMAL
SODIUM BLD-SCNC: 133 MMOL/L (ref 136–145)
SODIUM SERPL-SCNC: 130 MMOL/L (ref 136–145)
SODIUM SERPL-SCNC: 131 MMOL/L (ref 136–145)
SODIUM SERPL-SCNC: 132 MMOL/L (ref 136–145)
SODIUM SERPL-SCNC: 133 MMOL/L (ref 136–145)
SODIUM SERPL-SCNC: 133 MMOL/L (ref 136–145)
SODIUM SERPL-SCNC: 134 MMOL/L (ref 136–145)
SODIUM SERPL-SCNC: 135 MMOL/L (ref 136–145)
SODIUM SERPL-SCNC: 137 MMOL/L (ref 134–144)
SPECIMEN SOURCE FLD: ABNORMAL
SPECIMEN SOURCE FLD: NORMAL
TRIGL SERPL-MCNC: 148 MG/DL (ref 0–149)
TROPONIN I SERPL-MCNC: <0.05 NG/ML
TROPONIN I SERPL-MCNC: <0.05 NG/ML
TSH SERPL DL<=0.05 MIU/L-ACNC: 2.79 UIU/ML (ref 0.36–3.74)
TSH SERPL DL<=0.05 MIU/L-ACNC: 3.35 UIU/ML (ref 0.36–3.74)
TSH SERPL DL<=0.05 MIU/L-ACNC: 8.85 UIU/ML (ref 0.36–3.74)
VENTRICULAR RATE, ECG03: 91 BPM
VLDLC SERPL CALC-MCNC: 30 MG/DL (ref 5–40)
WBC # BLD AUTO: 11 K/UL (ref 4.1–11.1)
WBC # BLD AUTO: 11.5 K/UL (ref 4.1–11.1)
WBC # BLD AUTO: 12.8 K/UL (ref 4.1–11.1)
WBC # BLD AUTO: 13.1 K/UL (ref 4.1–11.1)
WBC # BLD AUTO: 13.2 K/UL (ref 4.1–11.1)
WBC # BLD AUTO: 15.1 K/UL (ref 4.1–11.1)
WBC # BLD AUTO: 28.5 K/UL (ref 4.1–11.1)
WBC # BLD AUTO: 4.4 K/UL (ref 4.1–11.1)
WBC # BLD AUTO: 7.6 K/UL (ref 4.1–11.1)
WBC # BLD AUTO: 7.7 X10E3/UL (ref 3.4–10.8)

## 2019-01-01 PROCEDURE — 0651 HSPC ROUTINE HOME CARE

## 2019-01-01 PROCEDURE — 94640 AIRWAY INHALATION TREATMENT: CPT

## 2019-01-01 PROCEDURE — 36415 COLL VENOUS BLD VENIPUNCTURE: CPT

## 2019-01-01 PROCEDURE — 99283 EMERGENCY DEPT VISIT LOW MDM: CPT

## 2019-01-01 PROCEDURE — 74011636320 HC RX REV CODE- 636/320: Performed by: INTERNAL MEDICINE

## 2019-01-01 PROCEDURE — 83036 HEMOGLOBIN GLYCOSYLATED A1C: CPT

## 2019-01-01 PROCEDURE — HOSPICE MEDICATION HC HH HOSPICE MEDICATION

## 2019-01-01 PROCEDURE — 77010033678 HC OXYGEN DAILY

## 2019-01-01 PROCEDURE — G0299 HHS/HOSPICE OF RN EA 15 MIN: HCPCS

## 2019-01-01 PROCEDURE — 65660000000 HC RM CCU STEPDOWN

## 2019-01-01 PROCEDURE — 77030012965 HC NDL HUBR BBMI -A

## 2019-01-01 PROCEDURE — 85025 COMPLETE CBC W/AUTO DIFF WBC: CPT

## 2019-01-01 PROCEDURE — 80053 COMPREHEN METABOLIC PANEL: CPT

## 2019-01-01 PROCEDURE — 94760 N-INVAS EAR/PLS OXIMETRY 1: CPT

## 2019-01-01 PROCEDURE — 71260 CT THORAX DX C+: CPT

## 2019-01-01 PROCEDURE — 96417 CHEMO IV INFUS EACH ADDL SEQ: CPT

## 2019-01-01 PROCEDURE — 84443 ASSAY THYROID STIM HORMONE: CPT

## 2019-01-01 PROCEDURE — 89050 BODY FLUID CELL COUNT: CPT

## 2019-01-01 PROCEDURE — 20220 BONE BIOPSY TROCAR/NDL SUPFC: CPT

## 2019-01-01 PROCEDURE — 77030018781

## 2019-01-01 PROCEDURE — 77030028872 HC BN BIOP NDL ON CNTRL TY TELE -C

## 2019-01-01 PROCEDURE — 74011000258 HC RX REV CODE- 258: Performed by: INTERNAL MEDICINE

## 2019-01-01 PROCEDURE — 96413 CHEMO IV INFUSION 1 HR: CPT

## 2019-01-01 PROCEDURE — 83880 ASSAY OF NATRIURETIC PEPTIDE: CPT

## 2019-01-01 PROCEDURE — 77030039266 HC ADH SKN EXOFIN S2SG -A

## 2019-01-01 PROCEDURE — 96374 THER/PROPH/DIAG INJ IV PUSH: CPT

## 2019-01-01 PROCEDURE — 72197 MRI PELVIS W/O & W/DYE: CPT

## 2019-01-01 PROCEDURE — 96411 CHEMO IV PUSH ADDL DRUG: CPT

## 2019-01-01 PROCEDURE — T4541 LARGE DISPOSABLE UNDERPAD: HCPCS

## 2019-01-01 PROCEDURE — 94761 N-INVAS EAR/PLS OXIMETRY MLT: CPT

## 2019-01-01 PROCEDURE — 74011250636 HC RX REV CODE- 250/636

## 2019-01-01 PROCEDURE — 74160 CT ABDOMEN W/CONTRAST: CPT

## 2019-01-01 PROCEDURE — G0156 HHCP-SVS OF AIDE,EA 15 MIN: HCPCS

## 2019-01-01 PROCEDURE — 32550 INSERT PLEURAL CATH: CPT

## 2019-01-01 PROCEDURE — 84484 ASSAY OF TROPONIN QUANT: CPT

## 2019-01-01 PROCEDURE — 80047 BASIC METABLC PNL IONIZED CA: CPT

## 2019-01-01 PROCEDURE — 99284 EMERGENCY DEPT VISIT MOD MDM: CPT

## 2019-01-01 PROCEDURE — 74011250636 HC RX REV CODE- 250/636: Performed by: RADIOLOGY

## 2019-01-01 PROCEDURE — 74011250636 HC RX REV CODE- 250/636: Performed by: NURSE PRACTITIONER

## 2019-01-01 PROCEDURE — 80061 LIPID PANEL: CPT

## 2019-01-01 PROCEDURE — 74011000250 HC RX REV CODE- 250: Performed by: INTERNAL MEDICINE

## 2019-01-01 PROCEDURE — 88305 TISSUE EXAM BY PATHOLOGIST: CPT

## 2019-01-01 PROCEDURE — 94762 N-INVAS EAR/PLS OXIMTRY CONT: CPT

## 2019-01-01 PROCEDURE — C1894 INTRO/SHEATH, NON-LASER: HCPCS

## 2019-01-01 PROCEDURE — C1729 CATH, DRAINAGE: HCPCS

## 2019-01-01 PROCEDURE — 77030031139 HC SUT VCRL2 J&J -A

## 2019-01-01 PROCEDURE — 74011250636 HC RX REV CODE- 250/636: Performed by: INTERNAL MEDICINE

## 2019-01-01 PROCEDURE — 80076 HEPATIC FUNCTION PANEL: CPT

## 2019-01-01 PROCEDURE — C1892 INTRO/SHEATH,FIXED,PEEL-AWAY: HCPCS

## 2019-01-01 PROCEDURE — 77012 CT SCAN FOR NEEDLE BIOPSY: CPT

## 2019-01-01 PROCEDURE — 77030029684 HC NEB SM VOL KT MONA -A

## 2019-01-01 PROCEDURE — 82550 ASSAY OF CK (CPK): CPT

## 2019-01-01 PROCEDURE — HOSPICE MEDICATION 0636 HC HH HOSPICE MEDICATION

## 2019-01-01 PROCEDURE — A4212 NON CORING NEEDLE OR STYLET: HCPCS

## 2019-01-01 PROCEDURE — 78306 BONE IMAGING WHOLE BODY: CPT

## 2019-01-01 PROCEDURE — 74011000250 HC RX REV CODE- 250: Performed by: RADIOLOGY

## 2019-01-01 PROCEDURE — 74011250637 HC RX REV CODE- 250/637: Performed by: FAMILY MEDICINE

## 2019-01-01 PROCEDURE — A4223 INFUSION SUPPLIES W/O PUMP: HCPCS

## 2019-01-01 PROCEDURE — 88307 TISSUE EXAM BY PATHOLOGIST: CPT

## 2019-01-01 PROCEDURE — 88341 IMHCHEM/IMCYTCHM EA ADD ANTB: CPT

## 2019-01-01 PROCEDURE — 84157 ASSAY OF PROTEIN OTHER: CPT

## 2019-01-01 PROCEDURE — 96375 TX/PRO/DX INJ NEW DRUG ADDON: CPT

## 2019-01-01 PROCEDURE — 77030014115

## 2019-01-01 PROCEDURE — 0W9930Z DRAINAGE OF RIGHT PLEURAL CAVITY WITH DRAINAGE DEVICE, PERCUTANEOUS APPROACH: ICD-10-PCS | Performed by: RADIOLOGY

## 2019-01-01 PROCEDURE — 71046 X-RAY EXAM CHEST 2 VIEWS: CPT

## 2019-01-01 PROCEDURE — 74011250637 HC RX REV CODE- 250/637: Performed by: INTERNAL MEDICINE

## 2019-01-01 PROCEDURE — 83615 LACTATE (LD) (LDH) ENZYME: CPT

## 2019-01-01 PROCEDURE — A9575 INJ GADOTERATE MEGLUMI 0.1ML: HCPCS | Performed by: INTERNAL MEDICINE

## 2019-01-01 PROCEDURE — 74177 CT ABD & PELVIS W/CONTRAST: CPT

## 2019-01-01 PROCEDURE — 72158 MRI LUMBAR SPINE W/O & W/DYE: CPT

## 2019-01-01 PROCEDURE — 80069 RENAL FUNCTION PANEL: CPT

## 2019-01-01 PROCEDURE — C1788 PORT, INDWELLING, IMP: HCPCS

## 2019-01-01 PROCEDURE — 82042 OTHER SOURCE ALBUMIN QUAN EA: CPT

## 2019-01-01 PROCEDURE — A4221 SUPP NON-INSULIN INF CATH/WK: HCPCS

## 2019-01-01 PROCEDURE — 80048 BASIC METABOLIC PNL TOTAL CA: CPT

## 2019-01-01 PROCEDURE — 82945 GLUCOSE OTHER FLUID: CPT

## 2019-01-01 PROCEDURE — 88342 IMHCHEM/IMCYTCHM 1ST ANTB: CPT

## 2019-01-01 PROCEDURE — 71275 CT ANGIOGRAPHY CHEST: CPT

## 2019-01-01 PROCEDURE — 77030003375 HC MRK BIOP BEEK -A

## 2019-01-01 PROCEDURE — 3336500001 HSPC ELECTION

## 2019-01-01 PROCEDURE — 74011250636 HC RX REV CODE- 250/636: Performed by: EMERGENCY MEDICINE

## 2019-01-01 PROCEDURE — 77030021164 US THORACENTESIS NDL PUNC ASP W IMAGE

## 2019-01-01 PROCEDURE — 76450000000

## 2019-01-01 PROCEDURE — 83986 ASSAY PH BODY FLUID NOS: CPT

## 2019-01-01 PROCEDURE — 88333 PATH CONSLTJ SURG CYTO XM 1: CPT

## 2019-01-01 PROCEDURE — 88311 DECALCIFY TISSUE: CPT

## 2019-01-01 PROCEDURE — 36561 INSERT TUNNELED CV CATH: CPT

## 2019-01-01 PROCEDURE — A4927 NON-STERILE GLOVES: HCPCS

## 2019-01-01 PROCEDURE — 93005 ELECTROCARDIOGRAM TRACING: CPT

## 2019-01-01 PROCEDURE — 74011000250 HC RX REV CODE- 250: Performed by: EMERGENCY MEDICINE

## 2019-01-01 PROCEDURE — 96367 TX/PROPH/DG ADDL SEQ IV INF: CPT

## 2019-01-01 PROCEDURE — 71045 X-RAY EXAM CHEST 1 VIEW: CPT

## 2019-01-01 PROCEDURE — E0325 URINAL MALE JUG-TYPE: HCPCS

## 2019-01-01 PROCEDURE — 88112 CYTOPATH CELL ENHANCE TECH: CPT

## 2019-01-01 PROCEDURE — 87205 SMEAR GRAM STAIN: CPT

## 2019-01-01 PROCEDURE — 96372 THER/PROPH/DIAG INJ SC/IM: CPT

## 2019-01-01 RX ORDER — LEVOTHYROXINE SODIUM 50 UG/1
50 TABLET ORAL
Status: DISCONTINUED | OUTPATIENT
Start: 2019-01-01 | End: 2019-01-01 | Stop reason: HOSPADM

## 2019-01-01 RX ORDER — EPINEPHRINE 1 MG/ML
0.3 INJECTION, SOLUTION, CONCENTRATE INTRAVENOUS AS NEEDED
Status: CANCELLED | OUTPATIENT
Start: 2019-01-01

## 2019-01-01 RX ORDER — HEPARIN 100 UNIT/ML
300-500 SYRINGE INTRAVENOUS AS NEEDED
Status: CANCELLED
Start: 2019-01-01

## 2019-01-01 RX ORDER — DIPHENHYDRAMINE HYDROCHLORIDE 50 MG/ML
50 INJECTION, SOLUTION INTRAMUSCULAR; INTRAVENOUS AS NEEDED
Status: CANCELLED
Start: 2019-01-01

## 2019-01-01 RX ORDER — IPRATROPIUM BROMIDE AND ALBUTEROL SULFATE 2.5; .5 MG/3ML; MG/3ML
3 SOLUTION RESPIRATORY (INHALATION)
Status: DISCONTINUED | OUTPATIENT
Start: 2019-01-01 | End: 2019-01-01

## 2019-01-01 RX ORDER — SODIUM CHLORIDE 0.9 % (FLUSH) 0.9 %
10 SYRINGE (ML) INJECTION AS NEEDED
Status: ACTIVE | OUTPATIENT
Start: 2019-01-01 | End: 2019-01-01

## 2019-01-01 RX ORDER — SODIUM CHLORIDE 9 MG/ML
10 INJECTION INTRAMUSCULAR; INTRAVENOUS; SUBCUTANEOUS AS NEEDED
Status: CANCELLED | OUTPATIENT
Start: 2019-01-01

## 2019-01-01 RX ORDER — SODIUM CHLORIDE 0.9 % (FLUSH) 0.9 %
10 SYRINGE (ML) INJECTION AS NEEDED
Status: CANCELLED
Start: 2019-01-01

## 2019-01-01 RX ORDER — ALBUTEROL SULFATE 0.83 MG/ML
2.5 SOLUTION RESPIRATORY (INHALATION) AS NEEDED
Status: CANCELLED
Start: 2019-01-01

## 2019-01-01 RX ORDER — ACETAMINOPHEN 325 MG/1
650 TABLET ORAL AS NEEDED
Status: CANCELLED
Start: 2019-01-01

## 2019-01-01 RX ORDER — SODIUM CHLORIDE 9 MG/ML
25 INJECTION, SOLUTION INTRAVENOUS CONTINUOUS
Status: CANCELLED | OUTPATIENT
Start: 2019-01-01 | End: 2019-01-01

## 2019-01-01 RX ORDER — SODIUM CHLORIDE 9 MG/ML
25 INJECTION, SOLUTION INTRAVENOUS CONTINUOUS
Status: DISPENSED | OUTPATIENT
Start: 2019-01-01 | End: 2019-01-01

## 2019-01-01 RX ORDER — HYDROCORTISONE SODIUM SUCCINATE 100 MG/2ML
100 INJECTION, POWDER, FOR SOLUTION INTRAMUSCULAR; INTRAVENOUS AS NEEDED
Status: CANCELLED | OUTPATIENT
Start: 2019-01-01

## 2019-01-01 RX ORDER — SODIUM CHLORIDE 9 MG/ML
10 INJECTION INTRAMUSCULAR; INTRAVENOUS; SUBCUTANEOUS AS NEEDED
Status: DISPENSED | OUTPATIENT
Start: 2019-01-01 | End: 2019-01-01

## 2019-01-01 RX ORDER — ZOLEDRONIC ACID 4 MG/100ML
4 SOLUTION INTRAVENOUS ONCE
Status: COMPLETED | OUTPATIENT
Start: 2019-01-01 | End: 2019-01-01

## 2019-01-01 RX ORDER — CYANOCOBALAMIN 1000 UG/ML
1000 INJECTION, SOLUTION INTRAMUSCULAR; SUBCUTANEOUS
Status: COMPLETED | OUTPATIENT
Start: 2019-01-01 | End: 2019-01-01

## 2019-01-01 RX ORDER — CEFAZOLIN SODIUM/WATER 2 G/20 ML
2 SYRINGE (ML) INTRAVENOUS ONCE
Status: COMPLETED | OUTPATIENT
Start: 2019-01-01 | End: 2019-01-01

## 2019-01-01 RX ORDER — SODIUM CHLORIDE 0.9 % (FLUSH) 0.9 %
10 SYRINGE (ML) INJECTION
Status: COMPLETED | OUTPATIENT
Start: 2019-01-01 | End: 2019-01-01

## 2019-01-01 RX ORDER — ONDANSETRON 4 MG/1
4 TABLET, ORALLY DISINTEGRATING ORAL
Qty: 40 TAB | Refills: 1 | Status: SHIPPED | OUTPATIENT
Start: 2019-01-01

## 2019-01-01 RX ORDER — FLUTICASONE PROPIONATE 50 MCG
1 SPRAY, SUSPENSION (ML) NASAL DAILY
Status: DISCONTINUED | OUTPATIENT
Start: 2019-01-01 | End: 2019-01-01 | Stop reason: HOSPADM

## 2019-01-01 RX ORDER — ONDANSETRON 2 MG/ML
8 INJECTION INTRAMUSCULAR; INTRAVENOUS AS NEEDED
Status: CANCELLED | OUTPATIENT
Start: 2019-01-01

## 2019-01-01 RX ORDER — ONDANSETRON 2 MG/ML
8 INJECTION INTRAMUSCULAR; INTRAVENOUS ONCE
Status: COMPLETED | OUTPATIENT
Start: 2019-01-01 | End: 2019-01-01

## 2019-01-01 RX ORDER — PANTOPRAZOLE SODIUM 40 MG/1
40 TABLET, DELAYED RELEASE ORAL DAILY
Status: DISCONTINUED | OUTPATIENT
Start: 2019-01-01 | End: 2019-01-01 | Stop reason: HOSPADM

## 2019-01-01 RX ORDER — HEPARIN 100 UNIT/ML
300-500 SYRINGE INTRAVENOUS AS NEEDED
Status: ACTIVE | OUTPATIENT
Start: 2019-01-01 | End: 2019-01-01

## 2019-01-01 RX ORDER — CYANOCOBALAMIN 1000 UG/ML
1000 INJECTION, SOLUTION INTRAMUSCULAR; SUBCUTANEOUS
Status: CANCELLED | OUTPATIENT
Start: 2019-01-01

## 2019-01-01 RX ORDER — METOCLOPRAMIDE 5 MG/1
5 TABLET ORAL 4 TIMES DAILY
Qty: 40 TAB | Refills: 0 | Status: SHIPPED | OUTPATIENT
Start: 2019-01-01 | End: 2019-01-01

## 2019-01-01 RX ORDER — PRAVASTATIN SODIUM 40 MG/1
TABLET ORAL
Qty: 90 TAB | Refills: 3 | Status: SHIPPED | OUTPATIENT
Start: 2019-01-01 | End: 2019-01-01

## 2019-01-01 RX ORDER — PROCHLORPERAZINE MALEATE 10 MG
5 TABLET ORAL
Qty: 60 TAB | Refills: 3 | Status: SHIPPED | OUTPATIENT
Start: 2019-01-01 | End: 2019-01-01

## 2019-01-01 RX ORDER — LIDOCAINE HYDROCHLORIDE 20 MG/ML
20 INJECTION, SOLUTION INFILTRATION; PERINEURAL ONCE
Status: COMPLETED | OUTPATIENT
Start: 2019-01-01 | End: 2019-01-01

## 2019-01-01 RX ORDER — LIDOCAINE 40 MG/G
CREAM TOPICAL AS NEEDED
Status: DISCONTINUED | OUTPATIENT
Start: 2019-01-01 | End: 2019-01-01 | Stop reason: HOSPADM

## 2019-01-01 RX ORDER — ONDANSETRON 2 MG/ML
4 INJECTION INTRAMUSCULAR; INTRAVENOUS
Status: DISCONTINUED | OUTPATIENT
Start: 2019-01-01 | End: 2019-01-01 | Stop reason: SDUPTHER

## 2019-01-01 RX ORDER — FOLIC ACID 1 MG/1
1 TABLET ORAL DAILY
Status: DISCONTINUED | OUTPATIENT
Start: 2019-01-01 | End: 2019-01-01 | Stop reason: HOSPADM

## 2019-01-01 RX ORDER — FOLIC ACID 1 MG/1
1 TABLET ORAL DAILY
Qty: 30 TAB | Refills: 6 | Status: SHIPPED | OUTPATIENT
Start: 2019-01-01

## 2019-01-01 RX ORDER — HEPARIN 100 UNIT/ML
300 SYRINGE INTRAVENOUS ONCE
Status: COMPLETED | OUTPATIENT
Start: 2019-01-01 | End: 2019-01-01

## 2019-01-01 RX ORDER — ONDANSETRON 2 MG/ML
4 INJECTION INTRAMUSCULAR; INTRAVENOUS
Status: DISCONTINUED | OUTPATIENT
Start: 2019-01-01 | End: 2019-01-01 | Stop reason: HOSPADM

## 2019-01-01 RX ORDER — ACETAMINOPHEN 325 MG/1
650 TABLET ORAL
Status: DISCONTINUED | OUTPATIENT
Start: 2019-01-01 | End: 2019-01-01 | Stop reason: SDUPTHER

## 2019-01-01 RX ORDER — GADOTERATE MEGLUMINE 376.9 MG/ML
11 INJECTION INTRAVENOUS
Status: COMPLETED | OUTPATIENT
Start: 2019-01-01 | End: 2019-01-01

## 2019-01-01 RX ORDER — METHYLPREDNISOLONE 4 MG/1
TABLET ORAL
Qty: 1 DOSE PACK | Refills: 0 | Status: SHIPPED | OUTPATIENT
Start: 2019-01-01 | End: 2019-01-01

## 2019-01-01 RX ORDER — IPRATROPIUM BROMIDE AND ALBUTEROL SULFATE 2.5; .5 MG/3ML; MG/3ML
3 SOLUTION RESPIRATORY (INHALATION)
Status: DISCONTINUED | OUTPATIENT
Start: 2019-01-01 | End: 2019-01-01 | Stop reason: HOSPADM

## 2019-01-01 RX ORDER — SODIUM CHLORIDE 9 MG/ML
25 INJECTION, SOLUTION INTRAVENOUS CONTINUOUS
Status: DISCONTINUED | OUTPATIENT
Start: 2019-01-01 | End: 2019-01-01

## 2019-01-01 RX ORDER — SODIUM CHLORIDE 9 MG/ML
10 INJECTION INTRAMUSCULAR; INTRAVENOUS; SUBCUTANEOUS AS NEEDED
Status: ACTIVE | OUTPATIENT
Start: 2019-01-01 | End: 2019-01-01

## 2019-01-01 RX ORDER — ACETAMINOPHEN 325 MG/1
650 TABLET ORAL
Status: DISCONTINUED | OUTPATIENT
Start: 2019-01-01 | End: 2019-01-01 | Stop reason: HOSPADM

## 2019-01-01 RX ORDER — FENTANYL CITRATE 50 UG/ML
100 INJECTION, SOLUTION INTRAMUSCULAR; INTRAVENOUS
Status: DISCONTINUED | OUTPATIENT
Start: 2019-01-01 | End: 2019-01-01

## 2019-01-01 RX ORDER — LEVOFLOXACIN 500 MG/1
500 TABLET, FILM COATED ORAL DAILY
Qty: 7 TAB | Refills: 0 | Status: SHIPPED | OUTPATIENT
Start: 2019-01-01 | End: 2019-01-01

## 2019-01-01 RX ORDER — DEXAMETHASONE 0.5 MG/5ML
0.5 ELIXIR ORAL 3 TIMES DAILY
Qty: 450 ML | Refills: 0 | Status: SHIPPED | OUTPATIENT
Start: 2019-01-01 | End: 2019-01-01

## 2019-01-01 RX ORDER — SODIUM CHLORIDE 9 MG/ML
25 INJECTION, SOLUTION INTRAVENOUS CONTINUOUS
Status: CANCELLED | OUTPATIENT
Start: 2019-01-01

## 2019-01-01 RX ORDER — FENTANYL CITRATE 50 UG/ML
50 INJECTION, SOLUTION INTRAMUSCULAR; INTRAVENOUS
Status: DISCONTINUED | OUTPATIENT
Start: 2019-01-01 | End: 2019-01-01 | Stop reason: HOSPADM

## 2019-01-01 RX ORDER — LEVOTHYROXINE SODIUM 50 UG/1
50 TABLET ORAL
Qty: 30 TAB | Refills: 12 | Status: SHIPPED | OUTPATIENT
Start: 2019-01-01

## 2019-01-01 RX ORDER — AMLODIPINE BESYLATE 5 MG/1
TABLET ORAL
Qty: 90 TAB | Refills: 3 | Status: SHIPPED | OUTPATIENT
Start: 2019-01-01 | End: 2019-01-01

## 2019-01-01 RX ORDER — MIDAZOLAM HYDROCHLORIDE 1 MG/ML
5 INJECTION, SOLUTION INTRAMUSCULAR; INTRAVENOUS
Status: DISCONTINUED | OUTPATIENT
Start: 2019-01-01 | End: 2019-01-01

## 2019-01-01 RX ORDER — PANTOPRAZOLE SODIUM 40 MG/1
40 TABLET, DELAYED RELEASE ORAL DAILY
Qty: 60 TAB | Refills: 3 | Status: SHIPPED | OUTPATIENT
Start: 2019-01-01

## 2019-01-01 RX ORDER — AMLODIPINE BESYLATE 5 MG/1
5 TABLET ORAL DAILY
Status: DISCONTINUED | OUTPATIENT
Start: 2019-01-01 | End: 2019-01-01 | Stop reason: HOSPADM

## 2019-01-01 RX ORDER — LIDOCAINE AND PRILOCAINE 25; 25 MG/G; MG/G
CREAM TOPICAL AS NEEDED
Qty: 30 G | Refills: 0 | Status: SHIPPED | OUTPATIENT
Start: 2019-01-01

## 2019-01-01 RX ORDER — HEPARIN SODIUM 200 [USP'U]/100ML
400 INJECTION, SOLUTION INTRAVENOUS ONCE
Status: DISPENSED | OUTPATIENT
Start: 2019-01-01 | End: 2019-01-01

## 2019-01-01 RX ORDER — SODIUM CHLORIDE 0.9 % (FLUSH) 0.9 %
5-40 SYRINGE (ML) INJECTION EVERY 8 HOURS
Status: DISCONTINUED | OUTPATIENT
Start: 2019-01-01 | End: 2019-01-01 | Stop reason: HOSPADM

## 2019-01-01 RX ORDER — ONDANSETRON 2 MG/ML
8 INJECTION INTRAMUSCULAR; INTRAVENOUS ONCE
Status: CANCELLED | OUTPATIENT
Start: 2019-01-01

## 2019-01-01 RX ORDER — ALPRAZOLAM 1 MG/1
TABLET ORAL
Qty: 60 TAB | Refills: 3 | Status: SHIPPED | OUTPATIENT
Start: 2019-01-01

## 2019-01-01 RX ORDER — ALPRAZOLAM 0.5 MG/1
1 TABLET ORAL
Status: DISCONTINUED | OUTPATIENT
Start: 2019-01-01 | End: 2019-01-01 | Stop reason: HOSPADM

## 2019-01-01 RX ORDER — PRAVASTATIN SODIUM 40 MG/1
40 TABLET ORAL DAILY
Status: DISCONTINUED | OUTPATIENT
Start: 2019-01-01 | End: 2019-01-01 | Stop reason: HOSPADM

## 2019-01-01 RX ORDER — ALPRAZOLAM 1 MG/1
TABLET ORAL
Qty: 60 TAB | Refills: 3 | Status: SHIPPED | OUTPATIENT
Start: 2019-01-01 | End: 2019-01-01

## 2019-01-01 RX ORDER — LIDOCAINE HYDROCHLORIDE 20 MG/ML
10 INJECTION, SOLUTION EPIDURAL; INFILTRATION; INTRACAUDAL; PERINEURAL ONCE
Status: COMPLETED | OUTPATIENT
Start: 2019-01-01 | End: 2019-01-01

## 2019-01-01 RX ORDER — SODIUM CHLORIDE 0.9 % (FLUSH) 0.9 %
5-40 SYRINGE (ML) INJECTION AS NEEDED
Status: DISCONTINUED | OUTPATIENT
Start: 2019-01-01 | End: 2019-01-01 | Stop reason: HOSPADM

## 2019-01-01 RX ORDER — LIDOCAINE HYDROCHLORIDE AND EPINEPHRINE 10; 10 MG/ML; UG/ML
1.5 INJECTION, SOLUTION INFILTRATION; PERINEURAL ONCE
Status: COMPLETED | OUTPATIENT
Start: 2019-01-01 | End: 2019-01-01

## 2019-01-01 RX ORDER — IPRATROPIUM BROMIDE AND ALBUTEROL SULFATE 2.5; .5 MG/3ML; MG/3ML
3 SOLUTION RESPIRATORY (INHALATION)
Status: COMPLETED | OUTPATIENT
Start: 2019-01-01 | End: 2019-01-01

## 2019-01-01 RX ADMIN — FENTANYL CITRATE 25 MCG: 50 INJECTION, SOLUTION INTRAMUSCULAR; INTRAVENOUS at 09:40

## 2019-01-01 RX ADMIN — Medication 10 ML: at 13:00

## 2019-01-01 RX ADMIN — IPRATROPIUM BROMIDE AND ALBUTEROL SULFATE 3 ML: .5; 3 SOLUTION RESPIRATORY (INHALATION) at 08:07

## 2019-01-01 RX ADMIN — FENTANYL CITRATE 50 MCG: 50 INJECTION, SOLUTION INTRAMUSCULAR; INTRAVENOUS at 12:10

## 2019-01-01 RX ADMIN — FENTANYL CITRATE 25 MCG: 50 INJECTION, SOLUTION INTRAMUSCULAR; INTRAVENOUS at 09:55

## 2019-01-01 RX ADMIN — LIDOCAINE HYDROCHLORIDE 10 MG: 20 INJECTION, SOLUTION INFILTRATION; PERINEURAL at 12:19

## 2019-01-01 RX ADMIN — Medication 10 ML: at 06:45

## 2019-01-01 RX ADMIN — PANTOPRAZOLE SODIUM 40 MG: 40 TABLET, DELAYED RELEASE ORAL at 10:11

## 2019-01-01 RX ADMIN — Medication 10 ML: at 08:15

## 2019-01-01 RX ADMIN — FENTANYL CITRATE 25 MCG: 50 INJECTION, SOLUTION INTRAMUSCULAR; INTRAVENOUS at 09:45

## 2019-01-01 RX ADMIN — SODIUM CHLORIDE 25 ML/HR: 900 INJECTION, SOLUTION INTRAVENOUS at 09:50

## 2019-01-01 RX ADMIN — ZOLEDRONIC ACID 4 MG: 4 SOLUTION INTRAVENOUS at 11:35

## 2019-01-01 RX ADMIN — METHYLPREDNISOLONE SODIUM SUCCINATE 125 MG: 125 INJECTION, POWDER, FOR SOLUTION INTRAMUSCULAR; INTRAVENOUS at 15:21

## 2019-01-01 RX ADMIN — IOHEXOL 50 ML: 240 INJECTION, SOLUTION INTRATHECAL; INTRAVASCULAR; INTRAVENOUS; ORAL at 11:40

## 2019-01-01 RX ADMIN — Medication 10 ML: at 16:28

## 2019-01-01 RX ADMIN — SODIUM CHLORIDE 200 MG: 9 INJECTION, SOLUTION INTRAVENOUS at 11:05

## 2019-01-01 RX ADMIN — LEVOTHYROXINE SODIUM 50 MCG: 50 TABLET ORAL at 06:06

## 2019-01-01 RX ADMIN — IPRATROPIUM BROMIDE AND ALBUTEROL SULFATE 3 ML: .5; 3 SOLUTION RESPIRATORY (INHALATION) at 13:34

## 2019-01-01 RX ADMIN — IPRATROPIUM BROMIDE AND ALBUTEROL SULFATE 3 ML: .5; 3 SOLUTION RESPIRATORY (INHALATION) at 20:00

## 2019-01-01 RX ADMIN — IOPAMIDOL 80 ML: 755 INJECTION, SOLUTION INTRAVENOUS at 16:27

## 2019-01-01 RX ADMIN — SODIUM CHLORIDE, PRESERVATIVE FREE 300 UNITS: 5 INJECTION INTRAVENOUS at 09:58

## 2019-01-01 RX ADMIN — SODIUM CHLORIDE 880 MG: 900 INJECTION, SOLUTION INTRAVENOUS at 11:45

## 2019-01-01 RX ADMIN — Medication 10 ML: at 10:00

## 2019-01-01 RX ADMIN — IOHEXOL 50 ML: 240 INJECTION, SOLUTION INTRATHECAL; INTRAVASCULAR; INTRAVENOUS; ORAL at 10:00

## 2019-01-01 RX ADMIN — Medication 10 ML: at 11:40

## 2019-01-01 RX ADMIN — LEVOTHYROXINE SODIUM 50 MCG: 50 TABLET ORAL at 05:30

## 2019-01-01 RX ADMIN — Medication 500 UNITS: at 11:00

## 2019-01-01 RX ADMIN — ZOLEDRONIC ACID 4 MG: 4 SOLUTION INTRAVENOUS at 10:27

## 2019-01-01 RX ADMIN — MIDAZOLAM HYDROCHLORIDE 1 MG: 1 INJECTION, SOLUTION INTRAMUSCULAR; INTRAVENOUS at 09:34

## 2019-01-01 RX ADMIN — IOPAMIDOL 100 ML: 755 INJECTION, SOLUTION INTRAVENOUS at 10:00

## 2019-01-01 RX ADMIN — SODIUM CHLORIDE 150 MG: 900 INJECTION, SOLUTION INTRAVENOUS at 10:40

## 2019-01-01 RX ADMIN — LIDOCAINE HYDROCHLORIDE 20 MG: 20 INJECTION, SOLUTION INFILTRATION; PERINEURAL at 09:58

## 2019-01-01 RX ADMIN — MIDAZOLAM HYDROCHLORIDE 1 MG: 1 INJECTION, SOLUTION INTRAMUSCULAR; INTRAVENOUS at 10:00

## 2019-01-01 RX ADMIN — FENTANYL CITRATE 25 MCG: 50 INJECTION, SOLUTION INTRAMUSCULAR; INTRAVENOUS at 09:50

## 2019-01-01 RX ADMIN — SODIUM CHLORIDE 10 ML: 9 INJECTION INTRAMUSCULAR; INTRAVENOUS; SUBCUTANEOUS at 11:58

## 2019-01-01 RX ADMIN — IPRATROPIUM BROMIDE AND ALBUTEROL SULFATE 3 ML: .5; 3 SOLUTION RESPIRATORY (INHALATION) at 19:25

## 2019-01-01 RX ADMIN — SODIUM CHLORIDE 10 ML: 9 INJECTION INTRAMUSCULAR; INTRAVENOUS; SUBCUTANEOUS at 08:20

## 2019-01-01 RX ADMIN — FOLIC ACID 1 MG: 1 TABLET ORAL at 10:11

## 2019-01-01 RX ADMIN — Medication 10 ML: at 11:00

## 2019-01-01 RX ADMIN — Medication 10 ML: at 16:10

## 2019-01-01 RX ADMIN — IPRATROPIUM BROMIDE AND ALBUTEROL SULFATE 3 ML: .5; 3 SOLUTION RESPIRATORY (INHALATION) at 20:52

## 2019-01-01 RX ADMIN — Medication 500 UNITS: at 13:00

## 2019-01-01 RX ADMIN — SODIUM CHLORIDE 25 ML/HR: 900 INJECTION, SOLUTION INTRAVENOUS at 09:35

## 2019-01-01 RX ADMIN — LIDOCAINE HYDROCHLORIDE 10 MG: 20 INJECTION, SOLUTION INTRAVENOUS at 10:00

## 2019-01-01 RX ADMIN — Medication 10 ML: at 10:30

## 2019-01-01 RX ADMIN — IPRATROPIUM BROMIDE AND ALBUTEROL SULFATE 3 ML: .5; 3 SOLUTION RESPIRATORY (INHALATION) at 07:37

## 2019-01-01 RX ADMIN — Medication 20 ML: at 06:20

## 2019-01-01 RX ADMIN — IPRATROPIUM BROMIDE AND ALBUTEROL SULFATE 3 ML: .5; 3 SOLUTION RESPIRATORY (INHALATION) at 20:50

## 2019-01-01 RX ADMIN — FOLIC ACID 1 MG: 1 TABLET ORAL at 09:35

## 2019-01-01 RX ADMIN — Medication 20 ML: at 21:19

## 2019-01-01 RX ADMIN — FENTANYL CITRATE 50 MCG: 50 INJECTION, SOLUTION INTRAMUSCULAR; INTRAVENOUS at 12:09

## 2019-01-01 RX ADMIN — SODIUM CHLORIDE 200 MG: 900 INJECTION, SOLUTION INTRAVENOUS at 10:25

## 2019-01-01 RX ADMIN — Medication 40 ML: at 10:51

## 2019-01-01 RX ADMIN — PRAVASTATIN SODIUM 40 MG: 40 TABLET ORAL at 17:45

## 2019-01-01 RX ADMIN — Medication 500 UNITS: at 11:58

## 2019-01-01 RX ADMIN — SODIUM CHLORIDE 25 ML/HR: 900 INJECTION, SOLUTION INTRAVENOUS at 09:13

## 2019-01-01 RX ADMIN — IPRATROPIUM BROMIDE AND ALBUTEROL SULFATE 3 ML: .5; 3 SOLUTION RESPIRATORY (INHALATION) at 02:57

## 2019-01-01 RX ADMIN — IOPAMIDOL 100 ML: 755 INJECTION, SOLUTION INTRAVENOUS at 11:39

## 2019-01-01 RX ADMIN — MIDAZOLAM HYDROCHLORIDE 1 MG: 1 INJECTION, SOLUTION INTRAMUSCULAR; INTRAVENOUS at 09:48

## 2019-01-01 RX ADMIN — FOLIC ACID 1 MG: 1 TABLET ORAL at 09:04

## 2019-01-01 RX ADMIN — CYANOCOBALAMIN 1000 MCG: 1000 INJECTION, SOLUTION INTRAMUSCULAR at 10:25

## 2019-01-01 RX ADMIN — SODIUM CHLORIDE 200 MG: 900 INJECTION, SOLUTION INTRAVENOUS at 11:21

## 2019-01-01 RX ADMIN — AMLODIPINE BESYLATE 5 MG: 5 TABLET ORAL at 10:11

## 2019-01-01 RX ADMIN — SODIUM CHLORIDE 25 ML/HR: 900 INJECTION, SOLUTION INTRAVENOUS at 10:27

## 2019-01-01 RX ADMIN — Medication 10 ML: at 10:15

## 2019-01-01 RX ADMIN — PRAVASTATIN SODIUM 40 MG: 40 TABLET ORAL at 17:36

## 2019-01-01 RX ADMIN — Medication 20 ML: at 06:24

## 2019-01-01 RX ADMIN — IPRATROPIUM BROMIDE AND ALBUTEROL SULFATE 3 ML: .5; 3 SOLUTION RESPIRATORY (INHALATION) at 15:21

## 2019-01-01 RX ADMIN — PANTOPRAZOLE SODIUM 40 MG: 40 TABLET, DELAYED RELEASE ORAL at 09:35

## 2019-01-01 RX ADMIN — FENTANYL CITRATE 25 MCG: 50 INJECTION, SOLUTION INTRAMUSCULAR; INTRAVENOUS at 10:19

## 2019-01-01 RX ADMIN — Medication 10 ML: at 18:52

## 2019-01-01 RX ADMIN — IPRATROPIUM BROMIDE AND ALBUTEROL SULFATE 3 ML: .5; 3 SOLUTION RESPIRATORY (INHALATION) at 01:41

## 2019-01-01 RX ADMIN — IPRATROPIUM BROMIDE AND ALBUTEROL SULFATE 3 ML: .5; 3 SOLUTION RESPIRATORY (INHALATION) at 01:57

## 2019-01-01 RX ADMIN — SODIUM CHLORIDE 25 ML/HR: 900 INJECTION, SOLUTION INTRAVENOUS at 11:45

## 2019-01-01 RX ADMIN — CARBOPLATIN 500 MG: 10 INJECTION, SOLUTION INTRAVENOUS at 12:20

## 2019-01-01 RX ADMIN — Medication 2 G: at 11:45

## 2019-01-01 RX ADMIN — LEVOTHYROXINE SODIUM 50 MCG: 50 TABLET ORAL at 06:14

## 2019-01-01 RX ADMIN — SODIUM CHLORIDE 200 MG: 900 INJECTION, SOLUTION INTRAVENOUS at 10:49

## 2019-01-01 RX ADMIN — FOLIC ACID 1 MG: 1 TABLET ORAL at 09:28

## 2019-01-01 RX ADMIN — IPRATROPIUM BROMIDE AND ALBUTEROL SULFATE 3 ML: .5; 3 SOLUTION RESPIRATORY (INHALATION) at 14:37

## 2019-01-01 RX ADMIN — Medication 10 ML: at 17:28

## 2019-01-01 RX ADMIN — SODIUM CHLORIDE 25 ML/HR: 900 INJECTION, SOLUTION INTRAVENOUS at 10:49

## 2019-01-01 RX ADMIN — Medication 10 ML: at 08:10

## 2019-01-01 RX ADMIN — MIDAZOLAM HYDROCHLORIDE 2 MG: 1 INJECTION, SOLUTION INTRAMUSCULAR; INTRAVENOUS at 09:45

## 2019-01-01 RX ADMIN — LIDOCAINE HYDROCHLORIDE AND EPINEPHRINE 15 MG: 10; 10 INJECTION, SOLUTION INFILTRATION; PERINEURAL at 09:59

## 2019-01-01 RX ADMIN — Medication 20 ML: at 22:00

## 2019-01-01 RX ADMIN — Medication 20 ML: at 05:30

## 2019-01-01 RX ADMIN — MIDAZOLAM HYDROCHLORIDE 1 MG: 1 INJECTION, SOLUTION INTRAMUSCULAR; INTRAVENOUS at 09:50

## 2019-01-01 RX ADMIN — AMLODIPINE BESYLATE 5 MG: 5 TABLET ORAL at 09:35

## 2019-01-01 RX ADMIN — Medication 10 ML: at 22:00

## 2019-01-01 RX ADMIN — ZOLEDRONIC ACID 4 MG: 4 SOLUTION INTRAVENOUS at 09:35

## 2019-01-01 RX ADMIN — ZOLEDRONIC ACID 4 MG: 4 SOLUTION INTRAVENOUS at 09:55

## 2019-01-01 RX ADMIN — IPRATROPIUM BROMIDE AND ALBUTEROL SULFATE 3 ML: .5; 3 SOLUTION RESPIRATORY (INHALATION) at 07:46

## 2019-01-01 RX ADMIN — IPRATROPIUM BROMIDE AND ALBUTEROL SULFATE 3 ML: .5; 3 SOLUTION RESPIRATORY (INHALATION) at 02:59

## 2019-01-01 RX ADMIN — GADOTERATE MEGLUMINE 15 ML: 376.9 INJECTION INTRAVENOUS at 17:25

## 2019-01-01 RX ADMIN — AMLODIPINE BESYLATE 5 MG: 5 TABLET ORAL at 09:04

## 2019-01-01 RX ADMIN — SODIUM CHLORIDE 10 ML: 9 INJECTION, SOLUTION INTRAMUSCULAR; INTRAVENOUS; SUBCUTANEOUS at 08:10

## 2019-01-01 RX ADMIN — ACETAMINOPHEN 650 MG: 325 TABLET ORAL at 19:27

## 2019-01-01 RX ADMIN — Medication 2 G: at 09:13

## 2019-01-01 RX ADMIN — MIDAZOLAM HYDROCHLORIDE 1 MG: 1 INJECTION, SOLUTION INTRAMUSCULAR; INTRAVENOUS at 10:07

## 2019-01-01 RX ADMIN — LEVOTHYROXINE SODIUM 50 MCG: 50 TABLET ORAL at 06:43

## 2019-01-01 RX ADMIN — AMLODIPINE BESYLATE 5 MG: 5 TABLET ORAL at 09:26

## 2019-01-01 RX ADMIN — IPRATROPIUM BROMIDE AND ALBUTEROL SULFATE 3 ML: .5; 3 SOLUTION RESPIRATORY (INHALATION) at 13:42

## 2019-01-01 RX ADMIN — MIDAZOLAM 2 MG: 1 INJECTION INTRAMUSCULAR; INTRAVENOUS at 12:09

## 2019-01-01 RX ADMIN — PRAVASTATIN SODIUM 40 MG: 40 TABLET ORAL at 17:28

## 2019-01-01 RX ADMIN — Medication 500 UNITS: at 10:15

## 2019-01-01 RX ADMIN — MIDAZOLAM HYDROCHLORIDE 1 MG: 1 INJECTION, SOLUTION INTRAMUSCULAR; INTRAVENOUS at 09:42

## 2019-01-01 RX ADMIN — FENTANYL CITRATE 25 MCG: 50 INJECTION, SOLUTION INTRAMUSCULAR; INTRAVENOUS at 10:00

## 2019-01-01 RX ADMIN — ZOLEDRONIC ACID 4 MG: 4 SOLUTION INTRAVENOUS at 09:50

## 2019-01-01 RX ADMIN — MIDAZOLAM 1 MG: 1 INJECTION INTRAMUSCULAR; INTRAVENOUS at 12:10

## 2019-01-01 RX ADMIN — ONDANSETRON 8 MG: 2 INJECTION, SOLUTION INTRAMUSCULAR; INTRAVENOUS at 10:22

## 2019-01-01 RX ADMIN — Medication 500 UNITS: at 10:52

## 2019-01-01 RX ADMIN — Medication 10 ML: at 08:20

## 2019-01-01 RX ADMIN — FENTANYL CITRATE 50 MCG: 50 INJECTION, SOLUTION INTRAMUSCULAR; INTRAVENOUS at 09:35

## 2019-01-01 RX ADMIN — FLUTICASONE PROPIONATE 1 SPRAY: 50 SPRAY, METERED NASAL at 10:11

## 2019-01-01 RX ADMIN — MIDAZOLAM HYDROCHLORIDE 1 MG: 1 INJECTION, SOLUTION INTRAMUSCULAR; INTRAVENOUS at 09:55

## 2019-01-08 ENCOUNTER — HOSPITAL ENCOUNTER (OUTPATIENT)
Dept: CT IMAGING | Age: 74
Discharge: HOME | End: 2019-01-08
Attending: INTERNAL MEDICINE
Payer: MEDICARE

## 2019-01-08 ENCOUNTER — APPOINTMENT (OUTPATIENT)
Dept: CT IMAGING | Age: 74
End: 2019-01-08
Attending: INTERNAL MEDICINE
Payer: MEDICARE

## 2019-01-08 DIAGNOSIS — C32.9 LARYNGEAL SQUAMOUS CELL CARCINOMA (HCC): ICD-10-CM

## 2019-01-08 DIAGNOSIS — J90 PLEURAL EFFUSION, RIGHT: ICD-10-CM

## 2019-01-08 LAB — CREAT BLD-MCNC: 0.9 MG/DL (ref 0.6–1.3)

## 2019-01-08 PROCEDURE — 82565 ASSAY OF CREATININE: CPT

## 2019-01-08 PROCEDURE — 74011636320 HC RX REV CODE- 636/320: Performed by: INTERNAL MEDICINE

## 2019-01-08 PROCEDURE — 74011250636 HC RX REV CODE- 250/636: Performed by: INTERNAL MEDICINE

## 2019-01-08 PROCEDURE — 70491 CT SOFT TISSUE NECK W/DYE: CPT

## 2019-01-08 PROCEDURE — 71260 CT THORAX DX C+: CPT

## 2019-01-08 RX ORDER — SODIUM CHLORIDE 0.9 % (FLUSH) 0.9 %
10 SYRINGE (ML) INJECTION
Status: COMPLETED | OUTPATIENT
Start: 2019-01-08 | End: 2019-01-08

## 2019-01-08 RX ORDER — SODIUM CHLORIDE 9 MG/ML
50 INJECTION, SOLUTION INTRAVENOUS
Status: COMPLETED | OUTPATIENT
Start: 2019-01-08 | End: 2019-01-08

## 2019-01-08 RX ADMIN — Medication 10 ML: at 09:49

## 2019-01-08 RX ADMIN — IOPAMIDOL 100 ML: 755 INJECTION, SOLUTION INTRAVENOUS at 09:49

## 2019-01-08 RX ADMIN — SODIUM CHLORIDE 50 ML/HR: 900 INJECTION, SOLUTION INTRAVENOUS at 09:49

## 2019-01-11 ENCOUNTER — OFFICE VISIT (OUTPATIENT)
Dept: ONCOLOGY | Age: 74
End: 2019-01-11

## 2019-01-11 VITALS
OXYGEN SATURATION: 98 % | RESPIRATION RATE: 16 BRPM | HEART RATE: 69 BPM | HEIGHT: 70 IN | BODY MASS INDEX: 19.58 KG/M2 | TEMPERATURE: 97.2 F | SYSTOLIC BLOOD PRESSURE: 155 MMHG | WEIGHT: 136.8 LBS | DIASTOLIC BLOOD PRESSURE: 77 MMHG

## 2019-01-11 DIAGNOSIS — C32.9 LARYNGEAL SQUAMOUS CELL CARCINOMA (HCC): Primary | ICD-10-CM

## 2019-01-11 DIAGNOSIS — M89.9 BONE LESION: ICD-10-CM

## 2019-01-11 NOTE — PROGRESS NOTES
2001 [facility] 
at [place] [address], [facility], [address] 
[phone] Follow-up Note Patient: Lynn Herrera MRN: [id number]  SSN: [id number] YOB: 1945  Age: 75 y.o. Sex: male Diagnosis:  
 
1. Squamous cell carcinoma of the larynx:  
 T3 N0 M0 (Stage III) HPV status unknown Treatment: 1. Concurrent chemo/rads with cisplatin, completed 10/2016 Subjective:  
  
Lynn Herrera is a 75 y.o. male with a diagnosis of squamous cell laryngeal carcinoma. He was diagnosed by Dr. Alicia in July 2016. He has long history of pipe smoking. He quit all smoking more than 30 years ago. Mr. Tomasz Bacon received concurrent radiation with weekly Cisplatin. CT shows resolving laryngeal mass. Laryngoscopy by Dr. Alicia on 1/19/2017 shows no tumor. Recent CT shows a questionable area in the spine. He continues to have a dry mouth and denies any bone pain. Appetite is good. Review of Systems:  
 
Constitutional: negative Eyes: negative Ears, Nose, Mouth, Throat, and Face: dry mouth Respiratory: negative Cardiovascular: negative Gastrointestinal: poor taste Genitourinary:negative Integument/Breast: negative Hematologic/Lymphatic: negative Musculoskeletal:negative Neurological: negative Past Medical History:  
Diagnosis Date  Acquired hypothyroidism 3/5/2018  
 HTN (hypertension)  Hypercholesterolemia  Hyperlipidemia  Laryngeal squamous cell carcinoma ([facility] [address] 2016  Subclinical hypothyroidism Past Surgical History:  
Procedure Laterality Date  HX ORTHOPAEDIC Back surgery x3 Family History Problem Relation Age of Onset  Dementia Mother  Alcohol abuse Father Social History Tobacco Use  Smoking status: Former Smoker  Smokeless tobacco: Never Used Substance Use Topics  Alcohol use: No  
  
Prior to Admission medications Medication Sig Start Date End Date Taking? Authorizing Provider ALPRAZolam (XANAX) 1 mg tablet TAKE ONE-HALF TO ONE TABLET BY MOUTH EVERY 12 HOURS AS NEEDED 5/9/18  Yes Aleksandr Samano MD  
levothyroxine (SYNTHROID) 50 mcg tablet Take 1 Tab by mouth Daily (before breakfast). For thyroid 3/5/18  Yes Aleksandr Samano MD  
pravastatin (PRAVACHOL) 40 mg tablet TAKE ONE TABLET BY MOUTH ONCE DAILY FOR CHOLESTEROL 2/26/18  Yes Aleksandr Samano MD  
amLODIPine (NORVASC) 5 mg tablet TAKE ONE TABLET BY MOUTH ONCE DAILY FOR BLOOD PRESSURE 1/17/18  Yes Aleksandr Samano MD  
  
 
 
Allergies Allergen Reactions  Morphine Rash  Lisinopril Other (comments)  
  dizziness  Propranolol Other (comments)  
  fatigue Objective:  
 
Vitals:  
 01/11/19 1004 BP: 155/77 Pulse: 69 Resp: 16 Temp: 97.2 °F (36.2 °C) TempSrc: Oral  
SpO2: 98% Physical Exam: 
 
GENERAL: alert, cooperative HEENT: throat dry LYMPHATIC: Cervical, supraclavicular, and axillary nodes normal.  
THROAT & NECK: oral thrush LUNG: clear to auscultation bilaterally HEART: regular rate and rhythm ABDOMEN: soft, non-tender EXTREMITIES: no cyanosis or edema SKIN: Normal. 
NEUROLOGIC: negative CT Results (most recent): 
Results from Hospital Encounter encounter on 01/08/19 CT CHEST W CONT Narrative Clinical indication: Squamous carcinoma larynx. Axial CT scan of the neck and chest obtained after intravenous injection of 100 
cc of Isovue-370. Coronal and sagittal reconstructions. Comparison July 19, 2018. CT dose reduction was achieved through the use of a standardized protocol 
tailored for this examination and automatic exposure control for dose modulation Benna Him Benna Him Apical scarring with pleural thickening is stable on the right. Stable small 
right pleural effusion. Slightly prominent mediastinal and hilar nodes appear stable, a precarinal node measures 14 mm, it was 13.6 on the prior examination. Major vessels appear 
patent. Diffuse coronary artery calcification. The heart size is normal. There 
is no pericardial or left pleural effusion. No pneumothorax or shift. Section through the upper abdomen show no focal findings. Severe changes of COPD once again demonstrated. Calcification and lung nodules 
appears stable. No new parenchymal abnormality. There is no adenopathy in the neck, there is no compromise of the airway, 
salivary glands appear unremarkable. Vessels are patent with diffuse 
calcification. Mucosal thickening in the paranasal sinuses. Progression of bone disease Impression IMPRESSION:  
Stable neck CT without evidence to suggest residual or recurrent disease. Stable chest CT with severe chronic changes and lung nodules. Progression of bone disease with increased size and number of blastic changes in 
the spine I reviewed the images personally. Abnormal sclerotic areas in multiple vertebrae Assessment:  
 
1. Squamous cell carcinoma of the larynx:  
 T3 N0 M0 (Stage III) HPV status unknown ECOG PS 0 Intent of treatment - curative The standard of care for T3 laryngeal carcinoma is concurrent chemotherapy with radiation. Completed concurrent chemo/rads 10/2016 Cisplatin weekly X 6 Laryngoscopy on 7/19/2018 - no malignancy Repeat CT chest and soft tissue neck (1/8/2019) -  Stable neck without evidence of disease, however it shows a number of blastic changes in the spine. Plan to obtain a NM bone scan Asymptomatic 2. Dry mouth 
 
> using salt water 
> has mouth rinse Plan:  
 
 
> NM bone scan ordered 
> Follow up in 3 months. If scan shows anything concerning, we will see sooner. Signed by: Willian Salazar MD 
                   January 11, 2019 
 
 
 
CC. Florencia Celis MD ([facility]) CC. Alejandro Reid MD 
CC.  Courtney Mason MD

## 2019-01-11 NOTE — PROGRESS NOTES
Blease Hashimoto is a 75 y.o. male here today for SCC of Larynx f/u. Completed chemo/Rads. Patient in remission. VS stable. Patient denies pain. Good appetite. Patient denies N/V/D and constipation. Patient denies numbness and tingling. Patient denies mouth ulcers. Patient denies cough. Patient denies SOB. Patient denies falls. Visit Vitals /77 (BP 1 Location: Left arm, BP Patient Position: Sitting) Pulse 69 Temp 97.2 °F (36.2 °C) (Oral) Resp 16 Ht 5' 10\" (1.778 m) Wt 136 lb 12.8 oz (62.1 kg) SpO2 98% BMI 19.63 kg/m² Health Maintenance Review: Patient reminded of \"due or due soon\" health maintenance. I have asked the patient to contact his/her primary care provider (PCP) for follow-up on his/her health maintenance.

## 2019-01-15 DIAGNOSIS — C32.9 LARYNGEAL SQUAMOUS CELL CARCINOMA (HCC): Primary | ICD-10-CM

## 2019-01-21 ENCOUNTER — HOSPITAL ENCOUNTER (OUTPATIENT)
Dept: NUCLEAR MEDICINE | Age: 74
Discharge: HOME | End: 2019-01-21
Attending: INTERNAL MEDICINE
Payer: MEDICARE

## 2019-01-21 DIAGNOSIS — C32.9 LARYNGEAL SQUAMOUS CELL CARCINOMA (HCC): ICD-10-CM

## 2019-01-21 PROCEDURE — 78306 BONE IMAGING WHOLE BODY: CPT

## 2019-02-13 NOTE — PROGRESS NOTES
Madison Degroot is a 75 y.o. male here today for SCC of Larynx f/u. Completed chemo/Rads. Patient here today for abnormal bone scan on 1/21. VS stable. Patient denies pain. Good appetite. Patient denies N/V/D and constipation. Patient denies numbness and tingling. Patient denies mouth ulcers. Patient denies cough. Patient denies SOB. Patient denies falls. Visit Vitals /84 (BP 1 Location: Left arm, BP Patient Position: Sitting) Pulse 85 Temp 97.6 °F (36.4 °C) (Oral) Resp 18 Ht 5' 10\" (1.778 m) Wt 133 lb (60.3 kg) SpO2 97% BMI 19.08 kg/m² Pain Scale: 0 - No pain/10 Pain Location: 1. Have you been to the ER, urgent care clinic since your last visit? Hospitalized since your last visit? No 
 
2. Have you seen or consulted any other health care providers outside of the [address] [place] [address] since your last visit? Include any pap smears or colon screening. No  
 
Health Maintenance Review: Patient reminded of \"due or due soon\" health maintenance. I have asked the patient to contact his/her primary care provider (PCP) for follow-up on his/her health maintenance.

## 2019-02-13 NOTE — PROGRESS NOTES
[facility] 
at [place] [address], [facility], [address] 
[phone] Follow-up Note Patient: Danny Butt MRN: [id number]  SSN: [id number] YOB: 1945  Age: 75 y.o. Sex: male Diagnosis:  
 
1. Squamous cell carcinoma of the larynx:  
 T3 N0 M0 (Stage III) HPV status unknown Treatment: 1. Concurrent chemo/rads with cisplatin, completed 10/2016 Subjective:  
  
Danny Butt is a 75 y.o. male with a diagnosis of squamous cell laryngeal carcinoma. He was diagnosed by Dr. Alicia in July 2016. He has long history of pipe smoking. He quit all smoking more than 30 years ago. Mr. Peter Trinh received concurrent radiation with weekly Cisplatin. CT shows resolving laryngeal mass. Laryngoscopy by Dr. Alicia on 1/19/2017 shows no tumor. Recent CT shows a questionable area in the spine. He had a NM bone scan which showed multiple lesions in the bone. He is weak. Appetite is good. Review of Systems:  
 
Constitutional: negative Eyes: negative Ears, Nose, Mouth, Throat, and Face: dry mouth Respiratory: negative Cardiovascular: negative Gastrointestinal: poor taste Genitourinary:negative Integument/Breast: negative Hematologic/Lymphatic: negative Musculoskeletal:negative Neurological: negative Past Medical History:  
Diagnosis Date  Acquired hypothyroidism 3/5/2018  
 HTN (hypertension)  Hypercholesterolemia  Hyperlipidemia  Laryngeal squamous cell carcinoma ([address] 2016  Subclinical hypothyroidism Past Surgical History:  
Procedure Laterality Date  HX ORTHOPAEDIC Back surgery x3 Family History Problem Relation Age of Onset  Dementia Mother  Alcohol abuse Father Social History Tobacco Use  Smoking status: Former Smoker  Smokeless tobacco: Never Used Substance Use Topics  Alcohol use:  No  
  
 Prior to Admission medications Medication Sig Start Date End Date Taking? Authorizing Provider  
amLODIPine (NORVASC) 5 mg tablet TAKE ONE TABLET BY MOUTH ONCE DAILY FOR BLOOD PRESSURE 2/5/19  Yes Luis A Dunn MD  
ALPRAZolam Davy Budds) 1 mg tablet TAKE ONE-HALF TO ONE TABLET BY MOUTH EVERY 12 HOURS AS NEEDED 5/9/18  Yes Luis A Dunn MD  
levothyroxine (SYNTHROID) 50 mcg tablet Take 1 Tab by mouth Daily (before breakfast). For thyroid 3/5/18  Yes Luis A Dunn MD  
pravastatin (PRAVACHOL) 40 mg tablet TAKE ONE TABLET BY MOUTH ONCE DAILY FOR CHOLESTEROL 2/26/18  Yes Luis A Dunn MD  
  
 
 
Allergies Allergen Reactions  Morphine Rash  Lisinopril Other (comments)  
  dizziness  Propranolol Other (comments)  
  fatigue Objective:  
 
Vitals:  
 02/13/19 5113 BP: 122/84 Pulse: 85 Resp: 18 Temp: 97.6 °F (36.4 °C) TempSrc: Oral  
SpO2: 97% Weight: 133 lb (60.3 kg) Height: 5' 10\" (1.778 m) Physical Exam: 
 
GENERAL: alert, cooperative HEENT: throat dry LYMPHATIC: Cervical, supraclavicular, and axillary nodes normal.  
THROAT & NECK: oral thrush LUNG: clear to auscultation bilaterally HEART: regular rate and rhythm ABDOMEN: soft, non-tender EXTREMITIES: no cyanosis or edema SKIN: Normal. 
NEUROLOGIC: negative CT Results (most recent): 
Results from Hospital Encounter encounter on 01/08/19 CT CHEST W CONT Narrative Clinical indication: Squamous carcinoma larynx. Axial CT scan of the neck and chest obtained after intravenous injection of 100 
cc of Isovue-370. Coronal and sagittal reconstructions. Comparison July 19, 2018. CT dose reduction was achieved through the use of a standardized protocol 
tailored for this examination and automatic exposure control for dose modulation Minor Ronde Minor Ronde Apical scarring with pleural thickening is stable on the right. Stable small 
right pleural effusion. Slightly prominent mediastinal and hilar nodes appear stable, a precarinal node 
measures 14 mm, it was 13.6 on the prior examination. Major vessels appear 
patent. Diffuse coronary artery calcification. The heart size is normal. There 
is no pericardial or left pleural effusion. No pneumothorax or shift. Section through the upper abdomen show no focal findings. Severe changes of COPD once again demonstrated. Calcification and lung nodules 
appears stable. No new parenchymal abnormality. There is no adenopathy in the neck, there is no compromise of the airway, 
salivary glands appear unremarkable. Vessels are patent with diffuse 
calcification. Mucosal thickening in the paranasal sinuses. Progression of bone disease Impression IMPRESSION:  
Stable neck CT without evidence to suggest residual or recurrent disease. Stable chest CT with severe chronic changes and lung nodules. Progression of bone disease with increased size and number of blastic changes in 
the spine I reviewed the images personally. Abnormal sclerotic areas in multiple vertebrae Assessment: 1. Metastatic cancer in the bones 2. Squamous cell carcinoma of the larynx:  
 T3 N0 M0 (Stage III) HPV status unknown ECOG PS 1 Completed concurrent chemo/rads 10/2016 Cisplatin weekly X 6 Laryngoscopy on 7/19/2018 - no malignancy Repeat CT chest and soft tissue neck (1/8/2019) -  Stable neck without evidence of disease, however it shows a number of blastic changes in the spine. NM bone scan -  1/21/2019 There are multiple small foci of increased activity in the sternum, spine, ribs, bony pelvis and right lesser trochanter suspicious for bony metastatic disease. Plan for MRI of the LS spine to determine the best place to a biopsy. I discussed the implication of the possible metastatic diagnosis. Definitive plans of treatment will be made after the biopsy results. Plan: > PD-L1 on the 2016 specimen 
> MRI of LS spine for planned biopsy 
> Follow up in 3 weeks to discuss treatment options Signed by: Ami Nieto MD 
                   February 13, 2019 
 
 
 
CC. Brice Calvillo MD ([facility]) CC. Cole Gutiérrez MD 
CC.  Sarah Bowles MD

## 2019-02-26 NOTE — PROGRESS NOTES
HISTORY OF PRESENT ILLNESS Golda Heimlich is a 75 y.o. male. HPI Has been to see Dr. Katherine Bragg, has had a bone scan showing numerous small apparent mets, and has had an MRI of pelvis and Lumbar spine showing numerous lytic lesions. Sees Dr. Katherine Bragg this Tuesday, and they are discussing do a bone biopsy. Otherwise doing reasonably well. Says that doesn't sleep well at night, and appetite isnt that great. Was originally dxed with laryhgeal cancer 7-2016. Sees Dr. Ferro(oncology), dr. Alicia (ENT), and Dr. Tahir Bernabe (radiation therapy). Declines flu and pneumonia shots. Review of Systems HENT:  
     Hearing has been bad for a long time. Neurological: Negative. No falls, no cane or walker. Independent in all adls. Psychiatric/Behavioral: Positive for depression. Feels safe at home. No alcohol. Physical Exam  
Constitutional: He appears well-developed and well-nourished. HENT:  
Right Ear: External ear normal.  
Left Ear: External ear normal.  
Mouth/Throat: Oropharynx is clear and moist.  
Neck: No thyromegaly present. Cardiovascular: Normal rate, regular rhythm, normal heart sounds and intact distal pulses. Pulmonary/Chest: Effort normal and breath sounds normal. No respiratory distress. He has no wheezes. Abdominal: Soft. Bowel sounds are normal. He exhibits no distension and no mass. There is no tenderness. There is no guarding. Musculoskeletal: Normal range of motion. He exhibits no edema. Lymphadenopathy:  
  He has no cervical adenopathy. Nursing note and vitals reviewed. ASSESSMENT and PLAN Orders Placed This Encounter  METABOLIC PANEL, COMPREHENSIVE  
 CBC WITH AUTOMATED DIFF  
 LIPID PANEL  ALPRAZolam (XANAX) 1 mg tablet  levothyroxine (SYNTHROID) 50 mcg tablet  pravastatin (PRAVACHOL) 40 mg tablet Diagnoses and all orders for this visit: 1. Encounter for Medicare annual wellness exam 
 
2. Anxiety -     ALPRAZolam (XANAX) 1 mg tablet; TAKE ONE-HALF TO ONE TABLET BY MOUTH EVERY 12 HOURS AS NEEDED 3. Hypercholesterolemia -     LIPID PANEL 4. Essential hypertension -     METABOLIC PANEL, COMPREHENSIVE 
-     CBC WITH AUTOMATED DIFF Other orders -     levothyroxine (SYNTHROID) 50 mcg tablet; Take 1 Tab by mouth Daily (before breakfast). For thyroid -     pravastatin (PRAVACHOL) 40 mg tablet; TAKE ONE TABLET BY MOUTH ONCE DAILY FOR CHOLESTEROL Follow-up Disposition: Not on File

## 2019-02-26 NOTE — PROGRESS NOTES
Chief Complaint Patient presents with  
 Skin Problem  
  just below right scapula  Abnormal Lab Results  
  pathology report from Dec 2018  Thyroid Problem  Cholesterol Problem 1. Have you been to the ER, urgent care clinic since your last visit? Hospitalized since your last visit? No 
 
2. Have you seen or consulted any other health care providers outside of the [address] [place] [address] since your last visit? Include any pap smears or colon screening. No  
 
Health Maintenance Due Topic Date Due  Shingrix Vaccine Age 50> (1 of 2) 06/09/1994  GLAUCOMA SCREENING Q2Y  06/18/2017  MEDICARE YEARLY EXAM  08/30/2018

## 2019-03-04 NOTE — DISCHARGE INSTRUCTIONS
[facility]  Special Procedures/Radiology Department    Radiologist:  Dr. Nomi Louis      Date:    3/4/2019    Biopsy Discharge Instructions    You may have an aching pain in the biopsy site tonight. You may Tylenol, as directed on the label, for pain or discomfort. Avoid ibuprofen (Advil, Motrin) and aspirin for the next 48 hours as these drugs may cause you to bleed. Watch for bleeding from the biopsy site. Hold pressure to the area for at least 15 minutes if bleeding does occur. If you have severe pain or swelling at the site, go directly to the nearest Emergency Room. Watch for signs of infection:  redness, pain, pus, drainage, fever or chills. If this occurs, call your doctor. Resume your previous diet and follow the medication reconciliation form. Rest the remainder of today. Do not lift anything heavier than a small grocery bag (10 pounds) for the next 5 days. It may take up to 3 days for your test results to become available to your physician. Call your physician if you have not heard anything after 3 business day. If you have any questions or concerns, please call [phone] and ask to speak to the nurse on-call.

## 2019-03-04 NOTE — PROGRESS NOTES
Name of procedure: Bone Biopsy    Complications, if any, r/t procedure: none     Sedation medications given: 6 mg Versed, 125 mcg Fentnayl    Sedation tolerated: well    VS : Stable     Pt tolerated procedure well. VSS. No C/O pain. Dressing ot site D&I. No bleeding or hematoma noted to site. Pt resting comfortably on stretcher post procedure. Wife at bedside. MD has assessed pt post procedure and per MD pt may be discharged. IV D/Cd. Discharge instructions given. Copy on chart and copy given to pt. Pt and wife verbalize understanding. Pt taken to car by wheelchair and taken home by family. NAD noted at time of discharge.

## 2019-03-04 NOTE — H&P
Radiology History and Physical    Patient: Max [facility] 75 y.o. male       Chief Complaint: No chief complaint on file. History of Present Illness: rt illiac lesion    History:    Past Medical History:   Diagnosis Date    Acquired hypothyroidism 3/5/2018    HTN (hypertension)     Hypercholesterolemia     Hyperlipidemia     Laryngeal squamous cell carcinoma ([facility] [address] 2016    Subclinical hypothyroidism      Family History   Problem Relation Age of Onset    Dementia Mother     Alcohol abuse Father      Social History     Socioeconomic History    Marital status:      Spouse name: Not on file    Number of children: Not on file    Years of education: Not on file    Highest education level: Not on file   Social Needs    Financial resource strain: Not on file    Food insecurity - worry: Not on file    Food insecurity - inability: Not on file   [employer] needs - medical: Not on file   [employer] needs - non-medical: Not on file   Occupational History    Not on file   Tobacco Use    Smoking status: Former Smoker    Smokeless tobacco: Never Used   Substance and Sexual Activity    Alcohol use: No    Drug use: No    Sexual activity: Yes     Partners: Female   Other Topics Concern    Not on file   Social History Narrative    Not on file       Allergies: Allergies   Allergen Reactions    Morphine Rash    Lisinopril Other (comments)     dizziness    Propranolol Other (comments)     fatigue       Current Medications:  Current Outpatient Medications   Medication Sig    ALPRAZolam (XANAX) 1 mg tablet TAKE ONE-HALF TO ONE TABLET BY MOUTH EVERY 12 HOURS AS NEEDED    levothyroxine (SYNTHROID) 50 mcg tablet Take 1 Tab by mouth Daily (before breakfast).  For thyroid    pravastatin (PRAVACHOL) 40 mg tablet TAKE ONE TABLET BY MOUTH ONCE DAILY FOR CHOLESTEROL    amLODIPine (NORVASC) 5 mg tablet TAKE ONE TABLET BY MOUTH ONCE DAILY FOR BLOOD PRESSURE     No current facility-administered medications for this encounter. Physical Exam:  Blood pressure 124/79, pulse (!) 54, temperature 98 °F (36.7 °C), resp. rate 16, height 5' 10\" (1.778 m), weight 60.8 kg (134 lb), SpO2 96 %. GENERAL: alert, cooperative, no distress, appears stated age, LUNG: clear to auscultation bilaterally, HEART: regular rate and rhythm, S1, S2 normal, no murmur, click, rub or gallop      Alerts:    Hospital Problems  Date Reviewed: 2/26/2019    None          Laboratory:    No results for input(s): HGB, HCT, WBC, PLT, INR, BUN, CREA, K, CRCLT, HGBEXT, HCTEXT, PLTEXT in the last 72 hours. No lab exists for component: PTT, PT, INREXT      Plan of Care/Planned Procedure:  Risks, benefits, and alternatives reviewed with patient and he agrees to proceed with the procedure.        Kimani Jim MD

## 2019-03-06 NOTE — PROGRESS NOTES
[address], [address] [place] [address] Cristino Gutierrez, 200 [facility]  [phone]       Follow-up Note        Patient: Paul Powell MRN: [id number]  SSN: [id number]    YOB: 1945  Age: 75 y.o. Sex: male        Diagnosis:     1. Recurrent squamous cell carcinoma    Multifocal disease in the bone    2. 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 Concurrent chemo/rads with cisplatin, completed 10/2016    Subjective:      Paul Powell is a 75 y.o. male with a diagnosis of squamous cell laryngeal carcinoma. He was diagnosed by Dr. Alicia in July 2016. He has long history of pipe smoking. He quit all smoking more than 30 years ago. Mr. Veronika Salas received concurrent radiation with weekly Cisplatin. CT shows resolving laryngeal mass. Laryngoscopy by Dr. Alicia on 1/19/2017 shows no tumor. Recent CT shows a questionable area in the spine. He had a NM bone scan which showed multiple lesions in the bone. Biopsy of the bone showed metastatic disease. He is here today with his wife to discuss treatment options.       Review of Systems:     Constitutional: negative  Eyes: negative  Ears, Nose, Mouth, Throat, and Face: dry mouth  Respiratory: negative  Cardiovascular: negative  Gastrointestinal: poor taste  Genitourinary:negative  Integument/Breast: negative  Hematologic/Lymphatic: negative  Musculoskeletal:negative  Neurological: negative      Past Medical History:   Diagnosis Date    Acquired hypothyroidism 3/5/2018    HTN (hypertension)     Hypercholesterolemia     Hyperlipidemia     Laryngeal squamous cell carcinoma ([facility] [address] 2016    Subclinical hypothyroidism      Past Surgical History:   Procedure Laterality Date    HX ORTHOPAEDIC      Back surgery x3      Family History   Problem Relation Age of Onset    Dementia Mother     Alcohol abuse Father      Social History Tobacco Use    Smoking status: Former Smoker    Smokeless tobacco: Never Used   Substance Use Topics    Alcohol use: No      Prior to Admission medications    Medication Sig Start Date End Date Taking? Authorizing Provider   ALPRAZolam Rosetta Scales) 1 mg tablet TAKE ONE-HALF TO ONE TABLET BY MOUTH EVERY 12 HOURS AS NEEDED 2/26/19  Yes Sheryl Dasilva MD   levothyroxine (SYNTHROID) 50 mcg tablet Take 1 Tab by mouth Daily (before breakfast). For thyroid 2/26/19  Yes Sheryl Dasilva MD   pravastatin (PRAVACHOL) 40 mg tablet TAKE ONE TABLET BY MOUTH ONCE DAILY FOR CHOLESTEROL 2/26/19  Yes Sheryl Dasilva MD   amLODIPine (NORVASC) 5 mg tablet TAKE ONE TABLET BY MOUTH ONCE DAILY FOR BLOOD PRESSURE 2/5/19  Yes Sheryl Dasilva MD          Allergies   Allergen Reactions    Morphine Rash    Lisinopril Other (comments)     dizziness    Propranolol Other (comments)     fatigue           Objective:     Visit Vitals  /83 (BP 1 Location: Left arm, BP Patient Position: Sitting)   Pulse 79   Temp 97.4 °F (36.3 °C) (Oral)   Resp 18   Ht 5' 10\" (1.778 m)   Wt 133 lb 6.4 oz (60.5 kg)   SpO2 98%   BMI 19.14 kg/m²       Pain Scale: 0 - No pain/10  Pain Location:       Physical Exam:    GENERAL: alert, cooperative  HEENT: throat dry  LYMPHATIC: Cervical, supraclavicular, and axillary nodes normal.   THROAT & NECK: oral thrush  LUNG: clear to auscultation bilaterally  HEART: regular rate and rhythm  ABDOMEN: soft, non-tender  EXTREMITIES: no cyanosis or edema  SKIN: Normal.  NEUROLOGIC: negative      MRI Results (most recent):  Results from Hospital Encounter encounter on 02/21/19   MRI LUMB SPINE W WO CONT    Narrative EXAM: MRI LUMB SPINE W WO CONT    INDICATION: Abnormal bone scan. Malignant neoplasm of larynx, squamous cell  carcinoma C 32.9. R 94.8. Back pain.     COMPARISON: Radiographs 1/23/2013, bone scan 1/21/2019    TECHNIQUE: MR imaging of the lumbar spine was performed using the following  sequences: sagittal T1, T2, STIR;  axial T1, T2 prior to and following contrast  administration. CONTRAST: 13 mL of Dotarem. FINDINGS:    Conus position, morphology, signal and enhancement are normal. Vertebral body  heights are normally maintained. There are several areas of abnormal bone signal consistent with osseous  metastatic disease. These include the anterior T12 vertebral body measuring 10  mm and 3 mm, the right anteroinferior L1 vertebral body measuring 5 mm, the mid  anteroinferior L2 vertebral body measuring 6 mm, the left posterior L3 vertebral  body measuring 12 mm and 6 mm, the posterior L5 vertebral body measuring 4 mm,  the inferior L5 vertebral body measuring 12 mm, at the visualized superior right  sacral wing measuring 9 mm, the left sacral wing measuring 6 mm, and the left  posterior iliac bone measuring 9 mm and 6 mm. There is L2-3 retrolisthesis measuring 5 mm and L3-4 retrolisthesis measuring 2  to 3 mm. There is a mild levoconvex lumbar curve centered at L3-4. No paraspinal soft tissue mass or enhancement abnormality is shown. Ankylosis of  the visualized superior right SI joint is shown with bridging osteophyte  formation. Small left SI joint osteophytes are also noted. T12-L1: Normal disc and facets. L1-L2: Mild disc space narrowing and diffuse disc bulging. Mild bilateral facet  osteoarthrosis. No canal or foraminal stenosis. L2-L3: Moderate to severe disc space narrowing with mild to moderate diffuse  disc bulging and bilateral facet osteoarthrosis. Mild canal stenosis and right  foraminal stenosis. L3-L4: Moderate disc space narrowing and diffuse disc bulging. Mild to moderate  bilateral facet osteoarthrosis. Moderate canal stenosis. Mild to moderate right  and mild left foraminal stenosis. L4-L5: Moderate to severe disc space narrowing. Mild diffuse disc bulging. Mild  to moderate bilateral facet osteoarthrosis. Mild to moderate canal stenosis.   Mild to moderate right foraminal stenosis. L5-S1: Moderate to severe disc space narrowing. Mild to moderate diffuse disc  bulging. Mild bilateral facet osteoarthrosis. No canal stenosis. Moderate left  foraminal stenosis. Impression IMPRESSION:    1. Multifocal osseous metastatic disease with lesion size measuring up to 12 mm. 2. Multilevel degenerative changes detailed by level above. Note L3-4 moderate  canal stenosis. I reviewed the images personally. Abnormal sclerotic areas in multiple vertebrae        Assessment:     1. Metastatic cancer in the bones    2. Squamous cell carcinoma of the larynx:    T3 N0 M0 (Stage III)    HPV status unknown    ECOG PS 1    Completed concurrent chemo/rads 10/2016   Cisplatin weekly X 6    Laryngoscopy on 7/19/2018 - no malignancy    Repeat CT chest and soft tissue neck (1/8/2019) -  Stable neck without evidence of disease, however it shows a number of blastic changes in the spine. NM bone scan - 1/21/2019  There are multiple small foci of increased activity in the sternum, spine, ribs, bony pelvis and right lesser trochanter suspicious for bony metastatic disease. Pathology results from bone biopsy confirm a diagnosis of metastatic squamous cell carcinoma. CPS score on tumor tissue. Pending results, will plan to start treatment with Nivolumab or Pembrolizumab. If CPS score > 20, will treat with Pembrolizumab. Patient was counseled regarding immunotherapy. Discussion included side effects, toxicity, benefit and risks of immunotherapy. He understood the expected side effects which may include fatigue or immune-related colitis, pneumonitis, rash, among other things. After weighing the benefits and risks, he agreed to proceed with immunotherapy. Symptom management form reviewed with patient. The duration of this treatment plan will be until progression, intolerable side effects, or patient choice.  Patient will be meeting with navigation services to discuss any financial barriers to care/estimated cost of care. We will plan to see the patient in follow up at least once per cycle, or sooner if symptoms warrant. 2. Protein calorie malnutrition, severe    Dietician consult  Protein supplementation      Plan:       > CPS score on tumor tissue  > Port placement  > Start Xgeva monthly  > Plan to start immunotherapy with either Nivolumab or Pembrolizumab  > Follow-up at start of treatment        Signed by: Mikel Kate MD                     March 6, 2019        CC. Charli Salas MD ([facility])  CC. Marly Kurtz MD  CC.  Dario Badillo MD

## 2019-03-06 NOTE — PROGRESS NOTES
Pt given info on Keytruda and Joe Washburn, reviewed with Pt and wife, will get consent after MD decides which med to give. Chemo packet given, all questions answered.

## 2019-03-06 NOTE — PROGRESS NOTES
Yoav Davis a 75 y. o. 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 Completed chemo/Rads. Bone scan shows mets. CT guided needle bx done on 3/4/19. VS stable. Patient denies pain. Decreased appetite; stable weight noted. Patient denies N/V/D and constipation. Patient denies numbness and tingling at this time. Patient denies mouth ulcers. Patient denies cough. Patient reports SOB on excertion. Patient denies falls. Patient denies a H/A. Visit Vitals  /83 (BP 1 Location: Left arm, BP Patient Position: Sitting)   Pulse 79   Temp 97.4 °F (36.3 °C) (Oral)   Resp 18   Ht 5' 10\" (1.778 m)   Wt 133 lb 6.4 oz (60.5 kg)   SpO2 98%   BMI 19.14 kg/m²       Pain Scale: 0 - No pain/10  Pain Location:     1. Have you been to the ER, urgent care clinic since your last visit? Hospitalized since your last visit? No    2. Have you seen or consulted any other health care providers outside of the [address] [place] [address] since your last visit? Include any pap smears or colon screening. No     Health Maintenance Review: Patient reminded of \"due or due soon\" health maintenance. I have asked the patient to contact his/her primary care provider (PCP) for follow-up on his/her health maintenance.

## 2019-03-06 NOTE — PROGRESS NOTES
Oncology Navigator  Psychosocial Assessment    Reason for Assessment:    []Depression  []Anxiety  []Caregiver [place]  []Maladaptive Coping with Serious Illness   [x]Other:  Biopsy March 4     Sources of Information:    [x]Patient  []Family  []Staff  []Medical Record  Wife Magaly Morse (35 yrs)     Advance Care Planning:  No flowsheet data found.     Mental Status:    [x]Alert  []Lethargic  []Unresponsive  Oriented to:  [x]Person  [x]Place  [x]Time  [x]Situation      Barriers to Learning:    []Language  []Developmental  []Cognitive  []Altered Mental Status  []Visual/Hearing Impairment  []Unable to Read/Write  []Motivational   [x]No Barriers Identified  []Other:    Relationship Status:  []Single  [x]  []Significant Other/Life Partner  []  []  []      Living Circumstances:  []Lives Alone  [x]Family/Significant Other in Household  []Roommates  []Children in the Home  []Paid Caregivers  []Assisted Living Facility/Group Home  []Skilled [address]  []Homeless  []Incarcerated  []Environmental/Care Concerns  []Other:    Support System:    []Strong  [x]Fair  []Limited    Financial/Legal Concerns:    []Uninsured  []Limited Income/Resources  []Non-Citizen  [x]No Concerns Identified  []Financial POA:    []Other:    Gnosticist/Spiritual/Existential:  []Strong Sense of Spirituality  []Involved in [employer]  Wife attends Mos[employer]  []Request  Visit  []Expressing Brendalyn Hoops  [x]No Concerns Identified    Coping with Illness:         Patient: Family/Caregiver:   Understanding and Acceptance of Illness/Prognosis  [x] [x]   Strong Sense of Resilience [x] [x]   Self Reflection [] []   Engaged Support System [] []   Does not Readily Discuss Illness [] []   Denial of Terminal Status [] []   Anger [] []   Depression [] []   Anxiety/Fear [] []   Bargaining [] []   Recent Diagnosis/Prognosis [] []   Difficulties with Body Image [] []   Loss of Identity [] []   Excessive Substance Use [] []   Mental Health History [] []   Enmeshed Relationships [] []   History of Loss [x] []   Anticipatory Grief [] []   Concern for Complicated Grief [] []   Suicidal Ideation or Plan [] []   Unable to assess [] []                  Narrative: Pt  34 yrs - no children together, Pt has a dtr 48 and son who  at the age of 39 (heart issues-over weight)-not sure how long ago. Pt is anxious about results of biopsy. Pt worked as an  at [employer] retired. Referrals:     I. Transportation    Medicaid ([employer]) []   ACS Road to Recovery []                                    Regional organization  []                                      Financial Assistance/Medication Access    Patient assistance program (Care Card) []   Co-pay assistance  []                                    Leukemia & Lymphoma Society []   [address]  []   [facility] [address] []   [facility]  []     Emotional support    Peer support group []   Local counseling []                                    Online support group []   Coordination of psychiatry consult []     Goals/Plan: Continue psychosocial support as treatment plan is created.

## 2019-03-14 NOTE — H&P
Interventional and Vascular Radiology History and Physical    Patient: Antionette Ortega 75 y.o. male       Chief Complaint: No chief complaint on file. History of Present Illness: chemotherapy     History:    Past Medical History:   Diagnosis Date    Acquired hypothyroidism 3/5/2018    HTN (hypertension)     Hypercholesterolemia     Hyperlipidemia     Laryngeal squamous cell carcinoma ([facility] [address] 2016    Subclinical hypothyroidism      Family History   Problem Relation Age of Onset    Dementia Mother     Alcohol abuse Father      Social History     Socioeconomic History    Marital status:      Spouse name: Not on file    Number of children: Not on file    Years of education: Not on file    Highest education level: Not on file   Social Needs    Financial resource strain: Not on file    Food insecurity - worry: Not on file    Food insecurity - inability: Not on file   [employer] needs - medical: Not on file   [employer] needs - non-medical: Not on file   Occupational History    Not on file   Tobacco Use    Smoking status: Former Smoker    Smokeless tobacco: Never Used   Substance and Sexual Activity    Alcohol use: No    Drug use: No    Sexual activity: Yes     Partners: Female   Other Topics Concern    Not on file   Social History Narrative    Not on file       Allergies: Allergies   Allergen Reactions    Morphine Rash    Lisinopril Other (comments)     dizziness    Propranolol Other (comments)     fatigue       Current Medications:  Current Outpatient Medications   Medication Sig    ALPRAZolam (XANAX) 1 mg tablet TAKE ONE-HALF TO ONE TABLET BY MOUTH EVERY 12 HOURS AS NEEDED    levothyroxine (SYNTHROID) 50 mcg tablet Take 1 Tab by mouth Daily (before breakfast).  For thyroid    pravastatin (PRAVACHOL) 40 mg tablet TAKE ONE TABLET BY MOUTH ONCE DAILY FOR CHOLESTEROL    amLODIPine (NORVASC) 5 mg tablet TAKE ONE TABLET BY MOUTH ONCE DAILY FOR BLOOD PRESSURE     Current Facility-Administered Medications   Medication Dose Route Frequency    sodium bicarbonate (4%) (NEUT) injection 2 mL  2 mL SubCUTAneous ONCE    heparinized saline 2 units/mL infusion 800 Units  400 mL Irrigation ONCE        Physical Exam:  Blood pressure 107/54, pulse 67, temperature 97 °F (36.1 °C), resp. rate 16, height 5' 10\" (1.778 m), weight 60.3 kg (133 lb), SpO2 97 %. LUNG: clear to auscultation bilaterally, HEART: regular rate and rhythm, S1, S2 normal, no murmur, click, rub or gallop      Alerts:    Hospital Problems  Date Reviewed: 3/6/2019    None          Laboratory:    No results for input(s): HGB, HCT, WBC, PLT, INR, BUN, CREA, K, CRCLT, HGBEXT, HCTEXT, PLTEXT in the last 72 hours. No lab exists for component: PTT, PT, INREXT      Plan of Care/Planned Procedure:  Risks, benefits, and alternatives reviewed with patient and he agrees to proceed with the procedure. Conscious sedation will be performed with IV fentanyl and versed.  Plan is for chest port placement       Ida Herndon MD

## 2019-03-14 NOTE — ROUTINE PROCESS
0900- Pt in for port placement. Pt aware of risks and benefits and wishes to proceed. Wife at bedside. Discharge instructions reviewed with pt.,  Pt verbalized understanding. 5- Dr Sanjuana Zuniga in to review procedure with pt. Pt consented. 1030- Pt recovered. Breanna PO. Do D/I. Discharged to home with wife. Port book with pt.

## 2019-03-14 NOTE — DISCHARGE INSTRUCTIONS
[address] [facility]  Angiography Department      Radiologist:       Lian Quintana    Date:     3/14/19    Portacath Discharge Instructions      Watch for signs of infection:    1. Redness,   2. Fever, chills,   3. Increased pain, and/or drainage from the site. If this occurs, call your physician at once. Return next week for a [employer] check:       Do not register. Proceed to the Radiology Waiting Area and let the  know you are here for a \"PORT site check\". If you have an appointment with a provider or at the infusion center in the upcoming week,  they may check your site and change the dressing for you. Keep your dressing clean and dry. Leave the dressing in place until seen here next week. Continue your previous diet and restart your regularly prescribed medications. You may take Tylenol, as directed on the label, for pain if needed. Avoid ibuprofen (Advil, Motrin) and aspirin as they may cause you to bleed. Because you received sedation, you are not to drive or sign any legal documents for the next 24 hours. Do not lift anything heavier than 5 pounds with the affected arm and avoid pushing and pulling movements for several days. If you have any questions or concerns, please call our radiology department at [phone].

## 2019-03-21 NOTE — TELEPHONE ENCOUNTER
Marielos Carrillo from [facility] Pathology re:      has ordered a PDL 1 but the patient's specimen [id number] is insufficient    3/21/19  [place]

## 2019-03-27 NOTE — PROGRESS NOTES
Julianna Huggins a 75 y. o. 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 Completed chemo/Rads. Bone scan shows mets. CT guided needle bx done on 3/4/19. VS stable. Patient denies pain. Good appetite. Patient denies N/V/D and constipation. Patient denies numbness and tingling. Patient denies mouth ulcers. Patient denies cough. Patient reports SOB. Patient on exertion and a mild cough. Visit Vitals  /77 (BP 1 Location: Left arm, BP Patient Position: Sitting)   Pulse 75   Temp 97.7 °F (36.5 °C) (Oral)   Resp 18   Ht 5' 10\" (1.778 m)   Wt 138 lb 6.4 oz (62.8 kg)   SpO2 92%   BMI 19.86 kg/m²       Pain Scale: 0 - No pain/10  Pain Location:     1. Have you been to the ER, urgent care clinic since your last visit? Hospitalized since your last visit? No    2. Have you seen or consulted any other health care providers outside of the [address] [place] [address] since your last visit? Include any pap smears or colon screening. No     Health Maintenance Review: Patient reminded of \"due or due soon\" health maintenance. I have asked the patient to contact his/her primary care provider (PCP) for follow-up on his/her health maintenance.

## 2019-03-29 NOTE — PROGRESS NOTES
[address], [address] [place] [address] Cristino  [place], [address] [facility]  [phone]       Follow-up Note        Patient: Emigdio Sexton MRN: [id number]  SSN: [id number]    YOB: 1945  Age: 75 y.o. Sex: male        Diagnosis:     1. Metastatic adenocarcinoma of the lung   Multifocal disease in the bone    2. 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 Concurrent chemo/rads with cisplatin, completed 10/2016    Subjective:      Emigdio Sexton is a 75 y.o. male with a diagnosis of squamous cell laryngeal carcinoma. He was diagnosed by Dr. Alicia in July 2016. He has long history of pipe smoking. He quit all smoking more than 30 years ago. Mr. Nile Hutchins received concurrent radiation with weekly Cisplatin. CT shows resolving laryngeal mass. Laryngoscopy by Dr. Alicia on 1/19/2017 shows no tumor. Recent CT shows a questionable area in the spine. He had a NM bone scan which showed multiple lesions in the bone. Biopsy of the bone showed metastatic disease. He is here today with his wife to discuss treatment options.       Review of Systems:     Constitutional: negative  Eyes: negative  Ears, Nose, Mouth, Throat, and Face: dry mouth  Respiratory: negative  Cardiovascular: negative  Gastrointestinal: poor taste  Genitourinary:negative  Integument/Breast: negative  Hematologic/Lymphatic: negative  Musculoskeletal:negative  Neurological: negative      Past Medical History:   Diagnosis Date    Acquired hypothyroidism 3/5/2018    HTN (hypertension)     Hypercholesterolemia     Hyperlipidemia     Laryngeal squamous cell carcinoma ([facility] [address] 2016    Subclinical hypothyroidism      Past Surgical History:   Procedure Laterality Date    HX ORTHOPAEDIC      Back surgery x3    IR INSERT TUNL CVC W PORT OVER 5 YEARS  3/14/2019      Family History   Problem Relation Age of Onset    Dementia Mother     Alcohol abuse Father      Social History     Tobacco Use    Smoking status: Former Smoker    Smokeless tobacco: Never Used   Substance Use Topics    Alcohol use: No      Prior to Admission medications    Medication Sig Start Date End Date Taking? Authorizing Provider   lidocaine-prilocaine (EMLA) topical cream Apply  to affected area as needed for Pain. 3/27/19  Yes Laura Ward MD   ondansetron (ZOFRAN ODT) 4 mg disintegrating tablet Take 1 Tab by mouth every eight (8) hours as needed for Nausea. 3/27/19  Yes Laura Ward MD   prochlorperazine (COMPAZINE) 10 mg tablet Take 0.5 Tabs by mouth every six (6) hours as needed for Nausea for up to 7 days. 3/27/19 4/3/19 Yes Laura Ward MD   folic acid (FOLVITE) 1 mg tablet Take 1 Tab by mouth daily. 3/27/19  Yes Laura Ward MD   ALPRAZolam Lynette Craft) 1 mg tablet TAKE ONE-HALF TO ONE TABLET BY MOUTH EVERY 12 HOURS AS NEEDED 2/26/19  Yes Demi Lovell MD   levothyroxine (SYNTHROID) 50 mcg tablet Take 1 Tab by mouth Daily (before breakfast).  For thyroid 2/26/19  Yes Demi Lovell MD   pravastatin (PRAVACHOL) 40 mg tablet TAKE ONE TABLET BY MOUTH ONCE DAILY FOR CHOLESTEROL 2/26/19  Yes Demi Lovell MD   amLODIPine (NORVASC) 5 mg tablet TAKE ONE TABLET BY MOUTH ONCE DAILY FOR BLOOD PRESSURE 2/5/19  Yes Demi Lovell MD          Allergies   Allergen Reactions    Morphine Rash    Lisinopril Other (comments)     dizziness    Propranolol Other (comments)     fatigue           Objective:     Visit Vitals  /77 (BP 1 Location: Left arm, BP Patient Position: Sitting)   Pulse 75   Temp 97.7 °F (36.5 °C) (Oral)   Resp 18   Ht 5' 10\" (1.778 m)   Wt 138 lb 6.4 oz (62.8 kg)   SpO2 92%   BMI 19.86 kg/m²       Pain Scale: 0 - No pain/10  Pain Location:       Physical Exam:    GENERAL: alert, cooperative  HEENT: throat dry  LYMPHATIC: Cervical, supraclavicular, and axillary nodes normal.   THROAT & NECK: oral thrush  LUNG: clear to auscultation bilaterally  HEART: regular rate and rhythm  ABDOMEN: soft, non-tender  EXTREMITIES: no cyanosis or edema  SKIN: Normal.  NEUROLOGIC: negative      CT Results (most recent):  Results from Hospital Encounter encounter on 03/19/19   CT CHEST W CONT    Narrative EXAM:  CT CHEST W CONT, CT ABD PELV W CONT  INDICATION:   lung nodule. Laryngeal squamous cell carcinoma. Right iliac bone  metastases confirmed on CT-guided biopsy 3/4/2019. COMPARISON: CT chest 1/8/2019, 11/18/2016. 7/6/2017  . TECHNIQUE:   Multislice helical CT was performed from the thoracic inlet to the pubic  symphysis was performed with 100 CC Isovue intravenous contrast administration. Oral contrast was not administered. Contiguous 5 mm axial images were  reconstructed and lung and soft tissue windows were generated. Coronal and  sagittal reformations were generated. CT dose reduction was achieved through the use of a standardized protocol  tailored for this examination and automatic exposure control for dose  modulation. [facility] FINDINGS:  CHEST:  Thyroid: Unremarkable. .  Mediastinum/ayaka: No pathologically enlarged mediastinal or hilar). Prominent  pretracheal nodes are unchanged from prior. On image 2-26 these measure up to  0.9 cm in short axis. Small hiatal hernia. Gautam Jo-Ann Heart/vessels: IJ port terminates at the cavoatrial junction. Mild  atherosclerotic disease of the aorta and its branches. No thoracic aortic  aneurysm. Moderate coronary artery calcifications. The heart is not enlarged. No  pericardial effusion. .  Lungs/Pleura: Severe underlying emphysema with scarring complicated evaluation  of the lung parenchyma. In the anterior right upper lobe there is an area of  consolidation with volume loss on image 4-26 which appears unchanged dating back  to 7/19/2018. The area appears more solid when compared to 7/6/2017.  Mild  groundglass opacities with scarring along the right upper and lower lobes, image  4-44 do not appear significantly changed from 7/19/2018. Unchanged 0.9 cm  lingular nodule, image 4-53 dating back to 7/6/2017. Small nodules along the  right major fissure, image 4-38 are unchanged from 2017. Increased volume of a  small right effusion. .   Bones and soft tissues: Numerous sclerotic lesions throughout the spine and  within the sternum which do not appear significantly changed from prior. No  vertebral body height collapse. ABDOMEN:  Liver: Unremarkable. No focal lesion. .  Gallbladder/Biliary: Unremarkable. No cholelithiasis or biliary dilation. Spleen: No splenomegaly or suspicious lesion. .  Pancreas: Unremarkable. .  Adrenals: Unremarkable. No nodule. .  Kidneys: Symmetric enhancement. No suspicious mass, hydronephrosis, or  nephrolithiasis. .  Ureters: Unremarkable. .  Bladder: Unremarkable. .  Gastrointestinal tract: No evidence of obstruction or focal inflammation. The  appendix is not well-visualized. .  Peritoneum: No ascites or pneumoperitoneum. Benna Him Retroperitoneum: Moderate atherosclerotic disease of the abdominal aorta and its  branches. No aneurysmal dilation. No lymphadenopathy. Reproductive System: Prostatomegaly. .  Bones and soft tissues: There are numerous sclerotic lesions throughout the  spine and pelvis. These appear comparable to the prior MR pelvis. .        Impression IMPRESSION:  Chest:  1. Severe underlying emphysema with scarring and bilateral lung  nodules/scarring. No significant change dating back to July 2018. Continued  attention on follow-up. 2.  No significant change in osseous metastatic disease. 3.  Increased small right effusion. Abdomen and pelvis:  1. Extensive osseous metastatic disease involving the spine and pelvis,  comparable to prior studies. No vertebral body height collapse. MRI Results (most recent):  Results from [place] encounter on 02/21/19   MRI LUMB SPINE W WO CONT    Narrative EXAM: MRI LUMB SPINE W WO CONT    INDICATION: Abnormal bone scan.  Malignant neoplasm of larynx, squamous cell  carcinoma C 32.9. R 94.8. Back pain. COMPARISON: Radiographs 1/23/2013, bone scan 1/21/2019    TECHNIQUE: MR imaging of the lumbar spine was performed using the following  sequences: sagittal T1, T2, STIR;  axial T1, T2 prior to and following contrast  administration. CONTRAST: 13 mL of Dotarem. FINDINGS:    Conus position, morphology, signal and enhancement are normal. Vertebral body  heights are normally maintained. There are several areas of abnormal bone signal consistent with osseous  metastatic disease. These include the anterior T12 vertebral body measuring 10  mm and 3 mm, the right anteroinferior L1 vertebral body measuring 5 mm, the mid  anteroinferior L2 vertebral body measuring 6 mm, the left posterior L3 vertebral  body measuring 12 mm and 6 mm, the posterior L5 vertebral body measuring 4 mm,  the inferior L5 vertebral body measuring 12 mm, at the visualized superior right  sacral wing measuring 9 mm, the left sacral wing measuring 6 mm, and the left  posterior iliac bone measuring 9 mm and 6 mm. There is L2-3 retrolisthesis measuring 5 mm and L3-4 retrolisthesis measuring 2  to 3 mm. There is a mild levoconvex lumbar curve centered at L3-4. No paraspinal soft tissue mass or enhancement abnormality is shown. Ankylosis of  the visualized superior right SI joint is shown with bridging osteophyte  formation. Small left SI joint osteophytes are also noted. T12-L1: Normal disc and facets. L1-L2: Mild disc space narrowing and diffuse disc bulging. Mild bilateral facet  osteoarthrosis. No canal or foraminal stenosis. L2-L3: Moderate to severe disc space narrowing with mild to moderate diffuse  disc bulging and bilateral facet osteoarthrosis. Mild canal stenosis and right  foraminal stenosis. L3-L4: Moderate disc space narrowing and diffuse disc bulging. Mild to moderate  bilateral facet osteoarthrosis. Moderate canal stenosis.  Mild to moderate right  and mild left foraminal stenosis. L4-L5: Moderate to severe disc space narrowing. Mild diffuse disc bulging. Mild  to moderate bilateral facet osteoarthrosis. Mild to moderate canal stenosis. Mild to moderate right foraminal stenosis. L5-S1: Moderate to severe disc space narrowing. Mild to moderate diffuse disc  bulging. Mild bilateral facet osteoarthrosis. No canal stenosis. Moderate left  foraminal stenosis. Impression IMPRESSION:    1. Multifocal osseous metastatic disease with lesion size measuring up to 12 mm. 2. Multilevel degenerative changes detailed by level above. Note L3-4 moderate  canal stenosis. I reviewed the images personally. Abnormal sclerotic areas in multiple vertebrae        Assessment:     1. Metastatic adenocarcinoma of the lung    ECOG PS 1  Intent of Treatment - palliative  Prognosis - poor    I spent 65 minute with the patient in a face-to-face encounter. I explained him the stage of the disease, pathophysiology of the disease and the treatment approaches. I answered all her questions. More than 50% of the time was utilized in education, counseling and co-ordination of care. Although CT chest does not a clear tumor, I suspect he has small tumor hidden in the background of emphysematous changes and parenchymal scarring. The standard of care for the treatment of stage IV adenocarcinoma of the lung is Carboplatin + Alimta + Pembrolizumab. I recommended this treatment to him. In the pivotal KEYNOTE-189 phase III RCT, after a median follow-up of 10.5 months, the estimated rate of overall survival at 12 months was 69.2% (95% confidence interval [CI], 64.1 to 73.8) in the pembrolizumab-combination group versus 49.4% (95% CI, 42.1 to 56.2) in the placebo-combination group (hazard ratio for death, 0.49; 95% CI, 0.38 to 0.64; P<0.001). Improvement in overall survival was seen across all PD-L1 categories that were evaluated.  Median progression-free survival was 8.8 months (95% CI, 7.6 to 9.2) in the pembrolizumab-combination group and 4.9 months (95% CI, 4.7 to 5.5) in the placebo-combination group (hazard ratio for disease progression or death, 0.52; 95% CI, 0.43 to 0.64; P<0.001). Adverse events of grade 3 or higher occurred in 67.2% of the patients in the pembrolizumab-combination group and in 65.8% of those in the placebo-combination group. I counseled the patient regarding the chemotherapy. Discussions included side-effect, toxicity, benefit and risks of chemotherapy. He understood the expected side-effect which includes fatigue, anemia (seven [12%] of 59) and decreased neutrophil count (three [5%]); an additional six events each occurred in two (3%) for acute kidney injury, decreased lymphocyte count, neutropenia, and sepsis, and thrombocytopenia. After weighing the benefit and risks, he agreed to proceed with chemotherapy. He understands that there is no alternative to this treatment is no treatment or single agent Alimta. I counseled the patient regarding the immunotherapy. Discussions included side-effect, toxicity, benefit and risks of chemotherapy. He understood the expected side-effect which includes fatigue and various immune related side effects such as colitis, pneumonitis, hypophysitis, arthritis among other things. After weighing the benefit and risks, he agreed to proceed with chemotherapy. He understands that the alternative to this treatment is observation alone. The duration of this treatment plan will be until progression, intolerable side effects, or patient choice. Patient will be meeting with navigation services to discuss any financial barriers to care/estimated cost of care. We will plan to see the patient in follow up at least once per cycle, or sooner if symptoms warrant.        2. 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 Protein calorie malnutrition, severe    Dietician consult  Protein supplementation      Plan:       > Start Carbo/Pem/Pembrolizumab  > Liquid tumor NGS testing  > Start Xgeva monthly  > Follow-up at start of treatment        Signed by: Ayan Barrera MD                     March 28, 2019        CC. Gris Vanegas MD ([facility])  CC. Bud Flores MD  CC.  Angelito Garcia MD

## 2019-04-08 NOTE — PROGRESS NOTES
[address] [facility], [address] [facility]   W: [phone]  F: [phone]    Medical Nutrition Therapy      Reason for nutrition visit:   Met with patient and wife. He is eating and drinking well. Weight stable over the past year. Drinking 2-3 Boost high protein shakes daily. Results:   Squamous cell carcinoma of the larynx completed concurrent chemorads with cisplatin back in 10/2016. Metastatic adenocarcinoma of the lung with disease in the bone plan for carbo/pem/pembrolizumab     Wt Readings from Last 6 Encounters:   04/08/19 137 lb 8 oz (62.4 kg)   04/08/19 137 lb 8 oz (62.4 kg)   03/27/19 138 lb 6.4 oz (62.8 kg)   03/14/19 133 lb (60.3 kg)   03/06/19 133 lb 6.4 oz (60.5 kg)   03/04/19 134 lb (60.8 kg)       Estimated Nutrition Needs:   Calorie Range: 1860-2170kcal/day     Protein Range: 62-72g/day      Fluid Needs: 2000ml     Assessment:   Inadequate oral food or beverage intake (potential) related to chemotherapy as evidence by treatment side effects. Plan:   - Continue to be a resource for any nutrition related questions or concerns. - Continue with snacks between meals.   - Continue with Boost supplements 2-3 times a day. I appreciate the opportunity to participate in Mr. Jennifer Cardenas care.     Signed By: Harish Leone, [address], [address] [place] [address] , [address]     Contact: [phone]

## 2019-04-08 NOTE — PROGRESS NOTES
Tone García a 75 y. o. male here today for metastastic lung to bone orginially from  [facility] Larynx f/u. Completed chemo/Rads 10/16. Bone scan shows mets. CT guided needle bx done on 3/4/19. Patient starting Carbo/Alimta/Keytruda today. VS stable. Patient denies pain. Good appetite. Patient denies N/V/D and constipation. Patient denies numbness and tingling. Patient denies mouth ulcers. Patient denies cough. Patient reports SOB on excertion. Patient reports fatigue. Visit Vitals  /78 (BP 1 Location: Left arm, BP Patient Position: Sitting)   Pulse 64   Temp 97.8 °F (36.6 °C) (Oral)   Resp 18   Ht 5' 10\" (1.778 m)   Wt 137 lb 8 oz (62.4 kg)   SpO2 94%   BMI 19.73 kg/m²       Pain Scale: 0 - No pain/10  Pain Location:     1. Have you been to the ER, urgent care clinic since your last visit? Hospitalized since your last visit? No    2. Have you seen or consulted any other health care providers outside of the [address] [place] [address] since your last visit? Include any pap smears or colon screening. No     Health Maintenance Review: Patient reminded of \"due or due soon\" health maintenance. I have asked the patient to contact his/her primary care provider (PCP) for follow-up on his/her health maintenance.

## 2019-04-08 NOTE — DISCHARGE INSTRUCTIONS
OUTPATIENT INFUSION CENTER    DISCHARGE INSTRUCTIONS FOR:  CHEMOTHERAPY / BIOTHERAPY    Chemotherapy has the potential to cause many side effects. The following are general precautions that chemo patients should take:    1. Practice good hand washing:   * Use soap and water for at least 15 seconds, covering all areas of hands. * Always wash hands before eating. * Wash hands after contact with public surfaces such as door knobs and         handles, shopping carts, telephones and elevator buttons. 2. Get plenty of rest:    * You will likely experience fatigue three to five days following your treatment. It may last as long as seven days. 3. Drink plenty of fluids. Water is best.    4. Eat a well balanced diet:  * Small frequent meals may help if you are having trouble with nausea or your  appetite. Some people also do well with nutritional supplements. 5. Pace yourself with daily activities:   * Take frequent breaks and ask for help if you need it. 6. Exercise is very important:  * It will increase circulation and will help the fatigue. Do what you can each day. 7. If your regimen results in hair loss:  *  You will likely notice effects between two and three weeks following your first treatment. Some lose all hair while others only experience thinning. 8. Practice good oral hygiene:   *  Notify your M.D. immediately if any mouth sores or discomfort develop. 9. Protect yourself from the sun. Signs/Symptoms of an allergic reaction and/or some side effects may require immediate medical attention. Notify your physician if you develop one or more of the following:     Temperature of 100.5 degrees or greater;   Skin redness, itching, swelling, blistering, weeping, crusting, rash, or hives;    Wheezing, chest tightness, cough, or shortness of breath;   Swelling of the face, eyelids, lips, tongue, or throat;  Severe, persistent headache;  Stuffy nose, runny nose, sneezing;   Red (bloodshot), itchy, swollen, or watery eyes;   Stomach  pain, nausea, vomiting, diarrhea, or bloody stools;  Mouth sores        Your physician should also be aware of the following symptoms:    Persistent and unresolved nausea and/or vomiting;   Persistent and unresolved diarrhea or constipation;   Numbness/tingling/burning of the extremities, including the fingers and toes; Bleeding or unexplained bruising; Unexplained redness/swelling/pain in the arms or legs; Shortness of breath or fatigue that worsens;   Pain with urination or blood in the urine; Chills;  Cough, especially a productive cough;  Mouth sores or a white coating of the tongue; Redness, swelling, pain or drainage at the port-a-cath or IV site; Increased feeling of bloating or water retention; Excessive weight loss or gain;  Ringing in the ears; Difficulty swallowing;  Dizziness, vertigo, lightheadedness or fainting. Reyna Rameyhold Signature: ____________________________ 4/8/2019  Brenda Hubbard RN       OUTPATIENT INFUSION CENTER    DISCHARGE INSTRUCTIONS FOR:  ZOMETA (Zoledronic Acid):    1. Be sure to inform your Dentist that you are taking this drug prior to invasive dental   procedures. You should avoid major dental work while you are being treated with this     medicine. Report any signs/symptoms of jaw pain to your physician immediately. 2.  This medicine needs to be taken on a fixed schedule. If you miss a dose, make sure your doctor is informed. You will also need to have frequent lab work done; try to keep all of your appointments. Your doctor may also prescribe Vitamin D and calcium supplements. 3.  Make sure your doctor knows if you are taking fluid pills, arthritis medicines or antibiotics,  or if you have a history of problems with your gums, mouth or teeth. 4. Drink some extra fluids during the 12-hour period before and after you receive Zometa.   Report any changes in urinary patterns (example: a decrease in how often you urinate). Also report rapid weight gain, swelling of the hands, ankles or feet, unusual tiredness or weakness or unusual bleeding or bruising. 5.  You may resume your normal activities after your infusion. Some people may have mild side effects from Zometa. These side effects usually improve after the first infusion. They may include:  Mild nausea, diarrhea, constipation or upset stomach; Red or irritated eyes;  redness or itching at IV site;  Mild skin rash, mild numbness, tingling, or burning in extremities; Headache, dizziness, mild muscle or joint pain, trouble sleeping;  Nasal congestion, runny nose, sneezing    6. Signs/Symptoms of an allergic reaction may require immediate medical attention. These are rare, but may include one or more of the following:     Skin - Swelling, blistering, weeping, crusting, rash, itching or; Wheezing, cough, sore throat, chest tightness, chest pain or shortness of breath, irregular heart beat;  Swelling of the face, eyelids, lips, tongue, or throat; dry mouth; Severe red (bloodshot), itchy, swollen, watery or painful eyes;  Stomach pain, loss of appetite, nausea, vomiting, or bloody diarrhea;  Severe headache, sleepiness or changes in personality;  Numbness, tingling or pain around the mouth, teeth, or jaw. Contact your physician if you have questions or concerns, or experience any of the above symptoms.     Dinah José, Signature: _______________________________ 4/8/2019  Pb Cleaning RN

## 2019-04-08 NOTE — PROGRESS NOTES
[facility] - Chemotherapy Progress Note    0805- Pt admit to [facility] for C1 Carboplatin/Alimta/Keytruda ambulatory in stable condition. Assessment completed. Pt c/o hoarseness to throat and SOB with exertion. Pt denies any recent or upcoming invasive dental work. Right chest port accessed without issue with positive blood return. Labs drawn per order and sent. Line flushed, clamped, Curos Cap applied to end clave. Pt over to MD office. Visit Vitals  /69 (BP 1 Location: Left arm, BP Patient Position: Sitting)   Pulse 70   Temp 96.7 °F (35.9 °C)   Resp 18   Ht 5' 10\" (1.778 m)   Wt 62.4 kg (137 lb 8 oz)   SpO2 94%   BMI 19.73 kg/m²     Recent Results (from the past 12 hour(s))   CBC WITH AUTOMATED DIFF    Collection Time: 04/08/19  8:10 AM   Result Value Ref Range    WBC 11.0 4.1 - 11.1 K/uL    RBC 4.39 4. 10 - 5.70 M/uL    HGB 13.6 12.1 - 17.0 g/dL    HCT 39.3 36.6 - 50.3 %    MCV 89.5 80.0 - 99.0 FL    MCH 31.0 26.0 - 34.0 PG    MCHC 34.6 30.0 - 36.5 g/dL    RDW 13.2 11.5 - 14.5 %    PLATELET 204 621 - 949 K/uL    MPV 10.1 8.9 - 12.9 FL    NRBC 0.0 0  WBC    ABSOLUTE NRBC 0.00 0.00 - 0.01 K/uL    NEUTROPHILS 80 (H) 32 - 75 %    LYMPHOCYTES 9 (L) 12 - 49 %    MONOCYTES 7 5 - 13 %    EOSINOPHILS 4 0 - 7 %    BASOPHILS 0 0 - 1 %    IMMATURE GRANULOCYTES 0 0.0 - 0.5 %    ABS. NEUTROPHILS 8.8 (H) 1.8 - 8.0 K/UL    ABS. LYMPHOCYTES 1.0 0.8 - 3.5 K/UL    ABS. MONOCYTES 0.7 0.0 - 1.0 K/UL    ABS. EOSINOPHILS 0.5 (H) 0.0 - 0.4 K/UL    ABS. BASOPHILS 0.0 0.0 - 0.1 K/UL    ABS. IMM.  GRANS. 0.0 0.00 - 0.04 K/UL    DF AUTOMATED     METABOLIC PANEL, COMPREHENSIVE    Collection Time: 04/08/19  8:10 AM   Result Value Ref Range    Sodium 134 (L) 136 - 145 mmol/L    Potassium 3.7 3.5 - 5.1 mmol/L    Chloride 103 97 - 108 mmol/L    CO2 26 21 - 32 mmol/L    Anion gap 5 5 - 15 mmol/L    Glucose 115 (H) 65 - 100 mg/dL    BUN 14 6 - 20 MG/DL    Creatinine 0.71 0.70 - 1.30 MG/DL    BUN/Creatinine ratio 20 12 - 20      GFR est AA >60 >60 ml/min/1.73m2    GFR est non-AA >60 >60 ml/min/1.73m2    Calcium 8.9 8.5 - 10.1 MG/DL    Bilirubin, total 0.3 0.2 - 1.0 MG/DL    ALT (SGPT) 19 12 - 78 U/L    AST (SGOT) 20 15 - 37 U/L    Alk. phosphatase 80 45 - 117 U/L    Protein, total 7.4 6.4 - 8.2 g/dL    Albumin 3.5 3.5 - 5.0 g/dL    Globulin 3.9 2.0 - 4.0 g/dL    A-G Ratio 0.9 (L) 1.1 - 2.2     TSH 3RD GENERATION    Collection Time: 04/08/19  8:10 AM   Result Value Ref Range    TSH 3.35 0.36 - 3.74 uIU/mL     Medications:  NS KVO  Zometa IV  Vitamin B12 IM injection to left deltoid  Zofran IVP  Emend IV  Keytruda IV   Alimta IV  Carboplatin IV    Visit Vitals  /66   Pulse (!) 57   Temp 96.7 °F (35.9 °C)   Resp 18   Ht 5' 10\" (1.778 m)   Wt 62.4 kg (137 lb 8 oz)   SpO2 94%   BMI 19.73 kg/m²     Pt tolerated treatment well. Port maintained positive blood return throughout treatment, flushed with positive blood return at conclusion, and de-accessed. Chemo discharge instructions and AVS reviewed with pt. Copies provided, pt verbalized understanding. 1300- D/c home ambulatory in no distress accompanied by spouse.  Pt aware of next [facility] appointment scheduled for 4/29 at 8 AM.

## 2019-04-08 NOTE — PROGRESS NOTES
[address] [facility]  [address], [address] [facility], [facility] [place], [address]  [phone]       Follow-up Note        Patient: Iban Contreras MRN: [id number]  SSN: [id number]    YOB: 1945  Age: 75 y.o. Sex: male        Diagnosis:     1. Metastatic adenocarcinoma of the lung   Multifocal disease in the bone    2. 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 Palliative chemotherapy   Carboplatin, Alimta and Pembrolizumab cycle 1 day 1   2. Concurrent chemo/rads with cisplatin, completed 10/2016    Subjective:      Iban Contreras is a 75 y.o. male with a diagnosis of squamous cell laryngeal carcinoma. He was diagnosed by Dr. Alicia in July 2016. He has long history of pipe smoking. He quit all smoking more than 30 years ago. Mr. Jasmine Bradshaw received concurrent radiation with weekly Cisplatin. CT shows resolving laryngeal mass. Laryngoscopy by Dr. Alicia on 1/19/2017 shows no tumor. Recent CT shows a questionable area in the spine. He had a NM bone scan which showed multiple lesions in the bone. Biopsy of the bone showed metastatic disease of lung primary. The tissue is insufficient for biomarker testing. He is here today to start Carboplatin, Alimta and Pembrolizumab. He is eating well and drinks 2-3 boost per day.        Review of Systems:     Constitutional: negative  Eyes: negative  Ears, Nose, Mouth, Throat, and Face: dry mouth  Respiratory: negative  Cardiovascular: negative  Gastrointestinal: poor taste  Genitourinary:negative  Integument/Breast: negative  Hematologic/Lymphatic: negative  Musculoskeletal:negative  Neurological: negative      Past Medical History:   Diagnosis Date    Acquired hypothyroidism 3/5/2018    HTN (hypertension)     Hypercholesterolemia     Hyperlipidemia     Laryngeal squamous cell carcinoma ([facility] [address] 2016    Subclinical hypothyroidism      Past Surgical History:   Procedure Laterality Date    HX ORTHOPAEDIC      Back surgery x3    IR INSERT TUNL CVC W PORT OVER 5 YEARS  3/14/2019      Family History   Problem Relation Age of Onset    Dementia Mother     Alcohol abuse Father      Social History     Tobacco Use    Smoking status: Former Smoker    Smokeless tobacco: Never Used   Substance Use Topics    Alcohol use: No      Prior to Admission medications    Medication Sig Start Date End Date Taking? Authorizing Provider   lidocaine-prilocaine (EMLA) topical cream Apply  to affected area as needed for Pain. 3/27/19   Rivera Norris MD   ondansetron (ZOFRAN ODT) 4 mg disintegrating tablet Take 1 Tab by mouth every eight (8) hours as needed for Nausea. 3/27/19   Rivera Norris MD   folic acid (FOLVITE) 1 mg tablet Take 1 Tab by mouth daily. 3/27/19   Rivera Norris MD   ALPRAZolam Guadalupe Allis) 1 mg tablet TAKE ONE-HALF TO ONE TABLET BY MOUTH EVERY 12 HOURS AS NEEDED 2/26/19   Leana Saha MD   levothyroxine (SYNTHROID) 50 mcg tablet Take 1 Tab by mouth Daily (before breakfast). For thyroid 2/26/19   Leana Saha MD   pravastatin (PRAVACHOL) 40 mg tablet TAKE ONE TABLET BY MOUTH ONCE DAILY FOR CHOLESTEROL 2/26/19   Leana Saha MD   amLODIPine (NORVASC) 5 mg tablet TAKE ONE TABLET BY MOUTH ONCE DAILY FOR BLOOD PRESSURE 2/5/19   Leana Saha MD          Allergies   Allergen Reactions    Morphine Rash    Lisinopril Other (comments)     dizziness    Propranolol Other (comments)     fatigue           Objective:      Wt Readings from Last 3 Encounters:   04/08/19 137 lb 8 oz (62.4 kg)   04/08/19 137 lb 8 oz (62.4 kg)   03/27/19 138 lb 6.4 oz (62.8 kg)     Temp Readings from Last 3 Encounters:   04/08/19 97.8 °F (36.6 °C) (Oral)   04/08/19 96.7 °F (35.9 °C)   03/27/19 97.7 °F (36.5 °C) (Oral)     BP Readings from Last 3 Encounters:   04/08/19 133/78   04/08/19 125/66   03/27/19 154/77     Pulse Readings from Last 3 Encounters:   04/08/19 64 04/08/19 (!) 57   03/27/19 75       Pain Scale: /10  Pain Location:       Physical Exam:    GENERAL: alert, cooperative  HEENT: throat dry  LYMPHATIC: Cervical, supraclavicular, and axillary nodes normal.   THROAT & NECK: dry oral mucosa  LUNG: clear to auscultation bilaterally  HEART: regular rate and rhythm  ABDOMEN: soft, non-tender  EXTREMITIES: no cyanosis or edema  SKIN: Normal.  NEUROLOGIC: negative      Lab Results   Component Value Date/Time    WBC 11.0 04/08/2019 08:10 AM    Hemoglobin (POC) 9.5 (L) 10/18/2016 10:31 AM    HGB 13.6 04/08/2019 08:10 AM    Hematocrit (POC) 28 (L) 10/18/2016 10:31 AM    HCT 39.3 04/08/2019 08:10 AM    PLATELET 387 56/47/2118 08:10 AM    MCV 89.5 04/08/2019 08:10 AM       Lab Results   Component Value Date/Time    Sodium 134 (L) 04/08/2019 08:10 AM    Potassium 3.7 04/08/2019 08:10 AM    Chloride 103 04/08/2019 08:10 AM    CO2 26 04/08/2019 08:10 AM    Anion gap 5 04/08/2019 08:10 AM    Glucose 115 (H) 04/08/2019 08:10 AM    BUN 14 04/08/2019 08:10 AM    Creatinine 0.71 04/08/2019 08:10 AM    BUN/Creatinine ratio 20 04/08/2019 08:10 AM    GFR est AA >60 04/08/2019 08:10 AM    GFR est non-AA >60 04/08/2019 08:10 AM    Calcium 8.9 04/08/2019 08:10 AM    Bilirubin, total 0.3 04/08/2019 08:10 AM    AST (SGOT) 20 04/08/2019 08:10 AM    Alk. phosphatase 80 04/08/2019 08:10 AM    Protein, total 7.4 04/08/2019 08:10 AM    Albumin 3.5 04/08/2019 08:10 AM    Globulin 3.9 04/08/2019 08:10 AM    A-G Ratio 0.9 (L) 04/08/2019 08:10 AM    ALT (SGPT) 19 04/08/2019 08:10 AM           CT Results (most recent):  Results from [place] encounter on 03/19/19   CT CHEST W CONT    Narrative EXAM:  CT CHEST W CONT, CT ABD PELV W CONT  INDICATION:   lung nodule. Laryngeal squamous cell carcinoma. Right iliac bone  metastases confirmed on CT-guided biopsy 3/4/2019. COMPARISON: CT chest 1/8/2019, 11/18/2016. 7/6/2017  .   TECHNIQUE:   Multislice helical CT was performed from the thoracic inlet to the pubic  symphysis was performed with 100 CC Isovue intravenous contrast administration. Oral contrast was not administered. Contiguous 5 mm axial images were  reconstructed and lung and soft tissue windows were generated. Coronal and  sagittal reformations were generated. CT dose reduction was achieved through the use of a standardized protocol  tailored for this examination and automatic exposure control for dose  modulation. Bonnie Long FINDINGS:  CHEST:  Thyroid: Unremarkable. .  Mediastinum/ayaka: No pathologically enlarged mediastinal or hilar). Prominent  pretracheal nodes are unchanged from prior. On image 2-26 these measure up to  0.9 cm in short axis. Small hiatal hernia. Bonnie Long Heart/vessels: IJ port terminates at the cavoatrial junction. Mild  atherosclerotic disease of the aorta and its branches. No thoracic aortic  aneurysm. Moderate coronary artery calcifications. The heart is not enlarged. No  pericardial effusion. .  Lungs/Pleura: Severe underlying emphysema with scarring complicated evaluation  of the lung parenchyma. In the anterior right upper lobe there is an area of  consolidation with volume loss on image 4-26 which appears unchanged dating back  to 7/19/2018. The area appears more solid when compared to 7/6/2017. Mild  groundglass opacities with scarring along the right upper and lower lobes, image  4-44 do not appear significantly changed from 7/19/2018. Unchanged 0.9 cm  lingular nodule, image 4-53 dating back to 7/6/2017. Small nodules along the  right major fissure, image 4-38 are unchanged from 2017. Increased volume of a  small right effusion. .   Bones and soft tissues: Numerous sclerotic lesions throughout the spine and  within the sternum which do not appear significantly changed from prior. No  vertebral body height collapse. ABDOMEN:  Liver: Unremarkable. No focal lesion. .  Gallbladder/Biliary: Unremarkable. No cholelithiasis or biliary dilation.   Spleen: No splenomegaly or suspicious lesion. .  Pancreas: Unremarkable. .  Adrenals: Unremarkable. No nodule. .  Kidneys: Symmetric enhancement. No suspicious mass, hydronephrosis, or  nephrolithiasis. .  Ureters: Unremarkable. .  Bladder: Unremarkable. .  Gastrointestinal tract: No evidence of obstruction or focal inflammation. The  appendix is not well-visualized. .  Peritoneum: No ascites or pneumoperitoneum. Jovanna Martin Retroperitoneum: Moderate atherosclerotic disease of the abdominal aorta and its  branches. No aneurysmal dilation. No lymphadenopathy. Reproductive System: Prostatomegaly. .  Bones and soft tissues: There are numerous sclerotic lesions throughout the  spine and pelvis. These appear comparable to the prior MR pelvis. .        Impression IMPRESSION:  Chest:  1. Severe underlying emphysema with scarring and bilateral lung  nodules/scarring. No significant change dating back to July 2018. Continued  attention on follow-up. 2.  No significant change in osseous metastatic disease. 3.  Increased small right effusion. Abdomen and pelvis:  1. Extensive osseous metastatic disease involving the spine and pelvis,  comparable to prior studies. No vertebral body height collapse. MRI Results (most recent):  Results from [place] encounter on 02/21/19   MRI LUMB SPINE W WO CONT    Narrative EXAM: MRI LUMB SPINE W WO CONT    INDICATION: Abnormal bone scan. Malignant neoplasm of larynx, squamous cell  carcinoma C 32.9. R 94.8. Back pain. COMPARISON: Radiographs 1/23/2013, bone scan 1/21/2019    TECHNIQUE: MR imaging of the lumbar spine was performed using the following  sequences: sagittal T1, T2, STIR;  axial T1, T2 prior to and following contrast  administration. CONTRAST: 13 mL of Dotarem. FINDINGS:    Conus position, morphology, signal and enhancement are normal. Vertebral body  heights are normally maintained. There are several areas of abnormal bone signal consistent with osseous  metastatic disease.  These include the anterior T12 vertebral body measuring 10  mm and 3 mm, the right anteroinferior L1 vertebral body measuring 5 mm, the mid  anteroinferior L2 vertebral body measuring 6 mm, the left posterior L3 vertebral  body measuring 12 mm and 6 mm, the posterior L5 vertebral body measuring 4 mm,  the inferior L5 vertebral body measuring 12 mm, at the visualized superior right  sacral wing measuring 9 mm, the left sacral wing measuring 6 mm, and the left  posterior iliac bone measuring 9 mm and 6 mm. There is L2-3 retrolisthesis measuring 5 mm and L3-4 retrolisthesis measuring 2  to 3 mm. There is a mild levoconvex lumbar curve centered at L3-4. No paraspinal soft tissue mass or enhancement abnormality is shown. Ankylosis of  the visualized superior right SI joint is shown with bridging osteophyte  formation. Small left SI joint osteophytes are also noted. T12-L1: Normal disc and facets. L1-L2: Mild disc space narrowing and diffuse disc bulging. Mild bilateral facet  osteoarthrosis. No canal or foraminal stenosis. L2-L3: Moderate to severe disc space narrowing with mild to moderate diffuse  disc bulging and bilateral facet osteoarthrosis. Mild canal stenosis and right  foraminal stenosis. L3-L4: Moderate disc space narrowing and diffuse disc bulging. Mild to moderate  bilateral facet osteoarthrosis. Moderate canal stenosis. Mild to moderate right  and mild left foraminal stenosis. L4-L5: Moderate to severe disc space narrowing. Mild diffuse disc bulging. Mild  to moderate bilateral facet osteoarthrosis. Mild to moderate canal stenosis. Mild to moderate right foraminal stenosis. L5-S1: Moderate to severe disc space narrowing. Mild to moderate diffuse disc  bulging. Mild bilateral facet osteoarthrosis. No canal stenosis. Moderate left  foraminal stenosis. Impression IMPRESSION:    1. Multifocal osseous metastatic disease with lesion size measuring up to 12 mm.   2. Multilevel degenerative changes detailed by level above. Note L3-4 moderate  canal stenosis. I reviewed the images personally. Abnormal sclerotic areas in multiple vertebrae        Assessment:     1. Metastatic adenocarcinoma of the lung   Numerous bone metastasis    ECOG PS 1  Intent of Treatment - palliative  Prognosis - poor    The tissue from bone is insufficient for biomarker testing. No clear primary in the lung  Starting Carboplatin, Alimta, Pembrolizumab - cycle 1 day 1    I educated him about the potential side effects of the treatment and ways to manage them. A detailed system by system evaluation of side effect was performed to assess chemotherapy related toxicity. Blood counts are acceptable. Results reviewed with the patient. Symptom management form reviewed and scanned into the EMR under Media. 2. 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 Protein calorie malnutrition, severe    Dietician consult  Protein supplementation      Plan:       > Start Carbo/Pem/Pembrolizumab  > Liquid tumor NGS testing  > Start Xgeva monthly  > Follow-up in 3 weeks  > Refer to Palliative care         Signed by: Antonino Alfaro MD                     April 8, 2019        CC. Edwina Mckinney MD ([facility])  CC. Terrence Hathaway MD  CC.  Tamiko Leonard MD

## 2019-04-22 NOTE — PROGRESS NOTES
Palliative Medicine Outpatient Services [place]: [phone] Patient Name: Ivette Infante YOB: 1945 Date of Current Visit: 04/22/19 Location of Current Visit:   
[] [facility] Office 
[] [facility] Office [x] [facility] Office 
[] Home 
[] Other:   
 
Date of Initial Visit: 4/22/2019 Referral from: Dr. Paloma Santos Date of Initial Referral: 4/11/2019 Primary Care Physician: Mariah Rhodes MD 
  
 SUMMARY:  
Ivette Infante is a 75y.o. 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 Paloma Santos for management of symptoms and support. The patients social history includes worked in the [employer] all his life, retired, lives with wife. He has 1 daughter from previous marriage and lost one son. His son-in-law passed away from metastatic melanoma and so he wants to spare his daughter from seeing him go through cancer. Initial Referral Intake note from Jaclyn Petty RN reviewed prior to visit PALLIATIVE DIAGNOSES:  
 
  ICD-10-CM ICD-9-CM 1. Laryngeal squamous cell carcinoma (HCC) C32.9 161.9 PLAN:  
Patient Instructions Dear Ivette Infante , It was a pleasure seeing you today at [facility] office We will see you again in 4 weeks This is the plan we talked about: 1. Acid reflux - You are on Zantac but still suffer from severe acid reflux. 
- Stop Zantac, let us start Protonix 40 mg two times a day, this is a much stronger medicine. 2. Swallowing - You have trouble swallowing because of food pipe narrowing from previous radiation treatments. - Please start Metoclopramide 5 mg tab 20-30 mins prior to eating to help with moving your food down better. - Please eat just a few bites of food at a time every 30 mins or so, so that you are able to swallow better. 3. Fatigue and mood - You were so tired after the first round of chemo and suffered some thoughts about self harm. These thoughts disappeared in a few days. 
- You agreed to have your friend take all the ammunition away so you get to still have your firearms in the house. - We will check in with you every couple of days after your chemo next week. - I will d/w Dr. Gonzalez Knows and see if he wants to reduce your chemo dose as you and your wife feel the chemo dose was too high for you. - We will also arrange for IV fluids 3-4 days after your chemo next Monday. 4. ACP 
- We discussed the need for an advance medical directive in detail today. - We agreed on completing one next visit. I am providing you with the document and some reading material regarding the same. 
- We discussed your wished regarding CPR/ Intubation, you want to remain FULL code. This is what you have shared with us about Advance Care Planning: 
 
  Primary Decision Maker: Hiram Woodruff Spouse - [phone] Advance Care Planning 4/22/2019 Patient's Healthcare Decision Maker is: Legal Next of Kin Confirm Advance Directive None Patient Would Like to Complete Advance Directive Yes The Palliative Medicine Team is here to support you and your family. Sincerely, 
 
 
Reagan Anders MD and the Palliative Medicine Team 
 
 
 GOALS OF CARE / TREATMENT PREFERENCES:  
[====Goals of Care====] GOALS OF CARE: 
Patient / health care proxy stated goals: See Patient Instructions / Summary TREATMENT PREFERENCES:  
Code Status:  [x] Attempt Resuscitation       [] Do Not Attempt Resuscitation Advance Care Planning: 
[x] [facility] Interdisciplinary Team has updated the ACP Navigator with Decision Maker and Patient Capacity Primary Decision Maker: Hiram Woodruff Spouse - [phone] Advance Care Planning 4/22/2019 Patient's Healthcare Decision Maker is: Legal Next of Kin Confirm Advance Directive None Patient Would Like to Complete Advance Directive Yes Other: 
(If patient appropriate for POST, consider using PALLPOST smart phrase here) The palliative care team has discussed with patient / health care proxy about goals of care / treatment preferences for patient. 
[====Goals of Care====] PRESCRIPTIONS GIVEN:  
 
Medications Ordered Today Medications  metoclopramide HCl (REGLAN) 5 mg tablet Sig: Take 1 Tab by mouth four (4) times daily for 10 days. Dispense:  40 Tab Refill:  0  
 pantoprazole (PROTONIX) 40 mg tablet Sig: Take 1 Tab by mouth daily. Dispense:  60 Tab Refill:  3 FOLLOW UP: Future Appointments Date Time Provider Rishabh Teresa 4/29/2019  8:00 AM [place] INFUSION NURSE 4 [facility] REG  
4/29/2019  8:45 AM Sybil Marc NP ONC JENNY SCHED  
5/20/2019  8:00 AM [place] INFUSION NURSE 1 [facility] REG  
6/10/2019  8:00 AM [place] INFUSION NURSE 1 [facility]  
7/1/2019  8:00 AM [place] INFUSION NURSE 1 [facility] REG  
7/22/2019  8:00 AM [place] INFUSION NURSE 1 [facility] REG  
8/27/2019  9:00 AM Chance José MD [address] PHYSICIANS INVOLVED IN CARE:  
Patient Care Team: 
Chance José MD as PCP - Danielle Dsouza MD as Physician (Ophthalmology) HISTORY:  
Reviewed patient-completed ESAS and advance care planning form. Reviewed patient record in prescription monitoring program. 
 
CHIEF COMPLAINT: No chief complaint on file. HPI/SUBJECTIVE: The patient is: [x] Verbal / [] Nonverbal  
 
Patient seen along with his wife Uriah Leo and her  Davie Mathew. Suicidal ideation-patient reports feeling thoughts of ending his life with a gun about 4 days after his first round of chemotherapy and the thought lasted for about 3-4 days.   He reports this being a strange feeling for him as he has never been depressed or had experienced suicidal ideation in the past.  He was also feeling like it came out of nowhere and he had to literally talk himself into thinking rationally. He did not make any real plans to carry out harming himself with a gun but it scared him enough to the point that he has decided to give away the little ammunition that he has for the 2 workable guns to his friend for safe keeping. His wife reports that he was feeling so poorly after the first round of chemo with increased overall pain, very poor eating with very low calorie consumption, a rash etc.  She feels he had something of the other problem every day and therefore he could be feeling poorly from that. They both also claimed that the chemo was too harsh and wondering if a dose reduction can be done. Pain-he has lived with chronic pain all his life and does not think that current pain is any different and therefore does not want to take any medicine for it. Acid reflux-he suffers from severe acid reflux and has since his treatment for laryngeal cancer. He takes Zantac but does not think it helps. Swallowing-He feels like food gets stuck right after the swallowing and he was told he has a narrow esophagus after having radiation treatment in 2016. Clinical Pain Assessment (nonverbal scale for nonverbal patients):  
[++++ Clinical Pain Assessment++++] [++++Pain Severity++++]: Pain: 1 
[++++Pain Character++++]: deep, aching 
[++++Pain Duration++++]: years [++++Pain Effect++++]: none in particular 
[++++Pain Factors++++]: none in particular 
[++++Pain Frequency++++]: on and off 
[++++Pain Location++++]: back and hips [++++ Clinical Pain Assessment++++] FUNCTIONAL ASSESSMENT:  
 
Palliative Performance Scale (PPS): PPS: 70 PSYCHOSOCIAL/SPIRITUAL SCREENING:  
 
Any spiritual / Gnosticism concerns: 
[] Yes /  [x] No 
 
Caregiver Burnout: 
[] Yes /  [x] No /  [] No Caregiver Present Anticipatory grief assessment:  
[x] Normal  / [] Maladaptive ESAS Anxiety: Anxiety: 5 ESAS Depression: Depression: 4 REVIEW OF SYSTEMS:  
 
The following systems were [x] reviewed / [] unable to be reviewed Systems: constitutional, ears/nose/mouth/throat, respiratory, gastrointestinal, genitourinary, musculoskeletal, integumentary, neurologic, psychiatric, endocrine. Positive findings noted below. Modified ESAS Completed by: provider Fatigue: 5 Drowsiness: 3 Depression: 4 Pain: 1 Anxiety: 5 Nausea: 0 Anorexia: 5 Dyspnea: 4 [facility] Constipation: No  
Other Problem (Comment): 0 PHYSICAL EXAM:  
 
Wt Readings from Last 3 Encounters:  
19 137 lb (62.1 kg) 19 137 lb 8 oz (62.4 kg) 19 137 lb 8 oz (62.4 kg) Blood pressure 154/75, pulse 80, temperature 98.4 °F (36.9 °C), temperature source Oral, resp. rate 18, height 5' 10\" (1.778 m), weight 137 lb (62.1 kg), SpO2 94 %. Last bowel movement: See Nursing Note Constitutional: Alert, oriented Eyes: pupils equal, anicteric ENMT: no nasal discharge, moist mucous membranes Cardiovascular: regular rhythm, distal pulses intact Respiratory: breathing not labored, symmetric Gastrointestinal: soft non-tender, +bowel sounds Musculoskeletal: no deformity, no tenderness to palpation Skin: warm, dry Neurologic: following commands, moving all extremities Psychiatric: full affect, no hallucinations Other: 
 
 
 HISTORY:  
 
Past Medical History:  
Diagnosis Date  Acquired hypothyroidism 3/5/2018  
 HTN (hypertension)  Hypercholesterolemia  Hyperlipidemia  Laryngeal squamous cell carcinoma ([facility] [address] 2016  Subclinical hypothyroidism Past Surgical History:  
Procedure Laterality Date  HX ORTHOPAEDIC Back surgery x3  IR INSERT TUNL CVC W PORT OVER 5 YEARS  3/14/2019 Family History Problem Relation Age of Onset  Dementia Mother  Alcohol abuse Father History reviewed, no pertinent family history. Social History Tobacco Use  Smoking status: Former Smoker  Smokeless tobacco: Never Used Substance Use Topics  Alcohol use: No  
 
Allergies Allergen Reactions  Morphine Rash  Lisinopril Other (comments)  
  dizziness  Propranolol Other (comments)  
  fatigue Current Outpatient Medications Medication Sig  
 metoclopramide HCl (REGLAN) 5 mg tablet Take 1 Tab by mouth four (4) times daily for 10 days.  pantoprazole (PROTONIX) 40 mg tablet Take 1 Tab by mouth daily.  lidocaine-prilocaine (EMLA) topical cream Apply  to affected area as needed for Pain.  ondansetron (ZOFRAN ODT) 4 mg disintegrating tablet Take 1 Tab by mouth every eight (8) hours as needed for Nausea.  folic acid (FOLVITE) 1 mg tablet Take 1 Tab by mouth daily.  ALPRAZolam (XANAX) 1 mg tablet TAKE ONE-HALF TO ONE TABLET BY MOUTH EVERY 12 HOURS AS NEEDED  
 levothyroxine (SYNTHROID) 50 mcg tablet Take 1 Tab by mouth Daily (before breakfast). For thyroid  pravastatin (PRAVACHOL) 40 mg tablet TAKE ONE TABLET BY MOUTH ONCE DAILY FOR CHOLESTEROL  amLODIPine (NORVASC) 5 mg tablet TAKE ONE TABLET BY MOUTH ONCE DAILY FOR BLOOD PRESSURE No current facility-administered medications for this visit. LAB DATA REVIEWED:  
 
Lab Results Component Value Date/Time WBC 11.0 04/08/2019 08:10 AM  
 HGB 13.6 04/08/2019 08:10 AM  
 PLATELET 713 86/48/3062 08:10 AM  
 
Lab Results Component Value Date/Time Sodium 134 (L) 04/08/2019 08:10 AM  
 Potassium 3.7 04/08/2019 08:10 AM  
 Chloride 103 04/08/2019 08:10 AM  
 CO2 26 04/08/2019 08:10 AM  
 BUN 14 04/08/2019 08:10 AM  
 Creatinine 0.71 04/08/2019 08:10 AM  
 Calcium 8.9 04/08/2019 08:10 AM  
 Magnesium 1.6 10/18/2016 10:24 AM  
  
Lab Results Component Value Date/Time AST (SGOT) 20 04/08/2019 08:10 AM  
 Alk.  phosphatase 80 04/08/2019 08:10 AM  
 Protein, total 7.4 04/08/2019 08:10 AM  
 Albumin 3.5 04/08/2019 08:10 AM  
 Globulin 3.9 04/08/2019 08:10 AM  
 
 No results found for: INR, PTMR, PTP, PT1, PT2, APTT No results found for: IRON, FE, TIBC, IBCT, PSAT, FERR CONTROLLED SUBSTANCES SAFETY ASSESSMENT (IF ON CONTROLLED SUBSTANCES):  
 
Reviewed opioid safety handout:  [] Yes   [] No 
24 hour opioid dose >150mg morphine equivalent/day:  [] Yes   [] No 
Benzodiazepines:  [] Yes   [] No 
Sleep apnea:  [] Yes   [] No 
Urine Toxicology Testing within last 6 months:  [] Yes   [] No 
History of or new aberrant medication taking behaviors:  [] Yes   [] No 
Has Narcan been prescribed [] Yes   [] No 
 
   
 
Total time: 70m Counseling / coordination time:  
> 50% counseling / coordination?:

## 2019-04-22 NOTE — PATIENT INSTRUCTIONS
Dear Heidi Camargo , It was a pleasure seeing you today at [facility] office We will see you again in 4 weeks This is the plan we talked about: 1. Acid reflux - You are on Zantac but still suffer from severe acid reflux. 
- Stop Zantac, let us start Protonix 40 mg two times a day, this is a much stronger medicine. 2. Swallowing - You have trouble swallowing because of food pipe narrowing from previous radiation treatments. - Please start Metoclopramide 5 mg tab 20-30 mins prior to eating to help with moving your food down better. - Please eat just a few bites of food at a time every 30 mins or so, so that you are able to swallow better. 3. Fatigue and mood - You were so tired after the first round of chemo and suffered some thoughts about self harm. These thoughts disappeared in a few days. 
- You agreed to have your friend take all the ammunition away so you get to still have your firearms in the house. - We will check in with you every couple of days after your chemo next week. - I will d/w Dr. Awa Lemus and see if he wants to reduce your chemo dose as you and your wife feel the chemo dose was too high for you. - We will also arrange for IV fluids 3-4 days after your chemo next Monday. 4. ACP 
- We discussed the need for an advance medical directive in detail today. - We agreed on completing one next visit. I am providing you with the document and some reading material regarding the same. 
- We discussed your wished regarding CPR/ Intubation, you want to remain FULL code. This is what you have shared with us about Advance Care Planning: 
 
  Primary Decision Maker: Junior Turner Spouse - [phone] Advance Care Planning 4/22/2019 Patient's Healthcare Decision Maker is: Legal Next of Kin Confirm Advance Directive None Patient Would Like to Complete Advance Directive Yes The Palliative Medicine Team is here to support you and your family.   
 
 
 
Sincerely, 
 
 Lyssa Louis MD and the Palliative Medicine Team

## 2019-04-22 NOTE — PROGRESS NOTES
Palliative Medicine Office Visit Palliative Medicine Nurse Check In 
[phone] Patient Name: Akiko Sheth YOB: 1945 Date of Office Visit: 4/22/2019 Patient states: \"  \" 
 
From Check In Sheet (scanned in Media): 
Has a medical provider talked with you about cardiopulmonary resuscitation (CPR)? [] Yes   [x] No   [] Unable to obtain Nurse reminder to complete or update ACP FlowSheet: 
 
Is ACP on the Problem List?    [] Yes    [x] No 
IF ACP Document is ON FILE; Nurse to place ACP on Problem List  
 
Is there an ACP Note in Chart Review/Note? [] Yes    [x] No  
If NO: ALERT PROVIDER Primary Decision Maker: Beto Lozano [facility] - [phone] Advance Care Planning 4/22/2019 Patient's Healthcare Decision Maker is: Legal Next of Kin Confirm Advance Directive None Patient Would Like to Complete Advance Directive Yes Is there anything that we should know about you as a person in order to provide you the best care possible? Have you been to the ER, urgent care clinic since your last visit? [] Yes   [x] No   [] Unable to obtain Have you been hospitalized since your last visit? [] Yes   [x] No   [] Unable to obtain Have you seen or consulted any other health care providers outside of the [address] [place] [address] since your last visit? [] Yes   [x] No   [] Unable to obtain Functional status (describe):  
 
 
 
Last BM: 4/22/2019  accessed (date): 4/22/2019 Bottle review (for opioid pain medication): 
Medication 1:  
Date filled:  
Directions:  
# filled: # left: # pills taking per day: 
Last dose taken: 
 
Medication 2:  
Date filled:  
Directions:  
# filled: # left: # pills taking per day: 
Last dose taken: 
 
Medication 3:  
Date filled:  
Directions:  
# filled: # left: # pills taking per day: 
Last dose taken: 
 
Medication 4:  
Date filled:  
Directions:  
# filled: # left: # pills taking per day: 
Last dose taken:

## 2019-04-22 NOTE — PROGRESS NOTES
Palliative Medicine Social Work Narrative Serafin Anderson is a 75 y.o. male presenting for his initial Palliative Medicine OP clinic appointment along with his wife. SW visited briefly, introducing Palliative Social Work as part of the PM supportive team by providing emotional support, resource referrals, advocacy, assistance with AMDs and counseling. Today, pt decided to hold off on filling out his AMD as patient's wife would like to discuss further. SW provided pt with information about the AMD in order to help with family understanding. Assessment / Action: 
Patient was alert and oriented x4. Patient's mood was euthymic and affect congruent. Patient reported that following his first chemotherapy treatment he experienced SI. Patient was very confused by this and had a difficult time making sense of why he would have such \"irrational\" thoughts. Patient reported he has guns at home and did not want to remove them however agreed to have his friend remove all of the ammunition from his guns. Planned for safety and patient's wife was on board with this plan. Plan:  
Continue to meet with the patient when he returns to the clinic for ongoing assessment of the patients adjustment to treatment. Ongoing psychosocial support as desired by patient. Nelson Doherty, [facility] Palliative Medicine,  [phone]

## 2019-04-29 NOTE — PROGRESS NOTES
[facility] Progress Note    Date: 2019    Name: Arsh Escobar    MRN: [id number]         : 1945    Mr. Compa Nava Arrived ambulatory at 0810 and in no distress for cycle 2 day 1 of chemo regimen. Assessment was completed, main c/o is that of fatigue and weakness, dryness of the throat and hoarness, and constipation that is managed with stool softeners. R chest PAC accessed without difficulty, labs drawn and in process. Chemotherapy Flowsheet 2019   Cycle C2D1   Date 2019   Drug / Regimen Keytruda   Pre Meds -   Notes given       930:  Proceeded to appt with Dr. Chapin Chavez. Mr. Ezio Champion vitals were reviewed. Visit Vitals  /69 (BP 1 Location: Left arm, BP Patient Position: Sitting)   Pulse 76   Temp 97.5 °F (36.4 °C)   Resp 18   Ht 5' 10\" (1.778 m)   Wt 61.4 kg (135 lb 4.8 oz)   BMI 19.41 kg/m²       Recent Results (from the past 12 hour(s))   CBC WITH AUTOMATED DIFF    Collection Time: 19  8:31 AM   Result Value Ref Range    WBC 4.4 4.1 - 11.1 K/uL    RBC 3.74 (L) 4.10 - 5.70 M/uL    HGB 11.2 (L) 12.1 - 17.0 g/dL    HCT 32.0 (L) 36.6 - 50.3 %    MCV 85.6 80.0 - 99.0 FL    MCH 29.9 26.0 - 34.0 PG    MCHC 35.0 30.0 - 36.5 g/dL    RDW 12.6 11.5 - 14.5 %    PLATELET 524 179 - 161 K/uL    MPV 9.6 8.9 - 12.9 FL    NRBC 0.0 0  WBC    ABSOLUTE NRBC 0.00 0.00 - 0.01 K/uL    NEUTROPHILS 64 32 - 75 %    LYMPHOCYTES 14 12 - 49 %    MONOCYTES 22 (H) 5 - 13 %    EOSINOPHILS 0 0 - 7 %    BASOPHILS 0 0 - 1 %    IMMATURE GRANULOCYTES 0 0.0 - 0.5 %    ABS. NEUTROPHILS 2.8 1.8 - 8.0 K/UL    ABS. LYMPHOCYTES 0.6 (L) 0.8 - 3.5 K/UL    ABS. MONOCYTES 1.0 0.0 - 1.0 K/UL    ABS. EOSINOPHILS 0.0 0.0 - 0.4 K/UL    ABS. BASOPHILS 0.0 0.0 - 0.1 K/UL    ABS. IMM.  GRANS. 0.0 0.00 - 0.04 K/UL    DF MANUAL      RBC COMMENTS NORMOCYTIC, NORMOCHROMIC     METABOLIC PANEL, COMPREHENSIVE    Collection Time: 19  8:31 AM   Result Value Ref Range    Sodium 133 (L) 136 - 145 mmol/L    Potassium 3.9 3.5 - 5.1 mmol/L    Chloride 102 97 - 108 mmol/L    CO2 24 21 - 32 mmol/L    Anion gap 7 5 - 15 mmol/L    Glucose 104 (H) 65 - 100 mg/dL    BUN 9 6 - 20 MG/DL    Creatinine 0.59 (L) 0.70 - 1.30 MG/DL    BUN/Creatinine ratio 15 12 - 20      GFR est AA >60 >60 ml/min/1.73m2    GFR est non-AA >60 >60 ml/min/1.73m2    Calcium 8.5 8.5 - 10.1 MG/DL    Bilirubin, total 0.2 0.2 - 1.0 MG/DL    ALT (SGPT) 26 12 - 78 U/L    AST (SGOT) 21 15 - 37 U/L    Alk. phosphatase 76 45 - 117 U/L    Protein, total 7.5 6.4 - 8.2 g/dL    Albumin 3.2 (L) 3.5 - 5.0 g/dL    Globulin 4.3 (H) 2.0 - 4.0 g/dL    A-G Ratio 0.7 (L) 1.1 - 2.2       Pre-medications were administered as ordered and chemotherapy was initiated. NS 25cc/hr  Keytruda 200mg  Zometa 4mg     Mr. Dana Mosley tolerated treatment well. R chest PAC flushed per protocol and Samra Sands D/Elaine. Pt was discharged from [place] [id number] in stable condition at 1205. He is to return on 5/20/19 at 0800 for his next appointment.     Leonor De La O  April 29, 2019

## 2019-04-29 NOTE — PROGRESS NOTES
[address] [address], [facility] [place] [address] 
[phone] Follow-up Note Patient: Sujatha Vieira MRN: [id number]  SSN: [id number] YOB: 1945  Age: 75 y.o. Sex: male Diagnosis: 1. Metastatic adenocarcinoma of the lung Multifocal disease in the bone 2. Squamous cell carcinoma of the larynx:  
 T3 N0 M0 (Stage III) HPV status unknown Treatment: 1. Palliative chemotherapy Carboplatin, Alimta and Pembrolizumab - s/p 1 cycle Pembrolizumab monotherapy, cycle 1 2. Concurrent chemo/rads with cisplatin, completed 10/2016 Subjective:  
  
Sujatha Vieira is a 75 y.o. male with a diagnosis of squamous cell laryngeal carcinoma. He was diagnosed by Dr. Alicia in July 2016. He has long history of pipe smoking. He quit all smoking more than 30 years ago.  April Harsh received concurrent radiation with weekly Cisplatin. CT shows resolving laryngeal mass. Laryngoscopy by Dr. Alicia on 1/19/2017 shows no tumor. Recent CT shows a questionable area in the spine. He had a NM bone scan which showed multiple lesions in the bone. Biopsy of the bone showed metastatic disease of lung primary. The tissue is insufficient for biomarker testing. He received the first cycle of palliative chemotherapy with Carboplatin, Alimta and Pembrolizumab. He was very fatigued and depressed after treatment and felt the chemotherapy was too much. He also complains of mouth sores. Review of Systems:  
 
Constitutional: fatigue, depression Eyes: negative Ears, Nose, Mouth, Throat, and Face: mouth sores Respiratory: negative Cardiovascular: negative Gastrointestinal: poor taste Genitourinary:negative Integument/Breast: negative Hematologic/Lymphatic: negative Musculoskeletal:negative Neurological: negative Past Medical History:  
Diagnosis Date  Acquired hypothyroidism 3/5/2018  HTN (hypertension)  Hypercholesterolemia  Hyperlipidemia  Laryngeal squamous cell carcinoma ([facility] [address] 2016  Subclinical hypothyroidism Past Surgical History:  
Procedure Laterality Date  HX ORTHOPAEDIC Back surgery x3  IR INSERT TUNL CVC W PORT OVER 5 YEARS  3/14/2019 Family History Problem Relation Age of Onset  Dementia Mother  Alcohol abuse Father Social History Tobacco Use  Smoking status: Former Smoker  Smokeless tobacco: Never Used Substance Use Topics  Alcohol use: No  
  
Prior to Admission medications Medication Sig Start Date End Date Taking? Authorizing Provider  
dexamethasone (DECADRON) 0.5 mg/5 mL elixir Take 5 mL by mouth three (3) times daily for 30 days. 4/29/19 5/29/19 Yes Rupa Bruner NP  
metoclopramide HCl (REGLAN) 5 mg tablet Take 1 Tab by mouth four (4) times daily for 10 days. 4/22/19 5/2/19 Yes Reagan Anders MD  
pantoprazole (PROTONIX) 40 mg tablet Take 1 Tab by mouth daily. 4/22/19  Yes Reagan Anders MD  
lidocaine-prilocaine (EMLA) topical cream Apply  to affected area as needed for Pain. 3/27/19  Yes Kary Esparza MD  
ondansetron (ZOFRAN ODT) 4 mg disintegrating tablet Take 1 Tab by mouth every eight (8) hours as needed for Nausea. 3/27/19  Yes Kary Esparza MD  
folic acid (FOLVITE) 1 mg tablet Take 1 Tab by mouth daily. 3/27/19  Yes Kary Esparza MD  
ALPRAZolam Lisa Shelter) 1 mg tablet TAKE ONE-HALF TO ONE TABLET BY MOUTH EVERY 12 HOURS AS NEEDED 2/26/19  Yes Gauri Palacios MD  
levothyroxine (SYNTHROID) 50 mcg tablet Take 1 Tab by mouth Daily (before breakfast). For thyroid 2/26/19  Yes Gauri Palacios MD  
pravastatin (PRAVACHOL) 40 mg tablet TAKE ONE TABLET BY MOUTH ONCE DAILY FOR CHOLESTEROL 2/26/19  Yes Gauri Palacios MD  
amLODIPine (NORVASC) 5 mg tablet TAKE ONE TABLET BY MOUTH ONCE DAILY FOR BLOOD PRESSURE 2/5/19  Yes Gauri Palacios MD  
  
 
 
Allergies Allergen Reactions  Morphine Rash  Lisinopril Other (comments)  
  dizziness  Propranolol Other (comments)  
  fatigue Objective:  
 
Visit Vitals /70 (BP 1 Location: Left arm, BP Patient Position: Sitting) Pulse 84 Temp 97.5 °F (36.4 °C) (Oral) Resp 18 Ht 5' 10\" (1.778 m) Wt 133 lb (60.3 kg) SpO2 95% BMI 19.08 kg/m² Pain Scale: 0 - No pain/10 Pain Location:  
 
 
Physical Exam: 
 
GENERAL: alert, cooperative HEENT: throat dry LYMPHATIC: Cervical, supraclavicular, and axillary nodes normal.  
THROAT & NECK: dry oral mucosa LUNG: clear to auscultation bilaterally HEART: regular rate and rhythm ABDOMEN: soft, non-tender EXTREMITIES: no cyanosis or edema SKIN: Normal. 
NEUROLOGIC: negative Lab Results Component Value Date/Time WBC 4.4 04/29/2019 08:31 AM  
 Hemoglobin (POC) 9.5 (L) 10/18/2016 10:31 AM  
 HGB 11.2 (L) 04/29/2019 08:31 AM  
 Hematocrit (POC) 28 (L) 10/18/2016 10:31 AM  
 HCT 32.0 (L) 04/29/2019 08:31 AM  
 PLATELET 806 68/71/4927 08:31 AM  
 MCV 85.6 04/29/2019 08:31 AM  
 
 
Lab Results Component Value Date/Time Sodium 133 (L) 04/29/2019 08:31 AM  
 Potassium 3.9 04/29/2019 08:31 AM  
 Chloride 102 04/29/2019 08:31 AM  
 CO2 24 04/29/2019 08:31 AM  
 Anion gap 7 04/29/2019 08:31 AM  
 Glucose 104 (H) 04/29/2019 08:31 AM  
 BUN 9 04/29/2019 08:31 AM  
 Creatinine 0.59 (L) 04/29/2019 08:31 AM  
 BUN/Creatinine ratio 15 04/29/2019 08:31 AM  
 GFR est AA >60 04/29/2019 08:31 AM  
 GFR est non-AA >60 04/29/2019 08:31 AM  
 Calcium 8.5 04/29/2019 08:31 AM  
 Bilirubin, total 0.2 04/29/2019 08:31 AM  
 AST (SGOT) 21 04/29/2019 08:31 AM  
 Alk.  phosphatase 76 04/29/2019 08:31 AM  
 Protein, total 7.5 04/29/2019 08:31 AM  
 Albumin 3.2 (L) 04/29/2019 08:31 AM  
 Globulin 4.3 (H) 04/29/2019 08:31 AM  
 A-G Ratio 0.7 (L) 04/29/2019 08:31 AM  
 ALT (SGPT) 26 04/29/2019 08:31 AM  
 
 
 
 
CT Results (most recent): 
 Results from Hospital Encounter encounter on 03/19/19 CT CHEST W CONT Narrative EXAM:  CT CHEST W CONT, CT ABD PELV W CONT INDICATION:   lung nodule. Laryngeal squamous cell carcinoma. Right iliac bone 
metastases confirmed on CT-guided biopsy 3/4/2019. COMPARISON: CT chest 1/8/2019, 11/18/2016. 7/6/2017 Oris Gruber TECHNIQUE:  
Multislice helical CT was performed from the thoracic inlet to the pubic 
symphysis was performed with 100 CC Isovue intravenous contrast administration. Oral contrast was not administered. Contiguous 5 mm axial images were 
reconstructed and lung and soft tissue windows were generated. Coronal and 
sagittal reformations were generated. CT dose reduction was achieved through the use of a standardized protocol 
tailored for this examination and automatic exposure control for dose 
modulation. Oris Gruber FINDINGS: 
CHEST: 
Thyroid: Unremarkable. Oris Gruber Mediastinum/ayaka: No pathologically enlarged mediastinal or hilar). Prominent 
pretracheal nodes are unchanged from prior. On image 2-26 these measure up to 
0.9 cm in short axis. Small hiatal hernia. Oris Gruber Heart/vessels: IJ port terminates at the cavoatrial junction. Mild 
atherosclerotic disease of the aorta and its branches. No thoracic aortic 
aneurysm. Moderate coronary artery calcifications. The heart is not enlarged. No 
pericardial effusion. Oris Gruber Lungs/Pleura: Severe underlying emphysema with scarring complicated evaluation 
of the lung parenchyma. In the anterior right upper lobe there is an area of 
consolidation with volume loss on image 4-26 which appears unchanged dating back 
to 7/19/2018. The area appears more solid when compared to 7/6/2017. Mild 
groundglass opacities with scarring along the right upper and lower lobes, image 4-44 do not appear significantly changed from 7/19/2018. Unchanged 0.9 cm 
lingular nodule, image 4-53 dating back to 7/6/2017.  Small nodules along the 
 right major fissure, image 4-38 are unchanged from 2017. Increased volume of a 
small right effusion. Marilynne Ruts Bones and soft tissues: Numerous sclerotic lesions throughout the spine and 
within the sternum which do not appear significantly changed from prior. No 
vertebral body height collapse. ABDOMEN: 
Liver: Unremarkable. No focal lesion. Marilynne Ruts Gallbladder/Biliary: Unremarkable. No cholelithiasis or biliary dilation. Spleen: No splenomegaly or suspicious lesion. Marilynne Ruts Pancreas: Unremarkable. Marilynne Ruts Adrenals: Unremarkable. No nodule. Marilynne Ruts Kidneys: Symmetric enhancement. No suspicious mass, hydronephrosis, or 
nephrolithiasis. Marilynne Ruts Ureters: Unremarkable. Marilynne Ruts Bladder: Unremarkable. Marilynne Ruts Gastrointestinal tract: No evidence of obstruction or focal inflammation. The 
appendix is not well-visualized. Marilynne Ruts Peritoneum: No ascites or pneumoperitoneum. Marilynne Ruts Retroperitoneum: Moderate atherosclerotic disease of the abdominal aorta and its 
branches. No aneurysmal dilation. No lymphadenopathy. Reproductive System: Prostatomegaly. Marilynne Ruts Bones and soft tissues: There are numerous sclerotic lesions throughout the 
spine and pelvis. These appear comparable to the prior MR pelvis. Lucien Rogerss Impression IMPRESSION: 
Chest: 1. Severe underlying emphysema with scarring and bilateral lung 
nodules/scarring. No significant change dating back to July 2018. Continued 
attention on follow-up. 2.  No significant change in osseous metastatic disease. 3.  Increased small right effusion. Abdomen and pelvis: 1. Extensive osseous metastatic disease involving the spine and pelvis, 
comparable to prior studies. No vertebral body height collapse. MRI Results (most recent): 
Results from Hospital Encounter encounter on 02/21/19 MRI LUMB SPINE W WO CONT Narrative EXAM: MRI LUMB SPINE W WO CONT INDICATION: Abnormal bone scan. Malignant neoplasm of larynx, squamous cell 
carcinoma C 32.9. R 94.8. Back pain. COMPARISON: Radiographs 1/23/2013, bone scan 1/21/2019 TECHNIQUE: MR imaging of the lumbar spine was performed using the following 
sequences: sagittal T1, T2, STIR;  axial T1, T2 prior to and following contrast 
administration. CONTRAST: 13 mL of Dotarem. FINDINGS: 
 
Conus position, morphology, signal and enhancement are normal. Vertebral body 
heights are normally maintained. There are several areas of abnormal bone signal consistent with osseous 
metastatic disease. These include the anterior T12 vertebral body measuring 10 
mm and 3 mm, the right anteroinferior L1 vertebral body measuring 5 mm, the mid 
anteroinferior L2 vertebral body measuring 6 mm, the left posterior L3 vertebral 
body measuring 12 mm and 6 mm, the posterior L5 vertebral body measuring 4 mm, 
the inferior L5 vertebral body measuring 12 mm, at the visualized superior right 
sacral wing measuring 9 mm, the left sacral wing measuring 6 mm, and the left 
posterior iliac bone measuring 9 mm and 6 mm. There is L2-3 retrolisthesis measuring 5 mm and L3-4 retrolisthesis measuring 2 
to 3 mm. There is a mild levoconvex lumbar curve centered at L3-4. No paraspinal soft tissue mass or enhancement abnormality is shown. Ankylosis of 
the visualized superior right SI joint is shown with bridging osteophyte 
formation. Small left SI joint osteophytes are also noted. T12-L1: Normal disc and facets. L1-L2: Mild disc space narrowing and diffuse disc bulging. Mild bilateral facet 
osteoarthrosis. No canal or foraminal stenosis. L2-L3: Moderate to severe disc space narrowing with mild to moderate diffuse 
disc bulging and bilateral facet osteoarthrosis. Mild canal stenosis and right 
foraminal stenosis. L3-L4: Moderate disc space narrowing and diffuse disc bulging. Mild to moderate 
bilateral facet osteoarthrosis. Moderate canal stenosis. Mild to moderate right 
and mild left foraminal stenosis. L4-L5: Moderate to severe disc space narrowing. Mild diffuse disc bulging. Mild 
to moderate bilateral facet osteoarthrosis. Mild to moderate canal stenosis. Mild to moderate right foraminal stenosis. L5-S1: Moderate to severe disc space narrowing. Mild to moderate diffuse disc 
bulging. Mild bilateral facet osteoarthrosis. No canal stenosis. Moderate left 
foraminal stenosis. Impression IMPRESSION: 
 
1. Multifocal osseous metastatic disease with lesion size measuring up to 12 mm. 2. Multilevel degenerative changes detailed by level above. Note L3-4 moderate 
canal stenosis. I reviewed the images personally. Abnormal sclerotic areas in multiple vertebrae Assessment: 1. Metastatic adenocarcinoma of the lung Numerous bone metastasis ECOG PS 1 Intent of Treatment - palliative Prognosis - poor The tissue from bone is insufficient for biomarker testing. No clear primary in the lung Receiving palliative chemotherapy Carboplatin, Alimta, Pembrolizumab - s/p 1 cycle Pembrolizumab - Cycle 2 Day 1 (carboplatin, alimta discontinued after cycle 1 d/t patient preference) He did not do well after the last treatment. Excessive fatigue, weakness, anorexia etc.  
I the interest of his poor health, I am discontinuing Carboplatin and Alimta I will continue single agent Pembrolizumab A detailed system by system evaluation of side effect was performed to assess chemotherapy related toxicity. Blood counts are acceptable. Results reviewed with the patient. Symptom management form reviewed and scanned into the EMR under Media. 2. Squamous cell carcinoma of the larynx:  
 T3 N0 M0 (Stage III) HPV status unknown Previously Completed concurrent chemo/rads 10/2016 Cisplatin weekly X 6 In remission 3. Protein calorie malnutrition, severe Dietician consult Protein supplementation 4. Mucositis Dexamethasone mouth rinse Plan: > Discontinue Carboplatin and Alimta 
> Continue Pembrolizumab  
> Following with Palliative Medicine 
> Liquid tumor NGS testing 
> Starting Xgeva monthly  
> Dexamethasone rinse 
> Follow-up in 3 weeks Signed by: Falguni Salmon MD 
                   April 29, 2019 
 
 
 
CC. Vijay Hickman MD ([facility]) CC. Jose Segura MD 
CC.  Rosa Griffith MD

## 2019-04-29 NOTE — PROGRESS NOTES
Identified pt with two pt identifiers(name and ). Reviewed record in preparation for visit and have obtained necessary documentation. Chief Complaint Patient presents with  
 Other Laryngeal Cancer C2,D1 Visit Vitals /70 (BP 1 Location: Left arm, BP Patient Position: Sitting) Pulse 84 Temp 97.5 °F (36.4 °C) (Oral) Resp 18 Ht 5' 10\" (1.778 m) Wt 133 lb (60.3 kg) SpO2 95% BMI 19.08 kg/m² Health Maintenance Due Topic  Shingrix Vaccine Age 50> (1 of 2)  Pneumococcal 65+ years (1 of 2 - PCV13)  GLAUCOMA SCREENING Q2Y Coordination of Care Questionnaire: 
:  
1) Have you been to an emergency room, urgent care, or hospitalized since your last visit? If yes, where when, and reason for visit? no  
 
 
2. Have seen or consulted any other health care provider since your last visit? If yes, where when, and reason for visit? NO 
 
 
3) Do you have an Advanced Directive/ Living Will in place? NO If yes, do we have a copy on file NO If no, would you like information NO Patient is accompanied by wife I have received verbal consent from Rogerio Nash to discuss any/all medical information while they are present in the room.

## 2019-05-01 NOTE — PROGRESS NOTES
Palliative Medicine  Nursing Note  [phone]  Fax [phone]      Telephone Call  Patient Name: Brookie Baumgarten  YOB: [birth year]        Primary Decision Maker: Niranjan Aguilar Spouse - [phone]   Advance Care Planning 4/22/2019   Patient's Healthcare Decision Maker is: Legal Next of Kin   Confirm Advance Directive None   Patient Would Like to Complete Advance Directive Yes       Voicemail message left for Mr. Dillon Saeed to let him know that PM nurse was calling to check on him after receiving his chemo on Monday.       Information was shared with Dr. Philipp Zazueta, BSN, RN, OCN, VIA [facility]  Palliative Medicine

## 2019-05-02 NOTE — PROGRESS NOTES
Palliative Medicine  Nursing Note  [phone]  Fax [phone]      Telephone Call  Patient Name: Jenifer Sawant  YOB: [birth year]        Primary Decision Maker: Yonas Marroquin Spouse - [phone]   Advance Care Planning 4/22/2019   Patient's Healthcare Decision Maker is: Legal Next of Kin   Confirm Advance Directive None   Patient Would Like to Complete Advance Directive Yes       Left a voice mail message for Mr. Ariana Martin to check in to see how he is doing after his chemo treatment on Monday.    5:01 pm-states that he is doing okay after his chemo on Monday. He is nervous that he going to get sick like he was the last time. He said that he had a lot of cramping yesterday but he has had some loose stools today that is making him feel better. He thinks he was constipated and now is much better. He said he had a friend come to his house and take all of his guns because he was having some thoughts of anger towards people when he normally does not have them. It made him concerned about the having the guns around him. He did said that right now he is staying positive and keeping his mind on the right things. He is eating okay and knows how important it is. He will call us if he has any needs that arise.     LOR PorrasN, RN, OCN, VIA [facility]  Palliative Medicine

## 2019-05-20 NOTE — PROGRESS NOTES
Palliative Medicine Office Visit Palliative Medicine Nurse Check In 
[phone] Patient Name: Kaleigh Rosales YOB: 1945 Date of Office Visit:  
 
Patient states: \"  \" 
 
From Check In Sheet (scanned in Media): 
Has a medical provider talked with you about cardiopulmonary resuscitation (CPR)? [] Yes   [] No   [] Unable to obtain Nurse reminder to complete or update ACP FlowSheet: 
 
Is ACP on the Problem List?    [] Yes    [] No 
IF ACP Document is ON FILE; Nurse to place ACP on Problem List  
 
Is there an ACP Note in Chart Review/Note? [] Yes    [] No  
If NO: ALERT PROVIDER Primary Decision Maker: Dorothea Gallo Yvlvtf - [phone] Advance Care Planning 5/20/2019 Patient's Healthcare Decision Maker is: Legal Next of Kin Confirm Advance Directive None Patient Would Like to Complete Advance Directive Yes Is there anything that we should know about you as a person in order to provide you the best care possible? Have you been to the ER, urgent care clinic since your last visit? [] Yes   [x] No   [] Unable to obtain Have you been hospitalized since your last visit? [] Yes   [x] No   [] Unable to obtain Have you seen or consulted any other health care providers outside of the [address] [place] [address] since your last visit? [] Yes   [x] No   [] Unable to obtain Functional status (describe):  
 
 
 
Last BM: 5/20/2019  accessed (date): 5/20/2019 Bottle review (for opioid pain medication): 
Medication 1:  
Date filled:  
Directions:  
# filled: # left: # pills taking per day: 
Last dose taken: 
 
Medication 2:  
Date filled:  
Directions:  
# filled: # left: # pills taking per day: 
Last dose taken: 
 
Medication 3:  
Date filled:  
Directions:  
# filled: # left: # pills taking per day: 
Last dose taken: 
 
Medication 4:  
Date filled:  
Directions:  
# filled: # left: # pills taking per day: Last dose taken:

## 2019-05-20 NOTE — PROGRESS NOTES
Outpatient Infusion Center - Chemotherapy Progress Note    0805- Pt admit to [facility] for [address] ambulatory in stable condition. Assessment completed. No new concerns voiced. Pt denies any recent or upcoming invasive dental work. Right chest port accessed without issue with positive blood return. Labs drawn per order and sent. Line flushed, clamped, Curos Cap applied to end clave. Pt over to MD office. Patient Vitals for the past 12 hrs:   Temp Pulse Resp BP SpO2   05/20/19 0803 97.1 °F (36.2 °C) 83 18 129/74 98 %     Recent Results (from the past 12 hour(s))   CBC WITH AUTOMATED DIFF    Collection Time: 05/20/19  8:06 AM   Result Value Ref Range    WBC 13.1 (H) 4.1 - 11.1 K/uL    RBC 4.18 4.10 - 5.70 M/uL    HGB 12.8 12.1 - 17.0 g/dL    HCT 37.2 36.6 - 50.3 %    MCV 89.0 80.0 - 99.0 FL    MCH 30.6 26.0 - 34.0 PG    MCHC 34.4 30.0 - 36.5 g/dL    RDW 14.9 (H) 11.5 - 14.5 %    PLATELET 503 695 - 646 K/uL    MPV 10.0 8.9 - 12.9 FL    NRBC 0.0 0  WBC    ABSOLUTE NRBC 0.00 0.00 - 0.01 K/uL    NEUTROPHILS 77 (H) 32 - 75 %    LYMPHOCYTES 6 (L) 12 - 49 %    MONOCYTES 8 5 - 13 %    EOSINOPHILS 8 (H) 0 - 7 %    BASOPHILS 1 0 - 1 %    IMMATURE GRANULOCYTES 0 0.0 - 0.5 %    ABS. NEUTROPHILS 10.2 (H) 1.8 - 8.0 K/UL    ABS. LYMPHOCYTES 0.8 0.8 - 3.5 K/UL    ABS. MONOCYTES 1.0 0.0 - 1.0 K/UL    ABS. EOSINOPHILS 1.0 (H) 0.0 - 0.4 K/UL    ABS. BASOPHILS 0.1 0.0 - 0.1 K/UL    ABS. IMM.  GRANS. 0.0 0.00 - 0.04 K/UL    DF AUTOMATED      RBC COMMENTS NORMOCYTIC, NORMOCHROMIC     METABOLIC PANEL, COMPREHENSIVE    Collection Time: 05/20/19  8:06 AM   Result Value Ref Range    Sodium 134 (L) 136 - 145 mmol/L    Potassium 3.9 3.5 - 5.1 mmol/L    Chloride 102 97 - 108 mmol/L    CO2 24 21 - 32 mmol/L    Anion gap 8 5 - 15 mmol/L    Glucose 112 (H) 65 - 100 mg/dL    BUN 11 6 - 20 MG/DL    Creatinine 0.71 0.70 - 1.30 MG/DL    BUN/Creatinine ratio 15 12 - 20      GFR est AA >60 >60 ml/min/1.73m2    GFR est non-AA >60 >60 ml/min/1.73m2    Calcium 8.5 8.5 - 10.1 MG/DL    Bilirubin, total 0.3 0.2 - 1.0 MG/DL    ALT (SGPT) 18 12 - 78 U/L    AST (SGOT) 22 15 - 37 U/L    Alk. phosphatase 73 45 - 117 U/L    Protein, total 7.9 6.4 - 8.2 g/dL    Albumin 3.4 (L) 3.5 - 5.0 g/dL    Globulin 4.5 (H) 2.0 - 4.0 g/dL    A-G Ratio 0.8 (L) 1.1 - 2.2     TSH 3RD GENERATION    Collection Time: 05/20/19  8:06 AM   Result Value Ref Range    TSH 2.79 0.36 - 3.74 uIU/mL     Medications:  NS KVO  Zometa IV  Keytruda IV     /70   Pulse 66   Temp 97.1 °F (36.2 °C)   Resp 18   Ht 5' 10\" (1.778 m)   Wt 60.3 kg (132 lb 14.4 oz)   SpO2 98%   BMI 19.07 kg/m²      Pt tolerated treatment well. Port maintained positive blood return throughout treatment, flushed with positive blood return at conclusion, and de-accessed. 1100- D/c home ambulatory in no distress accompanied by spouse.  Pt aware of next [facility] appointment scheduled for 6/10 at 8 AM.

## 2019-05-20 NOTE — PROGRESS NOTES
Dayton Winston a 75 y. o. male here today for metastastic lung to bone orginially from  [facility] Larynx f/u. Patient receiving Keytruda; cycle 3. Completed chemo/Rads 10/16. VS stable. Patient denies pain; pt c/o H/A. Good appetite. Patient denies N/V/D and constipation. Patient denies numbness and tingling. Patient denies mouth ulcers. Patient denies cough. Patient reports SOB exertion. Patient denies falls. Visit Vitals /68 (BP 1 Location: Left arm, BP Patient Position: Sitting) Pulse 79 Temp 97.9 °F (36.6 °C) (Oral) Resp 18 Ht 5' 10\" (1.778 m) Wt 132 lb 14.4 oz (60.3 kg) SpO2 98% BMI 19.07 kg/m² Pain Scale: 0 - No pain/10 Pain Location: 1. Have you been to the ER, urgent care clinic since your last visit? Hospitalized since your last visit? No 
 
2. Have you seen or consulted any other health care providers outside of the [address] [place] [address] since your last visit? Include any pap smears or colon screening. No  
 
Health Maintenance Review: Patient reminded of \"due or due soon\" health maintenance. I have asked the patient to contact his/her primary care provider (PCP) for follow-up on his/her health maintenance.

## 2019-05-20 NOTE — PROGRESS NOTES
Palliative Medicine Outpatient Services [place]: [phone]) Patient Name: Juan Antonio Caraballo YOB: 1945 Date of Current Visit: 05/20/19 Location of Current Visit:   
[] [facility] Office 
[] [facility] Office [x] [facility] Office 
[] Home 
[] Other:   
 
Date of Initial Visit: 4/22/2019 Referral from: Dr. Anisha Perez Date of Initial Referral: 4/11/2019 Primary Care Physician: Erwin Rios MD 
  
 SUMMARY:  
Juan Antonio Caraballo is a 75y.o. 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 Anisha Perez for management of symptoms and support. The patients social history includes worked in the [employer] at [employer] all his life, retired, lives with wife. He has 1 daughter from previous marriage and lost one son. His son-in-law passed away from metastatic melanoma and so he wants to spare his daughter from seeing him go through cancer. Discontinuation of Carbo + Alimta due to extreme poor tolerance and now on Keytruda. PALLIATIVE DIAGNOSES:  
 
  ICD-10-CM ICD-9-CM 1. Gastroesophageal reflux disease with esophagitis K21.0 530.11   
2. Pharyngoesophageal dysphagia R13.14 787.24   
3. Laryngeal squamous cell carcinoma (HCC) C32.9 161.9 4. Goals of care, counseling/discussion Z71.89 V65.49 PLAN:  
Patient Instructions Dear Juan Antonio Caraballo , It was a pleasure seeing you today at [facility] office We will see you again in 2 months. This is the plan we talked about: 1. Acid reflux - You are on Zantac but still suffer from severe acid reflux. 
- This is helping you but you also have Protonix at home, keep it for now. 2. Swallowing - You have trouble swallowing because of food pipe narrowing from previous radiation treatments.  
- You did not try Reglan because the pharmacist said it interacts with some other medicine. But since stopping chemo, you are feeling better and eating small amounts. - You also gargle with warm water to break the thick mucous. 3. Fatigue and mood - You are feeling much better now that you are off chemotherapy. You are only on Immunotherapy. 4. ACP 
- We completed an AMD after much discussion. Your wife wanted you to have CPR but no intubation because of the fear that you will be kept on the machine and suffer. We talked about how if you want attempts at resuscitation, then you will be given the chance for a best possible outcome only if both CPR and intubation are offered. It is very difficult to advocate for just CPR without intubation in a non hospital setting. If you change your mind and want protection against intubation, then we can complete POST form next visit. But a true attempt at resuscitation in patients without respiratory failure should include both CPR and intubation for best possible outcomes. Please remember that your wife will be your advocate and make decisions to take you off the breathing machine at anytime. This is what you have shared with us about Advance Care Planning: 
 
  Primary Decision Maker: Beth Bliss Spouse - [phone] Advance Care Planning 5/20/2019 Patient's Healthcare Decision Maker is: Legal Next of Kin Confirm Advance Directive None Patient Would Like to Complete Advance Directive Yes The Palliative Medicine Team is here to support you and your family. Sincerely, 
 
 
Jan Sales MD and the Palliative Medicine Team 
 
 
 GOALS OF CARE / TREATMENT PREFERENCES:  
[====Goals of Care====] GOALS OF CARE: 
Patient / health care proxy stated goals: See Patient Instructions / Summary TREATMENT PREFERENCES:  
Code Status:  [x] Attempt Resuscitation       [] Do Not Attempt Resuscitation Advance Care Planning: 
[x] [facility] Interdisciplinary Team has updated the ACP Navigator with Decision Maker and Patient Capacity Primary Decision Maker: Sonido Reese Spouse - [phone] Advance Care Planning 5/20/2019 Patient's Healthcare Decision Maker is: Legal Next of Kin Confirm Advance Directive None Patient Would Like to Complete Advance Directive Yes Other: 
(If patient appropriate for POST, consider using PALLPOST smart phrase here) The palliative care team has discussed with patient / health care proxy about goals of care / treatment preferences for patient. 
[====Goals of Care====] PRESCRIPTIONS GIVEN:  
 
No orders of the defined types were placed in this encounter. FOLLOW UP: Future Appointments Date Time Provider Rishabh Moore [facility]  [facility]  
6/10/2019  8:15 AM Gwen King NP [facility] JENNY SCHED  
7/1/2019  [facility] REG  
7/22/2019  8:00 AM [place] INFUSION NURSE 1 [address] REG  
7/22/2019 12:30 PM Víctor Colon MD [facility] JENNY SCHED  
8/27/2019  9:00 AM Stacey Kumar MD [address] [facility] PHYSICIANS INVOLVED IN CARE:  
Patient Care Team: 
Stacey Kumar MD as PCP - [facility], Puma Reddy MD as Physician (Ophthalmology) HISTORY:  
Reviewed patient-completed ESAS and advance care planning form. Reviewed patient record in prescription monitoring program. 
 
CHIEF COMPLAINT: No chief complaint on file. HPI/SUBJECTIVE: The patient is: [x] Verbal / [] Nonverbal  
 
Patient here with his wife today. He appears and feels really well. Since stopping chemotherapy he has recovered tremendously. His heartburn and trouble swallowing his own also improved. Wife seems pretty mad today because of what we wrote about him being full code in our previous after visit summary. She says that she would want him to have CPR but no intubation.   Her PCP, Dr. Gauri Goins said that patient can have a order to not intubate. We spent over 60 minutes talking about how DO NOT INTUBATE but yes to resuscitation is not possible in a nonhospital setting. Patient however wants CPR and intubation for a short period of time as a full and proper chance of not being able to resuscitate him. Wife shares that she does not want to see him intubated because of what happened with her father 20 years ago. Her father was intubated for 2 weeks and she equates that to the significant suffering. Patient says that he is okay with intubation for a short period of time if it can provide him with time for recovery or successful resuscitation. After much conversation she was able to understand the difference between full code, partial code and DNR. 
 
------------ Initial visit Patient seen along with his wife Kathleen Nava and her  Tyrell Dunlap. Suicidal ideation-patient reports feeling thoughts of ending his life with a gun about 4 days after his first round of chemotherapy and the thought lasted for about 3-4 days. He reports this being a strange feeling for him as he has never been depressed or had experienced suicidal ideation in the past.  He was also feeling like it came out of nowhere and he had to literally talk himself into thinking rationally. He did not make any real plans to carry out harming himself with a gun but it scared him enough to the point that he has decided to give away the little ammunition that he has for the 2 workable guns to his friend for safe keeping. His wife reports that he was feeling so poorly after the first round of chemo with increased overall pain, very poor eating with very low calorie consumption, a rash etc.  She feels he had something of the other problem every day and therefore he could be feeling poorly from that. They both also claimed that the chemo was too harsh and wondering if a dose reduction can be done. Pain-he has lived with chronic pain all his life and does not think that current pain is any different and therefore does not want to take any medicine for it. Acid reflux-he suffers from severe acid reflux and has since his treatment for laryngeal cancer. He takes Zantac but does not think it helps. Swallowing-He feels like food gets stuck right after the swallowing and he was told he has a narrow esophagus after having radiation treatment in 2016. Clinical Pain Assessment (nonverbal scale for nonverbal patients):  
[++++ Clinical Pain Assessment++++] [++++Pain Severity++++]: Pain: 0 
[++++Pain Character++++]: deep, aching 
[++++Pain Duration++++]: years [++++Pain Effect++++]: none in particular 
[++++Pain Factors++++]: none in particular 
[++++Pain Frequency++++]: on and off 
[++++Pain Location++++]: back and hips [++++ Clinical Pain Assessment++++] FUNCTIONAL ASSESSMENT:  
 
Palliative Performance Scale (PPS): PPS: 80 PSYCHOSOCIAL/SPIRITUAL SCREENING:  
 
Any spiritual / Methodist concerns: 
[] Yes /  [x] No 
 
Caregiver Burnout: 
[] Yes /  [x] No /  [] No Caregiver Present Anticipatory grief assessment:  
[x] Normal  / [] Maladaptive ESAS Anxiety: Anxiety: 0 
 
ESAS Depression: Depression: 3 REVIEW OF SYSTEMS:  
 
The following systems were [x] reviewed / [] unable to be reviewed Systems: constitutional, ears/nose/mouth/throat, respiratory, gastrointestinal, genitourinary, musculoskeletal, integumentary, neurologic, psychiatric, endocrine. Positive findings noted below. Modified ESAS Completed by: provider Fatigue: 5 Drowsiness: 2 Depression: 3 Pain: 0 Anxiety: 0 Nausea: 0 Anorexia: 0 Dyspnea: 5 Best Well-Bein Constipation: No  
Other Problem (Comment): 0 PHYSICAL EXAM:  
 
Wt Readings from Last 3 Encounters:  
19 133 lb 3.2 oz (60.4 kg) 19 132 lb 14.4 oz (60.3 kg) 19 132 lb 14.4 oz (60.3 kg) Blood pressure 150/78, pulse 70, temperature 97.7 °F (36.5 °C), temperature source Oral, resp. rate 18, height 5' 10\" (1.778 m), weight 133 lb 3.2 oz (60.4 kg), SpO2 95 %. Last bowel movement: See Nursing Note Constitutional: Alert, oriented Eyes: pupils equal, anicteric ENMT: no nasal discharge, moist mucous membranes Cardiovascular: regular rhythm, distal pulses intact Respiratory: breathing not labored, symmetric Gastrointestinal: soft non-tender, +bowel sounds Musculoskeletal: no deformity, no tenderness to palpation Skin: warm, dry Neurologic: following commands, moving all extremities Psychiatric: full affect, no hallucinations Other: 
 
 
 HISTORY:  
 
Past Medical History:  
Diagnosis Date  Acquired hypothyroidism 3/5/2018  
 HTN (hypertension)  Hypercholesterolemia  Hyperlipidemia  Laryngeal squamous cell carcinoma ([facility] [address] 2016  Subclinical hypothyroidism Past Surgical History:  
Procedure Laterality Date  HX ORTHOPAEDIC Back surgery x3  IR INSERT TUNL CVC W PORT OVER 5 YEARS  3/14/2019 Family History Problem Relation Age of Onset  Dementia Mother  Alcohol abuse Father History reviewed, no pertinent family history. Social History Tobacco Use  Smoking status: Former Smoker  Smokeless tobacco: Never Used Substance Use Topics  Alcohol use: No  
 
Allergies Allergen Reactions  Morphine Rash  Lisinopril Other (comments)  
  dizziness  Propranolol Other (comments)  
  fatigue Current Outpatient Medications Medication Sig  pantoprazole (PROTONIX) 40 mg tablet Take 1 Tab by mouth daily.  lidocaine-prilocaine (EMLA) topical cream Apply  to affected area as needed for Pain.  ondansetron (ZOFRAN ODT) 4 mg disintegrating tablet Take 1 Tab by mouth every eight (8) hours as needed for Nausea.  folic acid (FOLVITE) 1 mg tablet Take 1 Tab by mouth daily.  ALPRAZolam (XANAX) 1 mg tablet TAKE ONE-HALF TO ONE TABLET BY MOUTH EVERY 12 HOURS AS NEEDED  
 levothyroxine (SYNTHROID) 50 mcg tablet Take 1 Tab by mouth Daily (before breakfast). For thyroid  pravastatin (PRAVACHOL) 40 mg tablet TAKE ONE TABLET BY MOUTH ONCE DAILY FOR CHOLESTEROL  amLODIPine (NORVASC) 5 mg tablet TAKE ONE TABLET BY MOUTH ONCE DAILY FOR BLOOD PRESSURE  
 dexamethasone (DECADRON) 0.5 mg/5 mL elixir Take 5 mL by mouth three (3) times daily for 30 days. No current facility-administered medications for this visit. Facility-Administered Medications Ordered in Other Visits Medication Dose Route Frequency  saline peripheral flush soln 10 mL  10 mL InterCATHeter PRN  
 sodium chloride 0.9% injection 10 mL  10 mL IntraVENous PRN  
 heparin (porcine) pf 300-500 Units  300-500 Units InterCATHeter PRN  
 0.9% sodium chloride infusion  25 mL/hr IntraVENous CONTINUOUS  
 
 
 
 LAB DATA REVIEWED:  
 
Lab Results Component Value Date/Time WBC 13.1 (H) 05/20/2019 08:06 AM  
 HGB 12.8 05/20/2019 08:06 AM  
 PLATELET 181 54/94/4393 08:06 AM  
 
Lab Results Component Value Date/Time Sodium 134 (L) 05/20/2019 08:06 AM  
 Potassium 3.9 05/20/2019 08:06 AM  
 Chloride 102 05/20/2019 08:06 AM  
 CO2 24 05/20/2019 08:06 AM  
 BUN 11 05/20/2019 08:06 AM  
 Creatinine 0.71 05/20/2019 08:06 AM  
 Calcium 8.5 05/20/2019 08:06 AM  
 Magnesium 1.6 10/18/2016 10:24 AM  
  
Lab Results Component Value Date/Time AST (SGOT) 22 05/20/2019 08:06 AM  
 Alk. phosphatase 73 05/20/2019 08:06 AM  
 Protein, total 7.9 05/20/2019 08:06 AM  
 Albumin 3.4 (L) 05/20/2019 08:06 AM  
 Globulin 4.5 (H) 05/20/2019 08:06 AM  
 
No results found for: INR, PTMR, PTP, PT1, PT2, APTT No results found for: IRON, FE, TIBC, IBCT, PSAT, FERR  CONTROLLED SUBSTANCES SAFETY ASSESSMENT (IF ON CONTROLLED SUBSTANCES):  
 
Reviewed opioid safety handout:  [] Yes   [] No 
 24 hour opioid dose >150mg morphine equivalent/day:  [] Yes   [] No 
Benzodiazepines:  [] Yes   [] No 
Sleep apnea:  [] Yes   [] No 
Urine Toxicology Testing within last 6 months:  [] Yes   [] No 
History of or new aberrant medication taking behaviors:  [] Yes   [] No 
Has Narcan been prescribed [] Yes   [] No 
 
   
 
Total time: 70m Counseling / coordination time:  
> 50% counseling / coordination?:

## 2019-05-20 NOTE — PATIENT INSTRUCTIONS
Dear Wilder Ontiveros , It was a pleasure seeing you today at [facility] office We will see you again in 2 months. This is the plan we talked about: 1. Acid reflux - You are on Zantac but still suffer from severe acid reflux. 
- This is helping you but you also have Protonix at home, keep it for now. 2. Swallowing - You have trouble swallowing because of food pipe narrowing from previous radiation treatments. - You did not try Reglan because the pharmacist said it interacts with some other medicine. But since stopping chemo, you are feeling better and eating small amounts. - You also gargle with warm water to break the thick mucous. 3. Fatigue and mood - You are feeling much better now that you are off chemotherapy. You are only on Immunotherapy. 4. ACP 
- We completed an AMD after much discussion. Your wife wanted you to have CPR but no intubation because of the fear that you will be kept on the machine and suffer. We talked about how if you want attempts at resuscitation, then you will be given the chance for a best possible outcome only if both CPR and intubation are offered. It is very difficult to advocate for just CPR without intubation in a non hospital setting. If you change your mind and want protection against intubation, then we can complete POST form next visit. But a true attempt at resuscitation in patients without respiratory failure should include both CPR and intubation for best possible outcomes. Please remember that your wife will be your advocate and make decisions to take you off the breathing machine at anytime. This is what you have shared with us about Advance Care Planning: 
 
  Primary Decision Maker: Yandel Humphrey Spouse - [phone] Advance Care Planning 5/20/2019 Patient's Healthcare Decision Maker is: Legal Next of Kin Confirm Advance Directive None Patient Would Like to Complete Advance Directive Yes The Palliative Medicine Team is here to support you and your family.   
 
 
 
Sincerely, 
 
 
Harini Rehman MD and the Palliative Medicine Team

## 2019-05-20 NOTE — PROGRESS NOTES
[facility] 
at [place] [address], [facility], [address] 
[phone] Follow-up Note Patient: Olimpia Regalado MRN: [id number]  SSN: [id number] YOB: 1945  Age: 75 y.o. Sex: male Diagnosis: 1. Metastatic adenocarcinoma of the lung Multifocal disease in the bone 2. Squamous cell carcinoma of the larynx:  
 T3 N0 M0 (Stage III) HPV status unknown Treatment: 1. Palliative chemotherapy Carboplatin, Alimta and Pembrolizumab - s/p 1 cycle Pembrolizumab monotherapy, cycle 2 2. Concurrent chemo/rads with cisplatin, completed 10/2016 Subjective:  
  
Olimpia Reaglado is a 75 y.o. male with a diagnosis of squamous cell laryngeal carcinoma. He was diagnosed by Dr. Alicia in July 2016. He has long history of pipe smoking. He quit all smoking more than 30 years ago. Mr. Rashaun Mo received concurrent radiation with weekly Cisplatin. CT shows resolving laryngeal mass. Laryngoscopy by Dr. Alicia on 1/19/2017 shows no tumor. Recent CT shows a questionable area in the spine. He had a NM bone scan which showed multiple lesions in the bone. Biopsy of the bone showed metastatic disease of lung primary. The tissue is insufficient for biomarker testing. He received the first cycle of palliative chemotherapy with Carboplatin, Alimta and Pembrolizumab. He was very fatigued and depressed after treatment and felt the chemotherapy was too much. Mr. Rashaun Mo did well with single agent Pembrolizumab. He is trying to regain his strength and is working on his appetite. Review of Systems:  
 
Constitutional: fatigue, depression Eyes: negative Ears, Nose, Mouth, Throat, and Face: mouth sores Respiratory: negative Cardiovascular: negative Gastrointestinal: poor taste Genitourinary:negative Integument/Breast: negative Hematologic/Lymphatic: negative Musculoskeletal:negative Neurological: negative Past Medical History:  
Diagnosis Date  Acquired hypothyroidism 3/5/2018  
 HTN (hypertension)  Hypercholesterolemia  Hyperlipidemia  Laryngeal squamous cell carcinoma ([facility] [place] [address] 2016  Subclinical hypothyroidism Past Surgical History:  
Procedure Laterality Date  HX ORTHOPAEDIC Back surgery x3  IR INSERT TUNL CVC W PORT OVER 5 YEARS  3/14/2019 Family History Problem Relation Age of Onset  Dementia Mother  Alcohol abuse Father Social History Tobacco Use  Smoking status: Former Smoker  Smokeless tobacco: Never Used Substance Use Topics  Alcohol use: No  
  
Prior to Admission medications Medication Sig Start Date End Date Taking? Authorizing Provider  
dexamethasone (DECADRON) 0.5 mg/5 mL elixir Take 5 mL by mouth three (3) times daily for 30 days. 4/29/19 5/29/19  Katiuska Olivarez NP  
pantoprazole (PROTONIX) 40 mg tablet Take 1 Tab by mouth daily. 4/22/19   Billy Terrazas MD  
lidocaine-prilocaine (EMLA) topical cream Apply  to affected area as needed for Pain. 3/27/19   Carlitos Braun MD  
ondansetron (ZOFRAN ODT) 4 mg disintegrating tablet Take 1 Tab by mouth every eight (8) hours as needed for Nausea. 3/27/19   Carlitos Braun MD  
folic acid (FOLVITE) 1 mg tablet Take 1 Tab by mouth daily. 3/27/19   Carlitos Braun MD  
ALPRAZolam Marcin Brisk) 1 mg tablet TAKE ONE-HALF TO ONE TABLET BY MOUTH EVERY 12 HOURS AS NEEDED 2/26/19   Landy Suazo MD  
levothyroxine (SYNTHROID) 50 mcg tablet Take 1 Tab by mouth Daily (before breakfast). For thyroid 2/26/19   Landy Suazo MD  
pravastatin (PRAVACHOL) 40 mg tablet TAKE ONE TABLET BY MOUTH ONCE DAILY FOR CHOLESTEROL 2/26/19   Landy Suazo MD  
amLODIPine (NORVASC) 5 mg tablet TAKE ONE TABLET BY MOUTH ONCE DAILY FOR BLOOD PRESSURE 2/5/19   Landy Suazo MD  
  
 
 
Allergies Allergen Reactions  Morphine Rash  Lisinopril Other (comments)  
  dizziness  Propranolol Other (comments)  
  fatigue Objective:  
 
Visit Vitals /68 (BP 1 Location: Left arm, BP Patient Position: Sitting) Pulse 79 Temp 97.9 °F (36.6 °C) (Oral) Resp 18 Ht 5' 10\" (1.778 m) Wt 132 lb 14.4 oz (60.3 kg) SpO2 98% BMI 19.07 kg/m² Pain Scale: 0 - No pain/10 Physical Exam: 
 
GENERAL: alert, cooperative HEENT: throat dry LYMPHATIC: Cervical, supraclavicular, and axillary nodes normal.  
THROAT & NECK: dry oral mucosa LUNG: clear to auscultation bilaterally HEART: regular rate and rhythm ABDOMEN: soft, non-tender EXTREMITIES: no cyanosis or edema SKIN: Normal. 
NEUROLOGIC: negative Lab Results Component Value Date/Time WBC 13.1 (H) 05/20/2019 08:06 AM  
 Hemoglobin (POC) 9.5 (L) 10/18/2016 10:31 AM  
 HGB 12.8 05/20/2019 08:06 AM  
 Hematocrit (POC) 28 (L) 10/18/2016 10:31 AM  
 HCT 37.2 05/20/2019 08:06 AM  
 PLATELET 819 52/96/9973 08:06 AM  
 MCV 89.0 05/20/2019 08:06 AM  
 
 
Lab Results Component Value Date/Time Sodium 134 (L) 05/20/2019 08:06 AM  
 Potassium 3.9 05/20/2019 08:06 AM  
 Chloride 102 05/20/2019 08:06 AM  
 CO2 24 05/20/2019 08:06 AM  
 Anion gap 8 05/20/2019 08:06 AM  
 Glucose 112 (H) 05/20/2019 08:06 AM  
 BUN 11 05/20/2019 08:06 AM  
 Creatinine 0.71 05/20/2019 08:06 AM  
 BUN/Creatinine ratio 15 05/20/2019 08:06 AM  
 GFR est AA >60 05/20/2019 08:06 AM  
 GFR est non-AA >60 05/20/2019 08:06 AM  
 Calcium 8.5 05/20/2019 08:06 AM  
 Bilirubin, total 0.3 05/20/2019 08:06 AM  
 AST (SGOT) 22 05/20/2019 08:06 AM  
 Alk. phosphatase 73 05/20/2019 08:06 AM  
 Protein, total 7.9 05/20/2019 08:06 AM  
 Albumin 3.4 (L) 05/20/2019 08:06 AM  
 Globulin 4.5 (H) 05/20/2019 08:06 AM  
 A-G Ratio 0.8 (L) 05/20/2019 08:06 AM  
 ALT (SGPT) 18 05/20/2019 08:06 AM  
 
 
 
 
CT Results (most recent): 
Results from Hospital Encounter encounter on 03/19/19 CT CHEST W CONT Narrative EXAM:  CT CHEST W CONT, CT ABD PELV W CONT INDICATION:   lung nodule. Laryngeal squamous cell carcinoma. Right iliac bone 
metastases confirmed on CT-guided biopsy 3/4/2019. COMPARISON: CT chest 1/8/2019, 11/18/2016. 7/6/2017 [place] TECHNIQUE:  
Multislice helical CT was performed from the thoracic inlet to the pubic 
symphysis was performed with 100 CC Isovue intravenous contrast administration. Oral contrast was not administered. Contiguous 5 mm axial images were 
reconstructed and lung and soft tissue windows were generated. Coronal and 
sagittal reformations were generated. CT dose reduction was achieved through the use of a standardized protocol 
tailored for this examination and automatic exposure control for dose 
modulation. [place] FINDINGS: 
CHEST: 
Thyroid: Unremarkable. [place] Mediastinum/ayaka: No pathologically enlarged mediastinal or hilar). Prominent 
pretracheal nodes are unchanged from prior. On image 2-26 these measure up to 
0.9 cm in short axis. Small hiatal hernia. [place] Heart/vessels: IJ port terminates at the cavoatrial junction. Mild 
atherosclerotic disease of the aorta and its branches. No thoracic aortic 
aneurysm. Moderate coronary artery calcifications. The heart is not enlarged. No 
pericardial effusion. [place] Lungs/Pleura: Severe underlying emphysema with scarring complicated evaluation 
of the lung parenchyma. In the anterior right upper lobe there is an area of 
consolidation with volume loss on image 4-26 which appears unchanged dating back 
to 7/19/2018. The area appears more solid when compared to 7/6/2017. Mild 
groundglass opacities with scarring along the right upper and lower lobes, image 4-44 do not appear significantly changed from 7/19/2018. Unchanged 0.9 cm 
lingular nodule, image 4-53 dating back to 7/6/2017. Small nodules along the 
right major fissure, image 4-38 are unchanged from 2017. Increased volume of a 
small right effusion. [place] Bones and soft tissues: Numerous sclerotic lesions throughout the spine and 
 within the sternum which do not appear significantly changed from prior. No 
vertebral body height collapse. ABDOMEN: 
Liver: Unremarkable. No focal lesion. [place] Gallbladder/Biliary: Unremarkable. No cholelithiasis or biliary dilation. Spleen: No splenomegaly or suspicious lesion. [place] Pancreas: Unremarkable. [place] Adrenals: Unremarkable. No nodule. [place] Kidneys: Symmetric enhancement. No suspicious mass, hydronephrosis, or 
nephrolithiasis. [place] Ureters: Unremarkable. [place] Bladder: Unremarkable. [place] Gastrointestinal tract: No evidence of obstruction or focal inflammation. The 
appendix is not well-visualized. [place] Peritoneum: No ascites or pneumoperitoneum. [place] Retroperitoneum: Moderate atherosclerotic disease of the abdominal aorta and its 
branches. No aneurysmal dilation. No lymphadenopathy. Reproductive System: Prostatomegaly. [place] Bones and soft tissues: There are numerous sclerotic lesions throughout the 
spine and pelvis. These appear comparable to the prior MR pelvis. [place] Impression IMPRESSION: 
Chest: 1. Severe underlying emphysema with scarring and bilateral lung 
nodules/scarring. No significant change dating back to July 2018. Continued 
attention on follow-up. 2.  No significant change in osseous metastatic disease. 3.  Increased small right effusion. Abdomen and pelvis: 1. Extensive osseous metastatic disease involving the spine and pelvis, 
comparable to prior studies. No vertebral body height collapse. MRI Results (most recent): 
Results from Hospital Encounter encounter on 02/21/19 MRI LUMB SPINE W WO CONT Narrative EXAM: MRI LUMB SPINE W WO CONT INDICATION: Abnormal bone scan. Malignant neoplasm of larynx, squamous cell 
carcinoma C 32.9. R 94.8. Back pain. COMPARISON: Radiographs 1/23/2013, bone scan 1/21/2019 TECHNIQUE: MR imaging of the lumbar spine was performed using the following 
sequences: sagittal T1, T2, STIR;  axial T1, T2 prior to and following contrast 
 administration. CONTRAST: 13 mL of [place]. FINDINGS: 
 
Conus position, morphology, signal and enhancement are normal. Vertebral body 
heights are normally maintained. There are several areas of abnormal bone signal consistent with osseous 
metastatic disease. These include the anterior T12 vertebral body measuring 10 
mm and 3 mm, the right anteroinferior L1 vertebral body measuring 5 mm, the mid 
anteroinferior L2 vertebral body measuring 6 mm, the left posterior L3 vertebral 
body measuring 12 mm and 6 mm, the posterior L5 vertebral body measuring 4 mm, 
the inferior L5 vertebral body measuring 12 mm, at the visualized superior right 
sacral wing measuring 9 mm, the left sacral wing measuring 6 mm, and the left 
posterior iliac bone measuring 9 mm and 6 mm. There is L2-3 retrolisthesis measuring 5 mm and L3-4 retrolisthesis measuring 2 
to 3 mm. There is a mild levoconvex lumbar curve centered at L3-4. No paraspinal soft tissue mass or enhancement abnormality is shown. Ankylosis of 
the visualized superior right SI joint is shown with bridging osteophyte 
formation. Small left SI joint osteophytes are also noted. T12-L1: Normal disc and facets. L1-L2: Mild disc space narrowing and diffuse disc bulging. Mild bilateral facet 
osteoarthrosis. No canal or foraminal stenosis. L2-L3: Moderate to severe disc space narrowing with mild to moderate diffuse 
disc bulging and bilateral facet osteoarthrosis. Mild canal stenosis and right 
foraminal stenosis. L3-L4: Moderate disc space narrowing and diffuse disc bulging. Mild to moderate 
bilateral facet osteoarthrosis. Moderate canal stenosis. Mild to moderate right 
and mild left foraminal stenosis. L4-L5: Moderate to severe disc space narrowing. Mild diffuse disc bulging. Mild 
to moderate bilateral facet osteoarthrosis. Mild to moderate canal stenosis. Mild to moderate right foraminal stenosis. L5-S1: Moderate to severe disc space narrowing. Mild to moderate diffuse disc 
bulging. Mild bilateral facet osteoarthrosis. No canal stenosis. Moderate left 
foraminal stenosis. Impression IMPRESSION: 
 
1. Multifocal osseous metastatic disease with lesion size measuring up to 12 mm. 2. Multilevel degenerative changes detailed by level above. Note L3-4 moderate 
canal stenosis. I reviewed the images personally. Abnormal sclerotic areas in multiple vertebrae Assessment: 1. Metastatic adenocarcinoma of the lung Numerous bone metastasis ECOG PS 1 Intent of Treatment - palliative Prognosis - poor The tissue from bone is insufficient for biomarker testing. No clear primary in the lung Receiving palliative chemotherapy Carboplatin, Alimta, Pembrolizumab - s/p 1 cycle Pembrolizumab - Cycle 2 Day 1 (carboplatin, alimta discontinued after cycle 1 d/t patient preference) He did not do well after his first treatment. Excessive fatigue, weakness, anorexia etc.  
I the interest of his poor health, I discontinued Carboplatin and Alimta I will continue single agent Pembrolizumab Tolerating well No side effects Blood counts are adequate Symptom management form reviewed and scanned into the EMR under Media. 2. Squamous cell carcinoma of the larynx:  
 T3 N0 M0 (Stage III) HPV status unknown Previously Completed concurrent chemo/rads 10/2016 Cisplatin weekly X 6 In remission 3. Protein calorie malnutrition, severe Dietician consult Protein supplementation 4. Mucositis - improved Dexamethasone mouth rinse Plan:  
 
 
> Continue Pembrolizumab  
> Following with Palliative Medicine 
> Liquid tumor NGS testing 
> Receiving Xgeva monthly  
> Follow-up in 3 weeks Signed by: Lamar Hung MD 
                   May 20, 2019 
 
 
 
 
CC. Baldemar Mohs, MD ([facility]) CCJenelle Peace MD 
CCJenelle Varner MD

## 2019-06-10 NOTE — PROGRESS NOTES
Identified pt with two pt identifiers(name and ). Reviewed record in preparation for visit and have obtained necessary documentation. Chief Complaint Patient presents with  Lung Cancer DC4 Pembrolizumab Farhat Faye Visit Vitals /67 (BP 1 Location: Left arm, BP Patient Position: Sitting) Pulse 80 Temp 97.7 °F (36.5 °C) (Oral) Resp 18 Ht 5' 10\" (1.778 m) Wt 129 lb (58.5 kg) SpO2 95% BMI 18.51 kg/m² Health Maintenance Due Topic  Shingrix Vaccine Age 50> (1 of 2)  Pneumococcal 65+ years (1 of 2 - PCV13)  GLAUCOMA SCREENING Q2Y Coordination of Care Questionnaire: 
:  
1) Have you been to an emergency room, urgent care, or hospitalized since your last visit? If yes, where when, and reason for visit? no  
 
 
2. Have seen or consulted any other health care provider since your last visit? If yes, where when, and reason for visit? NO 
 
 
3) Do you have an Advanced Directive/ Living Will in place? YES If yes, do we have a copy on file YES If no, would you like information NO Patient is accompanied by wife I have received verbal consent from Akiko Sheth to discuss any/all medical information while they are present in the room.

## 2019-06-10 NOTE — PROGRESS NOTES
[facility] Note:    0800 Pt arrived at [facility] ambulatory and in no distress for C4 keytruda and zometa. Assessment completed, no new complaints voiced. Port accessed, labs drawn. Pt seen at Dr. [facility] office    Medications received:  NS @ KVO  zometa 4 mg IV over 15 min  keytruda 200 mg IV over 30 min    Port flushed with NS and heparin and needle removed. Patient Vitals for the past 8 hrs:   Temp Pulse Resp BP SpO2   06/10/19 1157 -- 69 18 126/76 --   06/10/19 0821 97.6 °F (36.4 °C) 84 20 135/80 96 %         1200 Tolerated treatment well, no adverse reaction noted. D/Cd from [facility] ambulatory and in no distress accompanied by wife. Next appt 7/1/19    Recent Results (from the past 8 hour(s))   CBC WITH AUTOMATED DIFF    Collection Time: 06/10/19  8:09 AM   Result Value Ref Range    WBC 12.8 (H) 4.1 - 11.1 K/uL    RBC 4.53 4.10 - 5.70 M/uL    HGB 13.6 12.1 - 17.0 g/dL    HCT 40.0 36.6 - 50.3 %    MCV 88.3 80.0 - 99.0 FL    MCH 30.0 26.0 - 34.0 PG    MCHC 34.0 30.0 - 36.5 g/dL    RDW 14.2 11.5 - 14.5 %    PLATELET 608 279 - 467 K/uL    MPV 9.6 8.9 - 12.9 FL    NRBC 0.0 0  WBC    ABSOLUTE NRBC 0.00 0.00 - 0.01 K/uL    NEUTROPHILS 85 (H) 32 - 75 %    LYMPHOCYTES 5 (L) 12 - 49 %    MONOCYTES 5 5 - 13 %    EOSINOPHILS 4 0 - 7 %    BASOPHILS 1 0 - 1 %    IMMATURE GRANULOCYTES 0 0.0 - 0.5 %    ABS. NEUTROPHILS 11.0 (H) 1.8 - 8.0 K/UL    ABS. LYMPHOCYTES 0.6 (L) 0.8 - 3.5 K/UL    ABS. MONOCYTES 0.6 0.0 - 1.0 K/UL    ABS. EOSINOPHILS 0.5 (H) 0.0 - 0.4 K/UL    ABS. BASOPHILS 0.1 0.0 - 0.1 K/UL    ABS. IMM.  GRANS. 0.0 0.00 - 0.04 K/UL    DF AUTOMATED      RBC COMMENTS NORMOCYTIC, NORMOCHROMIC     METABOLIC PANEL, COMPREHENSIVE    Collection Time: 06/10/19  8:09 AM   Result Value Ref Range    Sodium 133 (L) 136 - 145 mmol/L    Potassium 4.1 3.5 - 5.1 mmol/L    Chloride 103 97 - 108 mmol/L    CO2 24 21 - 32 mmol/L    Anion gap 6 5 - 15 mmol/L    Glucose 133 (H) 65 - 100 mg/dL    BUN 14 6 - 20 MG/DL    Creatinine 0.78 0.70 - 1.30 MG/DL    BUN/Creatinine ratio 18 12 - 20      GFR est AA >60 >60 ml/min/1.73m2    GFR est non-AA >60 >60 ml/min/1.73m2    Calcium 8.9 8.5 - 10.1 MG/DL    Bilirubin, total 0.3 0.2 - 1.0 MG/DL    ALT (SGPT) 17 12 - 78 U/L    AST (SGOT) 23 15 - 37 U/L    Alk.  phosphatase 66 45 - 117 U/L    Protein, total 7.9 6.4 - 8.2 g/dL    Albumin 3.6 3.5 - 5.0 g/dL    Globulin 4.3 (H) 2.0 - 4.0 g/dL    A-G Ratio 0.8 (L) 1.1 - 2.2

## 2019-06-10 NOTE — PROGRESS NOTES
[address] [address], [facility] [place] [facility], [address] 
[phone] Follow-up Note Patient: Doris Carey MRN: [id number]  SSN: [id number] YOB: 1945  Age: 75 y.o. Sex: male Diagnosis: 1. Metastatic adenocarcinoma of the lung Multifocal disease in the bone 2. Squamous cell carcinoma of the larynx:  
 T3 N0 M0 (Stage III) HPV status unknown Treatment: 1. Palliative chemotherapy Carboplatin, Alimta and Pembrolizumab - s/p 1 cycle Pembrolizumab monotherapy, cycle 3 day 1 2. Concurrent chemo/rads with cisplatin, completed 10/2016 Subjective:  
  
Doris Carey is a 75 y.o. male with a diagnosis of squamous cell laryngeal carcinoma. He was diagnosed by Dr. Alicia in July 2016. He has long history of pipe smoking. He quit all smoking more than 30 years ago. Mr. Zulemya Gomez received concurrent radiation with weekly Cisplatin. CT shows resolving laryngeal mass. Laryngoscopy by Dr. Alicia on 1/19/2017 shows no tumor. Recent CT shows a questionable area in the spine. He had a NM bone scan which showed multiple lesions in the bone. Biopsy of the bone showed metastatic disease of lung primary. The tissue is insufficient for biomarker testing. He received the first cycle of palliative chemotherapy with Carboplatin, Alimta and Pembrolizumab. He was very fatigued and depressed after treatment and felt the chemotherapy was too much. Mr. Zuleyma Gomez did well with single agent Pembrolizumab. He is trying to regain his strength and is working on his appetite. Review of Systems:  
 
Constitutional: fatigue, depression Eyes: negative Ears, Nose, Mouth, Throat, and Face: mouth sores Respiratory: negative Cardiovascular: negative Gastrointestinal: poor taste Genitourinary:negative Integument/Breast: negative Hematologic/Lymphatic: negative Musculoskeletal:negative Neurological: negative Past Medical History:  
Diagnosis Date  Acquired hypothyroidism 3/5/2018  
 HTN (hypertension)  Hypercholesterolemia  Hyperlipidemia  Laryngeal squamous cell carcinoma ([facility] [address] 2016  Subclinical hypothyroidism Past Surgical History:  
Procedure Laterality Date  HX ORTHOPAEDIC Back surgery x3  IR INSERT TUNL CVC W PORT OVER 5 YEARS  3/14/2019 Family History Problem Relation Age of Onset  Dementia Mother  Alcohol abuse Father Social History Tobacco Use  Smoking status: Former Smoker  Smokeless tobacco: Never Used Substance Use Topics  Alcohol use: No  
  
Prior to Admission medications Medication Sig Start Date End Date Taking? Authorizing Provider  
pantoprazole (PROTONIX) 40 mg tablet Take 1 Tab by mouth daily. 4/22/19  Yes Lelia Baptiste MD  
lidocaine-prilocaine (EMLA) topical cream Apply  to affected area as needed for Pain. 3/27/19  Yes Elijah Nielsen MD  
folic acid (FOLVITE) 1 mg tablet Take 1 Tab by mouth daily. 3/27/19  Yes Elijah Nielsen MD  
ALPRAZolam Niki Kanans) 1 mg tablet TAKE ONE-HALF TO ONE TABLET BY MOUTH EVERY 12 HOURS AS NEEDED 2/26/19  Yes Gurdeep Gomez MD  
levothyroxine (SYNTHROID) 50 mcg tablet Take 1 Tab by mouth Daily (before breakfast). For thyroid 2/26/19  Yes Gurdeep Gomez MD  
pravastatin (PRAVACHOL) 40 mg tablet TAKE ONE TABLET BY MOUTH ONCE DAILY FOR CHOLESTEROL 2/26/19  Yes Gurdeep Gomez MD  
amLODIPine (NORVASC) 5 mg tablet TAKE ONE TABLET BY MOUTH ONCE DAILY FOR BLOOD PRESSURE 2/5/19  Yes Gurdeep Gomez MD  
ondansetron (ZOFRAN ODT) 4 mg disintegrating tablet Take 1 Tab by mouth every eight (8) hours as needed for Nausea. 3/27/19   Elijah Nielsen MD  
  
 
 
Allergies Allergen Reactions  Morphine Rash  Lisinopril Other (comments)  
  dizziness  Propranolol Other (comments)  
  fatigue Objective:  
 
Visit Vitals /67 (BP 1 Location: Left arm, BP Patient Position: Sitting) Pulse 80 Temp 97.7 °F (36.5 °C) (Oral) Resp 18 Ht 5' 10\" (1.778 m) Wt 129 lb (58.5 kg) SpO2 95% BMI 18.51 kg/m² Pain Scale: 0 - No pain/10 Physical Exam: 
 
GENERAL: alert, cooperative HEENT: throat dry LYMPHATIC: Cervical, supraclavicular, and axillary nodes normal.  
THROAT & NECK: dry oral mucosa LUNG: clear to auscultation bilaterally HEART: regular rate and rhythm ABDOMEN: soft, non-tender EXTREMITIES: no cyanosis or edema SKIN: Normal. 
NEUROLOGIC: negative Lab Results Component Value Date/Time WBC 12.8 (H) 06/10/2019 08:09 AM  
 Hemoglobin (POC) 9.5 (L) 10/18/2016 10:31 AM  
 HGB 13.6 06/10/2019 08:09 AM  
 Hematocrit (POC) 28 (L) 10/18/2016 10:31 AM  
 HCT 40.0 06/10/2019 08:09 AM  
 PLATELET 759 11/85/9557 08:09 AM  
 MCV 88.3 06/10/2019 08:09 AM  
 
 
Lab Results Component Value Date/Time Sodium 133 (L) 06/10/2019 08:09 AM  
 Potassium 4.1 06/10/2019 08:09 AM  
 Chloride 103 06/10/2019 08:09 AM  
 CO2 24 06/10/2019 08:09 AM  
 Anion gap 6 06/10/2019 08:09 AM  
 Glucose 133 (H) 06/10/2019 08:09 AM  
 BUN 14 06/10/2019 08:09 AM  
 Creatinine 0.78 06/10/2019 08:09 AM  
 BUN/Creatinine ratio 18 06/10/2019 08:09 AM  
 GFR est AA >60 06/10/2019 08:09 AM  
 GFR est non-AA >60 06/10/2019 08:09 AM  
 Calcium 8.9 06/10/2019 08:09 AM  
 Bilirubin, total 0.3 06/10/2019 08:09 AM  
 AST (SGOT) 23 06/10/2019 08:09 AM  
 Alk. phosphatase 66 06/10/2019 08:09 AM  
 Protein, total 7.9 06/10/2019 08:09 AM  
 Albumin 3.6 06/10/2019 08:09 AM  
 Globulin 4.3 (H) 06/10/2019 08:09 AM  
 A-G Ratio 0.8 (L) 06/10/2019 08:09 AM  
 ALT (SGPT) 17 06/10/2019 08:09 AM  
 
 
 
 
CT Results (most recent): 
Results from Hospital Encounter encounter on 03/19/19 CT CHEST W CONT Narrative EXAM:  CT CHEST W CONT, CT ABD PELV W CONT INDICATION:   lung nodule. Laryngeal squamous cell carcinoma. Right iliac bone metastases confirmed on CT-guided biopsy 3/4/2019. COMPARISON: CT chest 1/8/2019, 11/18/2016. 7/6/2017 Bonnie Perkinso TECHNIQUE:  
Multislice helical CT was performed from the thoracic inlet to the pubic 
symphysis was performed with 100 CC Isovue intravenous contrast administration. Oral contrast was not administered. Contiguous 5 mm axial images were 
reconstructed and lung and soft tissue windows were generated. Coronal and 
sagittal reformations were generated. CT dose reduction was achieved through the use of a standardized protocol 
tailored for this examination and automatic exposure control for dose 
modulation. Bonnie Gregorioo FINDINGS: 
CHEST: 
Thyroid: Unremarkable. Bonnie Barrio Mediastinum/ayaka: No pathologically enlarged mediastinal or hilar). Prominent 
pretracheal nodes are unchanged from prior. On image 2-26 these measure up to 
0.9 cm in short axis. Small hiatal hernia. Bonnie Barrio Heart/vessels: IJ port terminates at the cavoatrial junction. Mild 
atherosclerotic disease of the aorta and its branches. No thoracic aortic 
aneurysm. Moderate coronary artery calcifications. The heart is not enlarged. No 
pericardial effusion. Bonnie Barrio Lungs/Pleura: Severe underlying emphysema with scarring complicated evaluation 
of the lung parenchyma. In the anterior right upper lobe there is an area of 
consolidation with volume loss on image 4-26 which appears unchanged dating back 
to 7/19/2018. The area appears more solid when compared to 7/6/2017. Mild 
groundglass opacities with scarring along the right upper and lower lobes, image 4-44 do not appear significantly changed from 7/19/2018. Unchanged 0.9 cm 
lingular nodule, image 4-53 dating back to 7/6/2017. Small nodules along the 
right major fissure, image 4-38 are unchanged from 2017. Increased volume of a 
small right effusion. [facility] Bones and soft tissues: Numerous sclerotic lesions throughout the spine and 
within the sternum which do not appear significantly changed from prior.  No 
 vertebral body height collapse. ABDOMEN: 
Liver: Unremarkable. No focal lesion. Codie Billy Gallbladder/Biliary: Unremarkable. No cholelithiasis or biliary dilation. Spleen: No splenomegaly or suspicious lesion. Codie Billy Pancreas: Unremarkable. Codie Billy Adrenals: Unremarkable. No nodule. Codie Billy Kidneys: Symmetric enhancement. No suspicious mass, hydronephrosis, or 
nephrolithiasis. Codie Billy Ureters: Unremarkable. Codie Billy Bladder: Unremarkable. Codie Billy Gastrointestinal tract: No evidence of obstruction or focal inflammation. The 
appendix is not well-visualized. Codie Billy Peritoneum: No ascites or pneumoperitoneum. Codie Billy Retroperitoneum: Moderate atherosclerotic disease of the abdominal aorta and its 
branches. No aneurysmal dilation. No lymphadenopathy. Reproductive System: Prostatomegaly. Codie Billy Bones and soft tissues: There are numerous sclerotic lesions throughout the 
spine and pelvis. These appear comparable to the prior MR pelvis. Codie Billy Impression IMPRESSION: 
Chest: 1. Severe underlying emphysema with scarring and bilateral lung 
nodules/scarring. No significant change dating back to July 2018. Continued 
attention on follow-up. 2.  No significant change in osseous metastatic disease. 3.  Increased small right effusion. Abdomen and pelvis: 1. Extensive osseous metastatic disease involving the spine and pelvis, 
comparable to prior studies. No vertebral body height collapse. MRI Results (most recent): 
Results from Hospital Encounter encounter on 02/21/19 MRI LUMB SPINE W WO CONT Narrative EXAM: MRI LUMB SPINE W WO CONT INDICATION: Abnormal bone scan. Malignant neoplasm of larynx, squamous cell 
carcinoma C 32.9. R 94.8. Back pain. COMPARISON: Radiographs 1/23/2013, bone scan 1/21/2019 TECHNIQUE: MR imaging of the lumbar spine was performed using the following 
sequences: sagittal T1, T2, STIR;  axial T1, T2 prior to and following contrast 
administration. CONTRAST: 13 mL of Dotarem.  
 
FINDINGS: 
 
 Conus position, morphology, signal and enhancement are normal. Vertebral body 
heights are normally maintained. There are several areas of abnormal bone signal consistent with osseous 
metastatic disease. These include the anterior T12 vertebral body measuring 10 
mm and 3 mm, the right anteroinferior L1 vertebral body measuring 5 mm, the mid 
anteroinferior L2 vertebral body measuring 6 mm, the left posterior L3 vertebral 
body measuring 12 mm and 6 mm, the posterior L5 vertebral body measuring 4 mm, 
the inferior L5 vertebral body measuring 12 mm, at the visualized superior right 
sacral wing measuring 9 mm, the left sacral wing measuring 6 mm, and the left 
posterior iliac bone measuring 9 mm and 6 mm. There is L2-3 retrolisthesis measuring 5 mm and L3-4 retrolisthesis measuring 2 
to 3 mm. There is a mild levoconvex lumbar curve centered at L3-4. No paraspinal soft tissue mass or enhancement abnormality is shown. Ankylosis of 
the visualized superior right SI joint is shown with bridging osteophyte 
formation. Small left SI joint osteophytes are also noted. T12-L1: Normal disc and facets. L1-L2: Mild disc space narrowing and diffuse disc bulging. Mild bilateral facet 
osteoarthrosis. No canal or foraminal stenosis. L2-L3: Moderate to severe disc space narrowing with mild to moderate diffuse 
disc bulging and bilateral facet osteoarthrosis. Mild canal stenosis and right 
foraminal stenosis. L3-L4: Moderate disc space narrowing and diffuse disc bulging. Mild to moderate 
bilateral facet osteoarthrosis. Moderate canal stenosis. Mild to moderate right 
and mild left foraminal stenosis. L4-L5: Moderate to severe disc space narrowing. Mild diffuse disc bulging. Mild 
to moderate bilateral facet osteoarthrosis. Mild to moderate canal stenosis. Mild to moderate right foraminal stenosis. L5-S1: Moderate to severe disc space narrowing. Mild to moderate diffuse disc bulging. Mild bilateral facet osteoarthrosis. No canal stenosis. Moderate left 
foraminal stenosis. Impression IMPRESSION: 
 
1. Multifocal osseous metastatic disease with lesion size measuring up to 12 mm. 2. Multilevel degenerative changes detailed by level above. Note L3-4 moderate 
canal stenosis. I reviewed the images personally. Abnormal sclerotic areas in multiple vertebrae Assessment: 1. Metastatic adenocarcinoma of the lung Numerous bone metastasis ECOG PS 1 Intent of Treatment - palliative Prognosis - poor The tissue from bone is insufficient for biomarker testing. No clear primary in the lung Receiving palliative chemotherapy Carboplatin, Alimta, Pembrolizumab - s/p 1 cycle Pembrolizumab - Cycle 3 Day 1 (carboplatin, alimta discontinued after cycle 1 d/t patient preference) He did not do well after his first treatment. Excessive fatigue, weakness, anorexia etc.  
I the interest of his poor health, I discontinued Carboplatin and Alimta I will continue single agent Pembrolizumab Tolerating well No side effects Blood counts are adequate Symptom management form reviewed and scanned into the EMR under Media. 2. Squamous cell carcinoma of the larynx:  
 T3 N0 M0 (Stage III) HPV status unknown Previously Completed concurrent chemo/rads 10/2016 Cisplatin weekly X 6 In remission 3. Protein calorie malnutrition, severe Dietician consult Protein supplementation Plan:  
 
 
> Continue Pembrolizumab  
> Following with Palliative Medicine 
> Liquid tumor NGS testing 
> Receiving Xgeva monthly 
> CT and bone scan  
> Follow-up in 3 weeks Signed by: Ranjan Brasher MD 
                   Lizbet 10, 2019 
 
 
 
 
CC. Gaynell Hodgkins, MD ([facility]) CC. Drew Hwang MD 
CC.  Aspen Flores MD

## 2019-07-01 NOTE — PROGRESS NOTES
2001 [facility]  at [facility] [address], [address] [facility]  [phone]       Follow-up Note        Patient: Zuleika Humphreys MRN: [id number]  SSN: [id number]    YOB: 1945  Age: 75 y.o. Sex: male        Diagnosis:     1. Metastatic adenocarcinoma of the lung   Multifocal disease in the bone    2. 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 Palliative chemotherapy   Carboplatin, Alimta and Pembrolizumab - s/p 1 cycle    Pembrolizumab monotherapy, s/p 3 cycles  2. Concurrent chemo/rads with cisplatin, completed 10/2016    Subjective:      Zuleika Humphreys is a 75 y.o. male with a diagnosis of squamous cell laryngeal carcinoma. He was diagnosed by Dr. Alicia in July 2016. He has long history of pipe smoking. He quit all smoking more than 30 years ago. Mr. Parker Langston received concurrent radiation with weekly Cisplatin. CT shows resolving laryngeal mass. Laryngoscopy by Dr. Alicia on 1/19/2017 shows no tumor. Recent CT shows a questionable area in the spine. He had a NM bone scan which showed multiple lesions in the bone. Biopsy of the bone showed metastatic disease of lung primary. The tissue is insufficient for biomarker testing. He received the first cycle of palliative chemotherapy with Carboplatin, Alimta and Pembrolizumab. He was very fatigued and depressed after treatment and felt the chemotherapy was too much. I switched his therapy to Pembro monotherapy. Mr. Parker Langston has been experiencing dyspnea and dry cough since last Wednesday. Appetite is decreased.        Review of Systems:     Constitutional: fatigue, depression  Eyes: negative  Ears, Nose, Mouth, Throat, and Face: mouth sores  Respiratory: SOB and cough  Cardiovascular: negative  Gastrointestinal: poor taste  Genitourinary:negative  Integument/Breast: negative  Hematologic/Lymphatic: negative  Musculoskeletal:negative  Neurological: negative      Past Medical History:   Diagnosis Date    Acquired hypothyroidism 3/5/2018    HTN (hypertension)     Hypercholesterolemia     Hyperlipidemia     Laryngeal squamous cell carcinoma ([facility] [address] 2016    Subclinical hypothyroidism      Past Surgical History:   Procedure Laterality Date    HX ORTHOPAEDIC      Back surgery x3    IR INSERT TUNL CVC W PORT OVER 5 YEARS  3/14/2019      Family History   Problem Relation Age of Onset    Dementia Mother     Alcohol abuse Father      Social History     Tobacco Use    Smoking status: Former Smoker    Smokeless tobacco: Never Used   Substance Use Topics    Alcohol use: No      Prior to Admission medications    Medication Sig Start Date End Date Taking? Authorizing Provider   methylPREDNISolone (MEDROL DOSEPACK) 4 mg tablet Taper as directed 7/1/19  Yes Bijal Bruner NP   pantoprazole (PROTONIX) 40 mg tablet Take 1 Tab by mouth daily. 4/22/19  Yes Yunior Hernandez MD   lidocaine-prilocaine (EMLA) topical cream Apply  to affected area as needed for Pain. 3/27/19  Yes Domitila Alvarez MD   ondansetron (ZOFRAN ODT) 4 mg disintegrating tablet Take 1 Tab by mouth every eight (8) hours as needed for Nausea. 3/27/19  Yes Domitila Alvarez MD   folic acid (FOLVITE) 1 mg tablet Take 1 Tab by mouth daily. 3/27/19  Yes Domitila Alvarez MD   ALPRAZolam Marinoaldene Halsted) 1 mg tablet TAKE ONE-HALF TO ONE TABLET BY MOUTH EVERY 12 HOURS AS NEEDED 2/26/19  Yes Pipo Sanders MD   levothyroxine (SYNTHROID) 50 mcg tablet Take 1 Tab by mouth Daily (before breakfast).  For thyroid 2/26/19  Yes Pipo Sanders MD   pravastatin (PRAVACHOL) 40 mg tablet TAKE ONE TABLET BY MOUTH ONCE DAILY FOR CHOLESTEROL 2/26/19  Yes Pipo Sanders MD   amLODIPine (NORVASC) 5 mg tablet TAKE ONE TABLET BY MOUTH ONCE DAILY FOR BLOOD PRESSURE 2/5/19  Yes Pipo Sanders MD          Allergies   Allergen Reactions    Morphine Rash    Lisinopril Other (comments)     dizziness    Propranolol Other (comments)     fatigue           Objective:     Visit Vitals  /83 (BP 1 Location: Left arm, BP Patient Position: Sitting)   Pulse 93   Temp 97.4 °F (36.3 °C) (Oral)   Resp 26   Ht 5' 10\" (1.778 m)   Wt 133 lb 12.8 oz (60.7 kg)   SpO2 93%   BMI 19.20 kg/m²       Pain Scale: 0 - No pain/10      Physical Exam:    GENERAL: alert, cooperative  HEENT: throat dry  LYMPHATIC: Cervical, supraclavicular, and axillary nodes normal.   THROAT & NECK: dry oral mucosa  LUNG: clear to auscultation bilaterally  HEART: regular rate and rhythm  ABDOMEN: soft, non-tender  EXTREMITIES: no cyanosis or edema  SKIN: Normal.  NEUROLOGIC: negative      Lab Results   Component Value Date/Time    WBC 13.2 (H) 07/01/2019 08:16 AM    Hemoglobin (POC) 9.5 (L) 10/18/2016 10:31 AM    HGB 12.4 07/01/2019 08:16 AM    Hematocrit (POC) 39 07/01/2019 08:19 AM    HCT 36.8 07/01/2019 08:16 AM    PLATELET 570 09/12/0472 08:16 AM    MCV 87.4 07/01/2019 08:16 AM       Lab Results   Component Value Date/Time    Sodium 134 (L) 07/01/2019 08:16 AM    Potassium 3.9 07/01/2019 08:16 AM    Chloride 103 07/01/2019 08:16 AM    CO2 22 07/01/2019 08:16 AM    Anion gap 9 07/01/2019 08:16 AM    Glucose 170 (H) 07/01/2019 08:16 AM    BUN 11 07/01/2019 08:16 AM    Creatinine 0.74 07/01/2019 08:16 AM    BUN/Creatinine ratio 15 07/01/2019 08:16 AM    GFR est AA >60 07/01/2019 08:16 AM    GFR est non-AA >60 07/01/2019 08:16 AM    Calcium 8.0 (L) 07/01/2019 08:16 AM    Bilirubin, total 0.3 07/01/2019 08:16 AM    AST (SGOT) 19 07/01/2019 08:16 AM    Alk.  phosphatase 64 07/01/2019 08:16 AM    Protein, total 7.7 07/01/2019 08:16 AM    Albumin 3.0 (L) 07/01/2019 08:16 AM    Albumin 3.1 (L) 07/01/2019 08:16 AM    Globulin 4.7 (H) 07/01/2019 08:16 AM    A-G Ratio 0.6 (L) 07/01/2019 08:16 AM    ALT (SGPT) 19 07/01/2019 08:16 AM           CT Results (most recent):  Results from Hospital Encounter encounter on 06/20/19   CT CHEST W CONT    Narrative EXAM: CT CHEST ABDOMEN PELVIS WITH CONTRAST  INDICATION: Malignant neoplasm of the larynx unspecified, restaging disease  COMPARISON: 3/19/2019. CONTRAST: 100 mL of Isovue-370. TECHNIQUE:   Multislice helical CT was performed from the thoracic inlet to the iliac crest  during uneventful rapid bolus intravenous contrast administration. Oral contrast  was also administered. Contiguous 5 mm axial images were reconstructed and lung  and soft tissue windows were generated. Coronal and sagittal reformations were  generated. CT dose reduction was achieved through use of a standardized protocol  tailored for this examination and automatic exposure control for dose  modulation. FINDINGS:  There is a right jugular Port-A-Cath with tip in the superior vena cava. LOWER  NECK:The visualized portions of the thyroid and structures of the lower neck are  within normal limits. CHEST:  Lungs: There are changes of emphysema throughout the lungs with scarring in the  right lung apex and honeycombing consistent with fibrosis in the lower lobes. There is a small focal area of pleural-based consolidation in the anterior right  upper lobe which has decreased slightly. There is a tiny calcified granuloma in  the lingula and there is also a small 2 to 3 mm noncalcified nodule in the  periphery of the left upper lobe (series 4 image 23) which is unchanged. Pleura: There is a right pleural effusion which is unchanged. Aorta: The aorta enhances normally with no evidence of aneurysm or dissection. There is coronary artery calcification. Mediastinum: There is no mediastinal or hilar adenopathy or mass. Bones and soft tissues: The bones and soft tissues of the chest wall are normal.  ABDOMEN:  Liver: The liver is normal in size and contour with no focal abnormality. Gallbladder and bile ducts: There is no calcified gallstone or biliary  dilatation. Spleen: No abnormality. Pancreas: No abnormality.   Adrenal glands: No abnormality. Kidneys: No abnormality. BOWEL AND MESENTERY: The visualized small bowel is normal..  There is no  mesenteric mass or adenopathy. The appendix is not visualized. There is stool  in the colon. PERITONEUM: There is no ascites or free intraperitoneal air. RETROPERITONEUM: The aorta is atherosclerotic and tapers without aneurysm. There  is no retroperitoneal adenopathy or mass. There is no pelvic mass or adenopathy. BONES AND SOFT TISSUES: There are sclerotic lesions throughout the spine and  within the sternum which are unchanged. Impression IMPRESSION:   1. Sclerotic bone metastases unchanged. 2. No other evidence of metastatic disease. 3. Severe emphysema. 4. Anterior right upper lobe consolidation and small left lung nodule unchanged. 5. Right pleural effusion unchanged. 6. Atherosclerotic abdominal aorta without aneurysm. 7. Coronary artery calcification. MRI Results (most recent):  Results from [place] encounter on 02/21/19   MRI LUMB SPINE W WO CONT    Narrative EXAM: MRI LUMB SPINE W WO CONT    INDICATION: Abnormal bone scan. Malignant neoplasm of larynx, squamous cell  carcinoma C 32.9. R 94.8. Back pain. COMPARISON: Radiographs 1/23/2013, bone scan 1/21/2019    TECHNIQUE: MR imaging of the lumbar spine was performed using the following  sequences: sagittal T1, T2, STIR;  axial T1, T2 prior to and following contrast  administration. CONTRAST: 13 mL of Dotarem. FINDINGS:    Conus position, morphology, signal and enhancement are normal. Vertebral body  heights are normally maintained. There are several areas of abnormal bone signal consistent with osseous  metastatic disease.  These include the anterior T12 vertebral body measuring 10  mm and 3 mm, the right anteroinferior L1 vertebral body measuring 5 mm, the mid  anteroinferior L2 vertebral body measuring 6 mm, the left posterior L3 vertebral  body measuring 12 mm and 6 mm, the posterior L5 vertebral body measuring 4 mm,  the inferior L5 vertebral body measuring 12 mm, at the visualized superior right  sacral wing measuring 9 mm, the left sacral wing measuring 6 mm, and the left  posterior iliac bone measuring 9 mm and 6 mm. There is L2-3 retrolisthesis measuring 5 mm and L3-4 retrolisthesis measuring 2  to 3 mm. There is a mild levoconvex lumbar curve centered at L3-4. No paraspinal soft tissue mass or enhancement abnormality is shown. Ankylosis of  the visualized superior right SI joint is shown with bridging osteophyte  formation. Small left SI joint osteophytes are also noted. T12-L1: Normal disc and facets. L1-L2: Mild disc space narrowing and diffuse disc bulging. Mild bilateral facet  osteoarthrosis. No canal or foraminal stenosis. L2-L3: Moderate to severe disc space narrowing with mild to moderate diffuse  disc bulging and bilateral facet osteoarthrosis. Mild canal stenosis and right  foraminal stenosis. L3-L4: Moderate disc space narrowing and diffuse disc bulging. Mild to moderate  bilateral facet osteoarthrosis. Moderate canal stenosis. Mild to moderate right  and mild left foraminal stenosis. L4-L5: Moderate to severe disc space narrowing. Mild diffuse disc bulging. Mild  to moderate bilateral facet osteoarthrosis. Mild to moderate canal stenosis. Mild to moderate right foraminal stenosis. L5-S1: Moderate to severe disc space narrowing. Mild to moderate diffuse disc  bulging. Mild bilateral facet osteoarthrosis. No canal stenosis. Moderate left  foraminal stenosis. Impression IMPRESSION:    1. Multifocal osseous metastatic disease with lesion size measuring up to 12 mm. 2. Multilevel degenerative changes detailed by level above. Note L3-4 moderate  canal stenosis. I reviewed the images personally. Abnormal sclerotic areas in multiple vertebrae        Assessment:     1.  Metastatic adenocarcinoma of the lung   Numerous bone metastasis    ECOG PS 1  Intent of Treatment - palliative  Prognosis - poor    The tissue from bone is insufficient for biomarker testing. No clear primary in the lung    Receiving palliative chemotherapy   Carboplatin, Alimta, Pembrolizumab - s/p 1 cycle    carboplatin, alimta discontinued after cycle 1 d/t side effects   Pembrolizumab - s/p 3 Cycles    Hold cycle 4 d/t SOB  ? COPD exacerbation  Medrol dose pack    Blood counts are adequate  Symptom management form reviewed and scanned into the EMR under Media. 2. 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 Protein calorie malnutrition, severe    Dietician consult  Protein supplementation      4. Shortness of breath    ? COPD or PE  CTA chest today        Plan:       > Hold Pembrolizumab today  > CTA of the chest today  > medrol dose pack   > Following with Palliative Medicine  > Liquid tumor NGS testing  > Receiving Xgeva monthly  > Follow-up in 3 weeks        Signed by: Davie Reza MD                     July 1, 2019          CC. Matti Finnegan MD ([facility])  CC. Rickey Patel MD  CC.  Duane Hearn MD

## 2019-07-01 NOTE — PROGRESS NOTES
Chief Complaint   Patient presents with    Lung Cancer     C5 D1 Pembrolizumab     Pt presents today for follow up of Metastatic adenocarcinoma of lung with bone mets. Denies pain. Increased SOB with exertion since Wednesday 6-26-19, resolves somewhat with rest. Occasional dry cough unable to expel mucous in back of throat. Denies nausea. Decreased appetite this week. Trying to force food and fluids. Visit Vitals  /83 (BP 1 Location: Left arm, BP Patient Position: Sitting)   Pulse 93   Temp 97.4 °F (36.3 °C) (Oral)   Resp 26   Ht 5' 10\" (1.778 m)   Wt 133 lb 12.8 oz (60.7 kg)   SpO2 93%   BMI 19.20 kg/m²       . 1. Have you been to the ER, urgent care clinic since your last visit? Hospitalized since your last visit? NO    2. Have you seen or consulted any other health care providers outside of the [address] [place] [address] since your last visit? Include any pap smears or colon screening.  NO

## 2019-07-01 NOTE — PROGRESS NOTES
Pt arrived to [facility] ambulatory in no acute distress at 0805 for [facility] C5/Zometa.  Assessment unremarkable except increased weakness and SOB. R chest port accessed without issue and positive blood return noted.  Labs obtained, CBC, TSH, LFT, Renal Panel after ca low on Chem8. Visit Vitals  /85 (BP 1 Location: Left arm, BP Patient Position: Sitting)   Pulse 91   Temp 97.6 °F (36.4 °C)   Resp 18   Ht 5' 10\" (1.778 m)   Wt 60.7 kg (133 lb 14.4 oz)   SpO2 96%   BMI 19.21 kg/m²     Recent Results (from the past 12 hour(s))   CBC WITH AUTOMATED DIFF    Collection Time: 07/01/19  8:16 AM   Result Value Ref Range    WBC 13.2 (H) 4.1 - 11.1 K/uL    RBC 4.21 4.10 - 5.70 M/uL    HGB 12.4 12.1 - 17.0 g/dL    HCT 36.8 36.6 - 50.3 %    MCV 87.4 80.0 - 99.0 FL    MCH 29.5 26.0 - 34.0 PG    MCHC 33.7 30.0 - 36.5 g/dL    RDW 13.6 11.5 - 14.5 %    PLATELET 387 610 - 459 K/uL    MPV 9.5 8.9 - 12.9 FL    NRBC 0.0 0  WBC    ABSOLUTE NRBC 0.00 0.00 - 0.01 K/uL    NEUTROPHILS 90 (H) 32 - 75 %    LYMPHOCYTES 3 (L) 12 - 49 %    MONOCYTES 4 (L) 5 - 13 %    EOSINOPHILS 2 0 - 7 %    BASOPHILS 0 0 - 1 %    IMMATURE GRANULOCYTES 1 (H) 0.0 - 0.5 %    ABS. NEUTROPHILS 11.9 (H) 1.8 - 8.0 K/UL    ABS. LYMPHOCYTES 0.4 (L) 0.8 - 3.5 K/UL    ABS. MONOCYTES 0.5 0.0 - 1.0 K/UL    ABS. EOSINOPHILS 0.3 0.0 - 0.4 K/UL    ABS. BASOPHILS 0.0 0.0 - 0.1 K/UL    ABS. IMM. GRANS. 0.1 (H) 0.00 - 0.04 K/UL    DF AUTOMATED      RBC COMMENTS NORMOCYTIC, NORMOCHROMIC     HEPATIC FUNCTION PANEL    Collection Time: 07/01/19  8:16 AM   Result Value Ref Range    Protein, total 7.7 6.4 - 8.2 g/dL    Albumin 3.0 (L) 3.5 - 5.0 g/dL    Globulin 4.7 (H) 2.0 - 4.0 g/dL    A-G Ratio 0.6 (L) 1.1 - 2.2      Bilirubin, total 0.3 0.2 - 1.0 MG/DL    Bilirubin, direct 0.1 0.0 - 0.2 MG/DL    Alk.  phosphatase 64 45 - 117 U/L    AST (SGOT) 19 15 - 37 U/L    ALT (SGPT) 19 12 - 78 U/L   TSH 3RD GENERATION    Collection Time: 07/01/19  8:16 AM   Result Value Ref Range    TSH 8.85 (H) 0.36 - 3.74 uIU/mL   RENAL FUNCTION PANEL    Collection Time: 07/01/19  8:16 AM   Result Value Ref Range    Sodium 134 (L) 136 - 145 mmol/L    Potassium 3.9 3.5 - 5.1 mmol/L    Chloride 103 97 - 108 mmol/L    CO2 22 21 - 32 mmol/L    Anion gap 9 5 - 15 mmol/L    Glucose 170 (H) 65 - 100 mg/dL    BUN 11 6 - 20 MG/DL    Creatinine 0.74 0.70 - 1.30 MG/DL    BUN/Creatinine ratio 15 12 - 20      GFR est AA >60 >60 ml/min/1.73m2    GFR est non-AA >60 >60 ml/min/1.73m2    Calcium 8.0 (L) 8.5 - 10.1 MG/DL    Phosphorus 2.1 (L) 2.6 - 4.7 MG/DL    Albumin 3.1 (L) 3.5 - 5.0 g/dL   POC CHEM8    Collection Time: 07/01/19  8:19 AM   Result Value Ref Range    Calcium, ionized (POC) 1.11 (L) 1.12 - 1.32 mmol/L    Sodium (POC) 133 (L) 136 - 145 mmol/L    Potassium (POC) 3.8 3.5 - 5.1 mmol/L    Chloride (POC) 99 98 - 107 mmol/L    CO2 (POC) 20 (L) 21 - 32 mmol/L    Anion gap (POC) 18 10 - 20 mmol/L    Glucose (POC) 174 (H) 65 - 100 mg/dL    BUN (POC) 9 9 - 20 mg/dL    Creatinine (POC) 0.6 0.6 - 1.3 mg/dL    GFRAA, POC >60 >60 ml/min/1.73m2    GFRNA, POC >60 >60 ml/min/1.73m2    Hematocrit (POC) 39 36.6 - 50.3 %    Comment Notified RN or MD immediately by        Corrected Calcium 8.8  Creatinine Clearance 74    The following medications administered:  [email or web address]  Zometa 4mg IV over 15 minutes  Keytruda HELD, scans scheduled for 7/2/19    Pt tolerated treatment well. Port flushed per policy and de-accessed, 2x2 and tape placed.  Pt discharged ambulatory in no acute distress at 1015, accompanied by friend. Next appointment 7/22/19 at 0800.

## 2019-07-02 NOTE — ROUTINE PROCESS
1720  Thoracentesis complete, drained 1500 ml of dark red fluid. Patient tolerated procedure without difficulty.

## 2019-07-02 NOTE — ED PROVIDER NOTES
EMERGENCY DEPARTMENT HISTORY AND PHYSICAL EXAM 
 
 
Date: 7/2/2019 Patient Name: Abdulaziz Patten Patient Age and Sex: 75 y.o. male History of Presenting Illness Chief Complaint Patient presents with  Referral / Consult Pt sent from outpatient CT for large right pleural effusion. Dr Mi Feeling to do thoracentesis at bedside History Provided By: Patient HPI: Abdulaziz Patten is a 53-year-old male with a history of ray nods, lung cancer with metastasis to the bone, presenting for shortness of breath after being sent from CT. Patient states that for the past 5 days he has been having worsening shortness of breath. Denies any cough, leg swelling, chest pain, nausea or vomiting associated with it. States that he went to see Dr. Jorje Moody his oncologist who referred him for a CTA of the chest today. Radiology called over to the ER and made us aware that patient has a large right-sided pleural effusion. No pulmonary embolism. Patient states that right now he is feeling okay. Denies using any oxygen at home or needing oxygen. There are no other complaints, changes, or physical findings at this time. PCP: Margaretha Severs, MD 
 
Current Facility-Administered Medications on File Prior to Encounter Medication Dose Route Frequency Provider Last Rate Last Dose  [COMPLETED] sodium chloride (NS) flush 10 mL  10 mL IntraVENous JOSE Mercado MD   10 mL at 07/02/19 1628  [COMPLETED] iopamidol (ISOVUE-370) 76 % injection 100 mL  100 mL IntraVENous JOSE Mercado MD   80 mL at 07/02/19 1627 Current Outpatient Medications on File Prior to Encounter Medication Sig Dispense Refill  methylPREDNISolone (MEDROL DOSEPACK) 4 mg tablet Taper as directed 1 Dose Pack 0  
 pantoprazole (PROTONIX) 40 mg tablet Take 1 Tab by mouth daily. 60 Tab 3  
 lidocaine-prilocaine (EMLA) topical cream Apply  to affected area as needed for Pain.  30 g 0  
  ondansetron (ZOFRAN ODT) 4 mg disintegrating tablet Take 1 Tab by mouth every eight (8) hours as needed for Nausea. 40 Tab 1  
 folic acid (FOLVITE) 1 mg tablet Take 1 Tab by mouth daily. 30 Tab 6  ALPRAZolam (XANAX) 1 mg tablet TAKE ONE-HALF TO ONE TABLET BY MOUTH EVERY 12 HOURS AS NEEDED 60 Tab 3  
 levothyroxine (SYNTHROID) 50 mcg tablet Take 1 Tab by mouth Daily (before breakfast). For thyroid 30 Tab 12  
 pravastatin (PRAVACHOL) 40 mg tablet TAKE ONE TABLET BY MOUTH ONCE DAILY FOR CHOLESTEROL 90 Tab 3  
 amLODIPine (NORVASC) 5 mg tablet TAKE ONE TABLET BY MOUTH ONCE DAILY FOR BLOOD PRESSURE 90 Tab 3 Past History Past Medical History: 
Past Medical History:  
Diagnosis Date  Acquired hypothyroidism 3/5/2018  
 HTN (hypertension)  Hypercholesterolemia  Hyperlipidemia  Laryngeal squamous cell carcinoma ([facility] [address] 2016  Subclinical hypothyroidism Past Surgical History: 
Past Surgical History:  
Procedure Laterality Date  HX ORTHOPAEDIC Back surgery x3  IR INSERT TUNL CVC W PORT OVER 5 YEARS  3/14/2019 Family History: 
Family History Problem Relation Age of Onset  Dementia Mother  Alcohol abuse Father Social History: 
Social History Tobacco Use  Smoking status: Former Smoker  Smokeless tobacco: Never Used Substance Use Topics  Alcohol use: No  
 Drug use: No  
 
 
Allergies: Allergies Allergen Reactions  Morphine Rash  Lisinopril Other (comments)  
  dizziness  Propranolol Other (comments)  
  fatigue Review of Systems Review of Systems Constitutional: Negative for chills and fever. Respiratory: Positive for shortness of breath. Negative for cough. Cardiovascular: Negative for chest pain. Gastrointestinal: Negative for constipation, diarrhea, nausea and vomiting. Neurological: Negative for weakness and numbness. All other systems reviewed and are negative.  
  
 
Physical Exam  
Physical Exam  
 Constitutional: He is oriented to person, place, and time. He appears well-developed and well-nourished. HENT:  
Head: Normocephalic and atraumatic. Eyes: Conjunctivae and EOM are normal.  
Neck: Normal range of motion. Neck supple. Cardiovascular: Normal rate and regular rhythm. Pulmonary/Chest: Effort normal and breath sounds normal. No respiratory distress. He has no wheezes. Patient saturating at 96% on room air, has decreased breath sounds on the right side. Dr. Miguelina Lu, interventional radiologist setting up for thoracentesis at bedside Abdominal: Soft. He exhibits no distension. There is no tenderness. Musculoskeletal: Normal range of motion. Neurological: He is alert and oriented to person, place, and time. Skin: Skin is warm and dry. Psychiatric: He has a normal mood and affect. Nursing note and vitals reviewed. Diagnostic Study Results Labs - Recent Results (from the past 12 hour(s)) CELL COUNT, BODY FLUID Collection Time: 07/02/19  5:12 PM  
Result Value Ref Range BODY FLUID TYPE THORACENTESIS    
 FLUID COLOR RED    
 FLUID APPEARANCE TURBID    
 FLUID RBC CT. >100 (H) 0 /cu mm FLUID NUCLEATED CELLS 2,164 /cu mm  
 FLD NEUTROPHILS 71 (A) NRRE % FLD LYMPHS 8 (A) NRRE % FLD MONO/MACROPHAGES 7 (A) NRRE % FLD EOSINS 1 (A) NRRE % FLUID MESOTHELIAL 13 (A) NRRE % CULTURE, BODY FLUID W GRAM STAIN Collection Time: 07/02/19  5:12 PM  
Result Value Ref Range Special Requests: NO SPECIAL REQUESTS    
 GRAM STAIN NO WBC'S SEEN    
 GRAM STAIN NO ORGANISMS SEEN Culture result: PENDING   
PROTEIN TOTAL, FLUID Collection Time: 07/02/19  5:12 PM  
Result Value Ref Range Fluid Type: THORACENTESIS Protein total, body fld. 4.6 g/dL ALBUMIN, FLUID Collection Time: 07/02/19  5:12 PM  
Result Value Ref Range Fluid Type: THORACENTESIS Albumin, body fld. 2.4 g/dL PH, FLUID  Collection Time: 07/02/19  5:12 PM  
 Result Value Ref Range FLUID TYPE(15) THORACENTESIS    
 FLUID PH 7.2 GLUCOSE, FLUID Collection Time: 07/02/19  5:12 PM  
Result Value Ref Range Fluid Type: THORACENTESIS Glucose, body fld. 88 MG/DL  
LDH, BODY FLUID Collection Time: 07/02/19  5:12 PM  
Result Value Ref Range Fluid Type: THORACENTESIS    
 LD, body fld. 2,077 U/L  
SAMPLES BEING HELD Collection Time: 07/02/19  5:12 PM  
Result Value Ref Range SAMPLES BEING HELD ANAEROBIC CULTURETTE   
 COMMENT Add-on orders for these samples will be processed based on acceptable specimen integrity and analyte stability, which may vary by analyte. CBC WITH AUTOMATED DIFF Collection Time: 07/02/19  5:18 PM  
Result Value Ref Range WBC 28.5 (H) 4.1 - 11.1 K/uL  
 RBC 4.46 4.10 - 5.70 M/uL  
 HGB 13.1 12.1 - 17.0 g/dL HCT 38.8 36.6 - 50.3 % MCV 87.0 80.0 - 99.0 FL  
 MCH 29.4 26.0 - 34.0 PG  
 MCHC 33.8 30.0 - 36.5 g/dL  
 RDW 13.6 11.5 - 14.5 % PLATELET 963 611 - 270 K/uL MPV 9.3 8.9 - 12.9 FL  
 NRBC 0.0 0  WBC ABSOLUTE NRBC 0.00 0.00 - 0.01 K/uL NEUTROPHILS 98 (H) 32 - 75 % LYMPHOCYTES 2 (L) 12 - 49 % MONOCYTES 0 (L) 5 - 13 % EOSINOPHILS 0 0 - 7 % BASOPHILS 0 0 - 1 % IMMATURE GRANULOCYTES 0 0.0 - 0.5 % ABS. NEUTROPHILS 27.9 (H) 1.8 - 8.0 K/UL  
 ABS. LYMPHOCYTES 0.6 (L) 0.8 - 3.5 K/UL  
 ABS. MONOCYTES 0.0 0.0 - 1.0 K/UL  
 ABS. EOSINOPHILS 0.0 0.0 - 0.4 K/UL  
 ABS. BASOPHILS 0.0 0.0 - 0.1 K/UL  
 ABS. IMM. GRANS. 0.0 0.00 - 0.04 K/UL  
 DF SMEAR SCANNED    
 RBC COMMENTS NORMOCYTIC, NORMOCHROMIC METABOLIC PANEL, COMPREHENSIVE Collection Time: 07/02/19  5:18 PM  
Result Value Ref Range Sodium 130 (L) 136 - 145 mmol/L Potassium 4.2 3.5 - 5.1 mmol/L Chloride 98 97 - 108 mmol/L  
 CO2 23 21 - 32 mmol/L Anion gap 9 5 - 15 mmol/L Glucose 125 (H) 65 - 100 mg/dL BUN 16 6 - 20 MG/DL  Creatinine 0.75 0.70 - 1.30 MG/DL  
 BUN/Creatinine ratio 21 (H) 12 - 20    
 GFR est AA >60 >60 ml/min/1.73m2 GFR est non-AA >60 >60 ml/min/1.73m2 Calcium 9.3 8.5 - 10.1 MG/DL Bilirubin, total 0.3 0.2 - 1.0 MG/DL  
 ALT (SGPT) 37 12 - 78 U/L  
 AST (SGOT) 41 (H) 15 - 37 U/L Alk. phosphatase 78 45 - 117 U/L Protein, total 8.5 (H) 6.4 - 8.2 g/dL Albumin 3.5 3.5 - 5.0 g/dL Globulin 5.0 (H) 2.0 - 4.0 g/dL A-G Ratio 0.7 (L) 1.1 - 2.2    
TROPONIN I Collection Time: 07/02/19  5:18 PM  
Result Value Ref Range Troponin-I, Qt. <0.05 <0.05 ng/mL NT-PRO BNP Collection Time: 07/02/19  5:18 PM  
Result Value Ref Range NT pro- <450 PG/ML Radiologic Studies -  
XR CHEST PORT Final Result IMPRESSION:   
  
No pneumothorax following right thoracentesis. [facility] Final Result IMPRESSION: Successful ultrasound guided right thoracentesis yielding  
approximately 1500 ml of bloody fluid. CT Results  (Last 48 hours) 07/02/19 1627  CTA [facility] CONT Final result Impression:  IMPRESSION:   
1. Interval increase in size of the right pleural effusion seen on recent CT,  
now large. 2.  No evidence of pulmonary embolism. 3.  Right upper lobe spiculated mass measuring 24 x 17 mm, likely a primary  
pulmonary malignancy. 4.  Mediastinal lymphadenopathy. 5.  Diffuse osseous metastases. Findings discussed with Dr. Kristen Valenzuela by Dr. Asher Pretty via telephone at 4:50 PM  
   
  
 Narrative:  EXAM:  CTA CHEST W OR W WO CONT INDICATION:  Shortness of breath COMPARISON: CT chest abdomen pelvis June 20, 2019 TECHNIQUE: Helical thin section chest CT following uneventful intravenous  
administration of nonionic contrast according to departmental PE protocol. Coronal and sagittal reformats were performed. 3D/MIP post processing was  
performed. CT dose reduction was achieved through use of a standardized protocol tailored for this examination and automatic exposure control for dose  
modulation. FINDINGS: This is a good quality study for the evaluation of pulmonary embolism  
to the first subsegmental arterial level. There is no pulmonary embolism to this  
level. The thoracic aorta is normal in caliber. Cardiac size is within normal limits. No pericardial effusion. Enlarged lower paratracheal lymph nodes, similar to the  
recent CT, with the largest lymph node at the right lower paratracheal station  
measuring 13 mm. A left lower paratracheal lymph node measures 9 mm in short  
axis. There is a dominant right upper lobe paramediastinal mass measuring 24 x 17 mm,  
similar to the recent chest CT. In the interim, there is been enlargement of the  
right pleural effusion which is now large. There is considerable atelectasis in  
the right lower lobe. Diffuse emphysematous and fibrotic changes of the lungs  
are again noted with small scattered nodules throughout the left lung. Central airways are unremarkable. Limited images of the upper abdomen are within normal limits. Multiple sclerotic  
facet disease throughout the spine and sternum as well as multiple ribs,  
indicative of metastatic disease. CXR Results  (Last 48 hours) 07/02/19 1732  XR CHEST PORT Final result Impression:  IMPRESSION:   
   
No pneumothorax following right thoracentesis. Narrative:  EXAM:  XR CHEST PORT INDICATION:  post thoracentesis COMPARISON:  CT of 7/2/2019 FINDINGS:  
   
A portable AP radiograph of the chest was obtained at 17:16 hours. The tip of  
the infusion catheter is in the region of the atriocaval junction. There has  
been a decrease in the right pleural effusion following thoracentesis. There is  
no evidence of pneumothorax. Medical Decision Making I am the first provider for this patient. I reviewed the vital signs, available nursing notes, past medical history, past surgical history, family history and social history. Vital Signs-Reviewed the patient's vital signs. Patient Vitals for the past 12 hrs: 
 Temp Pulse Resp BP SpO2  
07/02/19 1805  87 18 128/78 91 % 07/02/19 1718    131/78   
07/02/19 1715    146/70   
07/02/19 1710    142/67   
07/02/19 1709 97.9 °F (36.6 °C) 89 21 148/84 92 % 07/02/19 1708  82 23 148/84 94 % Records Reviewed: Nursing Notes and Old Medical Records Provider Notes (Medical Decision Making):  
Patient presenting for worsening shortness of breath over the past 5 days and confirmed to have a large right-sided pleural effusion. Most likely malignant pleural effusion rather than infectious in nature. IR already at bedside and ready to do thoracentesis per my request.  Will send lab work including cytology and ambulate patient after thoracentesis is done to make sure he does not desaturate. ED Course:  
Initial assessment performed. The patients presenting problems have been discussed, and they are in agreement with the care plan formulated and outlined with them. I have encouraged them to ask questions as they arise throughout their visit. ED Course as of Jul 02 2307 Tue Jul 02, 2019  
1715 Dr Madhavi Oates at bedside and therese garcia from [facility], 500ml out already. Vitals stable  
 [JS] 1821 Patient's lab work is all unremarkable except for white count of 28,000. Patient is afebrile with no tachycardia or cough. However given the high white count, will treat with antibiotics. Patient was able to ambulate without desaturation. [JS] ED Course User Index [JS] Jermaine Oscar MD  
 
Critical Care Time: CRITICAL CARE NOTE : 
 
11:08 PM 
 
IMPENDING DETERIORATION -Airway and Respiratory ASSOCIATED RISK FACTORS - Hypotension and Hypoxia MANAGEMENT- Bedside Assessment and Supervision of Care INTERPRETATION -  CT Scan, Blood Pressure and Cardiac Output Measures INTERVENTIONS - vent mngmt CASE REVIEW - Medical Sub-Specialist, Nursing and Family TREATMENT RESPONSE -Stable PERFORMED BY - Self NOTES   : 
 
I have spent 35 minutes of critical care time involved in lab review, consultations with specialist, family decision- making, bedside attention and documentation. During this entire length of time I was immediately available to the patient . Disposition: 
Discharge Note: The patient has been re-evaluated and is ready for discharge. Reviewed available results with patient. Counseled patient on diagnosis and care plan. Patient has expressed understanding, and all questions have been answered. Patient agrees with plan and agrees to follow up as recommended, or to return to the ED if their symptoms worsen. Discharge instructions have been provided and explained to the patient, along with reasons to return to the ED. PLAN: 
Discharge Medication List as of 7/2/2019  6:32 PM  
  
 
2. Follow-up Information Follow up With Specialties Details Why Contact Info Diane Neff MD Hematology and Oncology, Internal Medicine, Hematology, Oncology   [address] [place] [address] 
[phone] 3. Return to ED if worse Diagnosis Clinical Impression: 1. Pleural effusion on right 2. Laryngeal squamous cell carcinoma ([facility] [address] Attestations: 
 
Reva Braun M.D. Please note that this dictation was completed with [employer], the computer voice recognition software. Quite often unanticipated grammatical, syntax, homophones, and other interpretive errors are inadvertently transcribed by the computer software. Please disregard these errors. Please excuse any errors that have escaped final proofreading. Thank you.

## 2019-07-02 NOTE — ROUTINE PROCESS
Patient alert and oriented x 4. Dr. Madhu Ross at bedside explaining procedure to patient and providing risks and benefits. Patient verbalized understanding and signed consent for procedure.

## 2019-07-02 NOTE — ED NOTES
Pt discharged by Dr. Nic Ponce. Pt provided with discharge instructions Rx and instructions on follow up care. Pt out of ED in stable condition accompanied by family.

## 2019-07-02 NOTE — ED NOTES
Patient ambulated with pulsox. O2 sats maintained between 92-94%. Patient denies dyspnea with exertion, dizziness or pain at this time

## 2019-07-18 PROBLEM — F41.9 ANXIETY DISORDER: Status: ACTIVE | Noted: 2019-01-01

## 2019-07-18 PROBLEM — C34.91 LUNG CANCER, PRIMARY, WITH METASTASIS FROM LUNG TO OTHER SITE, RIGHT (HCC): Status: ACTIVE | Noted: 2019-01-01

## 2019-07-18 PROBLEM — K21.9 GERD (GASTROESOPHAGEAL REFLUX DISEASE): Status: ACTIVE | Noted: 2019-01-01

## 2019-07-18 PROBLEM — J96.01 ACUTE RESPIRATORY FAILURE WITH HYPOXIA (HCC): Status: ACTIVE | Noted: 2019-01-01

## 2019-07-18 NOTE — ED PROVIDER NOTES
EMERGENCY DEPARTMENT HISTORY AND PHYSICAL EXAM           Date: 7/18/2019  Patient Name: Boom Haro    History of Presenting Illness     Chief Complaint   Patient presents with    Shortness of Breath     pt reports shortness of breath x3 weeks since having fluid drained from lungs, oncology sent to ED for evaluation       History Provided By:  Patient    HPI: Boom Haro is a 75 y.o. male, with significant pmhx of adenocarcinoma, throat cancer, pleural effusion with recent drainage, who presents ambulatory to the ED with c/o SOB. Patient reports having shortness of breath that started approximately 4 weeks ago when his wife got a viral upper respiratory infection and he feels that his symptoms have stemmed from that. Patient also reports having history of COPD and cancer with recent pleural effusion requiring drainage. Patient does not wear supplemental oxygen at home and noting to be 85% on room air during evaluation. Patient reports having exacerbation of symptoms with increased humidity and recent hot weather. Also noted exacerbation of difficulty breathing with ambulation. Patient is followed by Dr. Luis Cortez for his cancer and is currently in treatment with  Immunotherapy. Patient specifically denies any associated fevers, chills, nausea, vomiting, diarrhea, abd pain, CP, urinary sxs, changes in BM, or headache. PCP: Matt Muller MD    Social Hx: denies tobacco  denies EtOH , denies Illicit Drugs    There are no other complaints, changes, or physical findings at this time.      Allergies   Allergen Reactions    Morphine Rash    Lisinopril Other (comments)     dizziness    Propranolol Other (comments)     fatigue         Current Facility-Administered Medications   Medication Dose Route Frequency Provider Last Rate Last Dose    acetaminophen (TYLENOL) tablet 650 mg  650 mg Oral Q6H PRN Marsha Piper MD        fentaNYL citrate (PF) injection 50 mcg  50 mcg IntraVENous Q4H PRN Dana Adrianna Wayne MD        ondansetron [facility]) injection 4 mg  4 mg IntraVENous Q4H PRN Marsha Piper MD        sodium chloride (NS) flush 5-40 mL  5-40 mL IntraVENous Q8H Gabriel Dugan MD        sodium chloride (NS) flush 5-40 mL  5-40 mL IntraVENous PRN Anamaria Guardado MD        acetaminophen (TYLENOL) tablet 650 mg  650 mg Oral Q6H PRN Gabriel Dugan MD        ondansetron (ZOFRAN) injection 4 mg  4 mg IntraVENous Q4H PRN Anamaria Guardado MD        nitroglycerin (NITROBID) 2 % ointment 1 Inch  1 Inch Topical Q6H PRN Gabriel Dugan MD        albuterol-ipratropium (DUO-NEB) 2.5 MG-0.5 MG/3 ML  3 mL Nebulization Q6H RT Gabriel Dugan MD         Current Outpatient Medications   Medication Sig Dispense Refill    pantoprazole (PROTONIX) 40 mg tablet Take 1 Tab by mouth daily. 60 Tab 3    lidocaine-prilocaine (EMLA) topical cream Apply  to affected area as needed for Pain. 30 g 0    ondansetron (ZOFRAN ODT) 4 mg disintegrating tablet Take 1 Tab by mouth every eight (8) hours as needed for Nausea. 40 Tab 1    folic acid (FOLVITE) 1 mg tablet Take 1 Tab by mouth daily. 30 Tab 6    ALPRAZolam (XANAX) 1 mg tablet TAKE ONE-HALF TO ONE TABLET BY MOUTH EVERY 12 HOURS AS NEEDED 60 Tab 3    levothyroxine (SYNTHROID) 50 mcg tablet Take 1 Tab by mouth Daily (before breakfast).  For thyroid 30 Tab 12    pravastatin (PRAVACHOL) 40 mg tablet TAKE ONE TABLET BY MOUTH ONCE DAILY FOR CHOLESTEROL 90 Tab 3    amLODIPine (NORVASC) 5 mg tablet TAKE ONE TABLET BY MOUTH ONCE DAILY FOR BLOOD PRESSURE 90 Tab 3       Past History     Past Medical History:  Past Medical History:   Diagnosis Date    Acquired hypothyroidism 3/5/2018    HTN (hypertension)     Hypercholesterolemia     Hyperlipidemia     Laryngeal squamous cell carcinoma ([facility] [address] 2016    Subclinical hypothyroidism        Past Surgical History:  Past Surgical History:   Procedure Laterality Date    HX ORTHOPAEDIC      Back surgery x3    IR INSERT TUNL CVC W PORT OVER 5 YEARS 3/14/2019       Family History:  Family History   Problem Relation Age of Onset    Dementia Mother     Alcohol abuse Father        Social History:  Social History     Tobacco Use    Smoking status: Former Smoker    Smokeless tobacco: Never Used   Substance Use Topics    Alcohol use: No    Drug use: No       Allergies: Allergies   Allergen Reactions    Morphine Rash    Lisinopril Other (comments)     dizziness    Propranolol Other (comments)     fatigue         Review of Systems   Review of Systems   Constitutional: Negative for chills and fever. HENT: Negative. Eyes: Negative. Respiratory: Positive for cough and shortness of breath. Negative for chest tightness. Cardiovascular: Negative for chest pain and leg swelling. Gastrointestinal: Negative for abdominal pain, diarrhea, nausea and vomiting. Endocrine: Negative. Genitourinary: Negative for difficulty urinating and dysuria. Musculoskeletal: Negative for myalgias. Skin: Negative. Neurological: Negative. Psychiatric/Behavioral: Negative. All other systems reviewed and are negative. Physical Exam   Physical Exam   Constitutional: He is oriented to person, place, and time. He appears well-developed and well-nourished. No distress. HENT:   Head: Normocephalic and atraumatic. Nose: Nose normal.   Mouth/Throat: No oropharyngeal exudate. Eyes: Pupils are equal, round, and reactive to light. Conjunctivae and EOM are normal.   Neck: Normal range of motion. Neck supple. No JVD present. Cardiovascular: Normal rate, regular rhythm, normal heart sounds and intact distal pulses. Exam reveals no friction rub. No murmur heard. Pulmonary/Chest: Effort normal and breath sounds normal. No stridor. No respiratory distress. He has no wheezes. He has no rales. Pt able to speak in full, unlabored sentences. Abdominal: Soft. Bowel sounds are normal. He exhibits no distension. There is no tenderness. There is no rebound. Musculoskeletal: Normal range of motion. He exhibits no tenderness. Neurological: He is alert and oriented to person, place, and time. No cranial nerve deficit. Skin: Skin is warm and dry. No rash noted. He is not diaphoretic. Psychiatric: He has a normal mood and affect. His speech is normal and behavior is normal. Judgment and thought content normal. Cognition and memory are normal.   Nursing note and vitals reviewed. Diagnostic Study Results     Labs -     Recent Results (from the past 12 hour(s))   CBC WITH AUTOMATED DIFF    Collection Time: 07/18/19  1:04 PM   Result Value Ref Range    WBC 15.1 (H) 4.1 - 11.1 K/uL    RBC 4.25 4. 10 - 5.70 M/uL    HGB 12.3 12.1 - 17.0 g/dL    HCT 36.4 (L) 36.6 - 50.3 %    MCV 85.6 80.0 - 99.0 FL    MCH 28.9 26.0 - 34.0 PG    MCHC 33.8 30.0 - 36.5 g/dL    RDW 14.1 11.5 - 14.5 %    PLATELET 054 646 - 656 K/uL    MPV 9.5 8.9 - 12.9 FL    NRBC 0.0 0  WBC    ABSOLUTE NRBC 0.00 0.00 - 0.01 K/uL    NEUTROPHILS 88 (H) 32 - 75 %    LYMPHOCYTES 3 (L) 12 - 49 %    MONOCYTES 6 5 - 13 %    EOSINOPHILS 3 0 - 7 %    BASOPHILS 0 0 - 1 %    IMMATURE GRANULOCYTES 0 0.0 - 0.5 %    ABS. NEUTROPHILS 13.2 (H) 1.8 - 8.0 K/UL    ABS. LYMPHOCYTES 0.5 (L) 0.8 - 3.5 K/UL    ABS. MONOCYTES 0.9 0.0 - 1.0 K/UL    ABS. EOSINOPHILS 0.5 (H) 0.0 - 0.4 K/UL    ABS. BASOPHILS 0.0 0.0 - 0.1 K/UL    ABS. IMM.  GRANS. 0.0 0.00 - 0.04 K/UL    DF AUTOMATED      RBC COMMENTS NORMOCYTIC, NORMOCHROMIC     METABOLIC PANEL, COMPREHENSIVE    Collection Time: 07/18/19  1:04 PM   Result Value Ref Range    Sodium 132 (L) 136 - 145 mmol/L    Potassium 3.6 3.5 - 5.1 mmol/L    Chloride 101 97 - 108 mmol/L    CO2 23 21 - 32 mmol/L    Anion gap 8 5 - 15 mmol/L    Glucose 119 (H) 65 - 100 mg/dL    BUN 14 6 - 20 MG/DL    Creatinine 0.64 (L) 0.70 - 1.30 MG/DL    BUN/Creatinine ratio 22 (H) 12 - 20      GFR est AA >60 >60 ml/min/1.73m2    GFR est non-AA >60 >60 ml/min/1.73m2    Calcium 8.3 (L) 8.5 - 10.1 MG/DL Bilirubin, total 0.4 0.2 - 1.0 MG/DL    ALT (SGPT) 16 12 - 78 U/L    AST (SGOT) 18 15 - 37 U/L    Alk. phosphatase 76 45 - 117 U/L    Protein, total 7.0 6.4 - 8.2 g/dL    Albumin 2.9 (L) 3.5 - 5.0 g/dL    Globulin 4.1 (H) 2.0 - 4.0 g/dL    A-G Ratio 0.7 (L) 1.1 - 2.2     CK W/ REFLX CKMB    Collection Time: 07/18/19  1:04 PM   Result Value Ref Range    CK 39 39 - 308 U/L   TROPONIN I    Collection Time: 07/18/19  1:04 PM   Result Value Ref Range    Troponin-I, Qt. <0.05 <0.05 ng/mL       Radiologic Studies -   XR CHEST PA LAT   Final Result   IMPRESSION:   Increased, now moderate to large right pleural effusion. IR INSERT CATH PLEURAL INDWELL    (Results Pending)     CT Results  (Last 48 hours)    None        CXR Results  (Last 48 hours)               07/18/19 1457  XR CHEST PA LAT Final result    Impression:  IMPRESSION:   Increased, now moderate to large right pleural effusion. Narrative:  INDICATION:   sob       COMPARISON: July 2, 2019       FINDINGS:       Frontal and lateral views of the chest demonstrate a right internal jugular   Port-A-Cath. Heart size is normal. The lungs are hyperinflated. Increased now   moderate to large right pleural effusion with associated atelectasis. No   left-sided pleural effusion. No pneumothorax. The osseous structures are   unremarkable. Medical Decision Making   I am the first provider for this patient. I reviewed the vital signs, available nursing notes, past medical history, past surgical history, family history and social history. Vital Signs-Reviewed the patient's vital signs.   Patient Vitals for the past 12 hrs:   Temp Pulse Resp BP SpO2   07/18/19 1212 98.5 °F (36.9 °C) (!) 103 18 142/73 90 %       Pulse Oximetry Analysis - 85% on RA    Cardiac Monitor:   Rate: 79 bpm  Rhythm: NSR    Records Reviewed: Nursing Notes, Old Medical Records, Previous electrocardiograms, Previous Radiology Studies and Previous Laboratory Studies    Provider Notes (Medical Decision Making):     DDX:  Pneumonia, pleural effusion, COPD exacerbation, arrhythmia    Plan:  Labs, cxr, ekg, supplemental oxygen    Impression:  Shortness of breath, hypoxia, pleural effusion    ED Course:   Initial assessment performed. The patients presenting problems have been discussed, and they are in agreement with the care plan formulated and outlined with them. I have encouraged them to ask questions as they arise throughout their visit. I reviewed our electronic medical record system for any past medical records that were available that may contribute to the patients current condition, the nursing notes and and vital signs from today's visit    Nursing notes will be reviewed as they become available in realtime while the pt has been in the ED. Que Campos MD    EKG interpretation 1227: NSR, nl Axis, rate 91; , QRS 86, QTc 440; no acute ischemia; Que Campos MD    I personally reviewed pt's imaging. Official read by radiology noted above. Que Campos MD    PROGRESS NOTE:  4822  Pt noted to have reaccumulation of previously noted pleural effusion. Discussed with hospitalist for admission due to patient's hypoxia. Of note, having care came by to see patient and discuss goals of therapy although unsure of plan with Dr. Nic Brito at this time. Review also is leading eval she is 30 years  Que Campos MD         CONSULT NOTE:   3:50 PM  Que Campos MD spoke with Dr. Judith Parra,   Specialty: Remonia Rubinstein Dr. Rochel Jung due to hypoxia and recurrence of pleural effusion. Discussed pt's HPI and available diagnostic results thus far. Expressed concerns for needed admission. Consultant will evaluate for admission. Que Campos MD      ADMISSION NOTE:  3:50 PM  Patient is being admitted to the hospital by Dr. Judith Parra. The results of their tests and reasons for their admission have been discussed with them and/or available family.   They convey agreement and understanding for the need to be admitted and for their admission diagnosis. Zachary Ontiveros MD      Critical Care Time:     none      Diagnosis     Clinical Impression:   1. Hypoxia    2. Pleural effusion on right        PLAN:  1. Admit to hospitalist        Please note that this dictation was completed with [employer], the computer voice recognition software. Quite often unanticipated grammatical, syntax, homophones, and other interpretive errors are inadvertently transcribed by the computer software. Please disregard these errors. Please excuse any errors that have escaped final proofreading    This note will not be viewable in [employer] [facility].

## 2019-07-18 NOTE — ACP (ADVANCE CARE PLANNING)
Advance Care Planning Note      NAME: Storm Caldwell   :  1945   MRN:  [id number]     Date/Time:  2019 4:11 PM    Active Diagnoses:  Acute respiratory failure  Recurrent pleural effusion  Metastatic lung cancer  Laryngeal cancer    These active diagnoses are of sufficient risk that focused discussion on advance care planning is indicated in order to allow the patient to thoughtfully consider personal goals of care, and if situations arise that prevent the ability to personally give input, to ensure appropriate representation of their personal desires for different levels and aggressiveness of care. Discussion:   Discussion with patient about his pathology from his thoracocentesis, discussed poor prognosis with metastatic cancer. Code status addressed and wants to be a DNR / DNI. Patient wants central line and vasopressors if needed. Patient  would like to assign wife Miriam Irene as the surrogate decision maker. Persons present and participating in discussion: Yrn Durham MD      Time Spent:   Total time spent face-to-face in education and discussion: 17 minutes.          Swetha Adkins MD   Hospitalist

## 2019-07-18 NOTE — PROGRESS NOTES
Spiritual Care Assessment/Progress Note  [facility]      NAME: Flor Landa      MRN: [id number]  AGE: 75 y.o. SEX: male  Holiness Affiliation: Mandaeism   Language: English     7/18/2019     Total Time (in minutes): 13     Spiritual Assessment begun in [facility] EMERGENCY DEPT through conversation with:         [x]Patient        [x] Family    [] Friend(s)        Reason for Consult: Palliative Care, Initial/Spiritual Assessment     Spiritual beliefs: (Please include comment if needed)     [] Identifies with a tabatha tradition:         [x] Supported by a tabatha community:            [] Claims no spiritual orientation:           [] Seeking spiritual identity:                [x] Adheres to an individual form of spirituality:           [] Not able to assess:                           Identified resources for coping:      [x] Prayer                               [] Music                  [] Guided Imagery     [x] Family/friends                 [] Pet visits     [] Devotional reading                         [] Unknown     [] Other:                                              Interventions offered during this visit: (See comments for more details)    Patient Interventions: Affirmation of tabatha, Affirmation of emotions/emotional suffering, Iconic (affirming the presence of God/Higher Power), Initial/Spiritual assessment, patient floor, Normalization of emotional/spiritual concerns, Holiness beliefs/image of God discussed, Prayer (assurance of)     Family/Friend(s):  Affirmation of tabatha, Iconic (affirming the presence of God/Higher Power), Prayer (assurance of)     Plan of Care:     [x] Support spiritual and/or cultural needs    [] Support AMD and/or advance care planning process      [] Support grieving process   [] Coordinate Rites and/or Rituals    [] Coordination with community clergy   [] No spiritual needs identified at this time   [] Detailed Plan of Care below (See Comments)  [] Make referral to Music Therapy  [] Make referral to Pet Therapy     [] Make referral to Addiction services  [] Make referral to [facility]  [] Make referral to Spiritual Care Partner  [] No future visits requested        [x] Follow up visits as needed     Comments:   Initial visit in ER for spiritual assessment of new palliative consult patient. Patient's stepdaughter returned to room during visit. Patient spoke vaguely about his health issues, but was receptive to visit. He has very good support from his wife and stepdaughter. Mr. Elizabeth Meeks shared that he went to a [employer] as a boy, but not at this time. His wife is active at [facility] in [facility]. Stepdaughter shared that, she too, is [place]. She noted that her mother's [employer] family has been very supportive. No spiritual needs or concerns were voiced at this time. Provided pastoral presence and assurance of prayer. Advised of  availability.     KIAN Mena, [facility], [address]    [address] Paging Service  [phone])

## 2019-07-18 NOTE — PROGRESS NOTES
Per notes in patient's chart there is a consult for palliative care. Patient spoke with palliative, and will follow up with oncology regarding ongoing chemo treatment vs comfort care measures. MHT did not complete med hx at this time.     Jacqueline Coleman CPhT  Medication History Pharmacy Technician

## 2019-07-18 NOTE — H&P
Hospitalist Admission Note    NAME: Latosha Gomez   :  1945   MRN:  [id number]     Date/Time:  2019 3:52 PM    Patient PCP: Nesha Bynum MD  ______________________________________________________________________  Given the patient's current clinical presentation, I have a high level of concern for decompensation if discharged from the emergency department. Complex decision making was performed, which includes reviewing the patient's available past medical records, laboratory results, and x-ray films. My assessment of this patient's clinical condition and my plan of care is as follows. Assessment / Plan:  Addendum:  Pt decided DNR/DNI asfter detailed discussion about condition and prognosis, see my ACP note. Acute respiratory failure with hypoxia (per ED report O2 sats were in 80s when started on O2)  Due to malignant recurrent large pleural effusion on right side in settings of metastatic cancer (adenocarcinoma per recent patch from fluid)  Admit  Will order pleurX as per discussion with oncology Dr Miguel A Mensah  O2 support, try to wean off after pleurX placement  Hematology consult (already discussed the case)  Palliative care consult as prognosis is poor as didn't tolerate chemo as per discussion with hematology    HTN (hypertension)  Continue Norvasc  PRN Nitropaste    Hyperlipidemia   Continue statins    Laryngeal squamous cell carcinoma   Adenocarcinoma of lung  Hematology consult as above    Hypothyroidism   Continue Synthroid    GERD (gastroesophageal reflux disease)  Continue PPIs    Anxiety disorder   Continue anxiolytics    Code Status: Full   Surrogate Decision Maker: Wife    DVT Prophylaxis: SCDs for now, consider pharmacologic DVT Px after pleurX placement      Subjective:   CHIEF COMPLAINT: shortness of breath    HISTORY OF PRESENT ILLNESS:     Marilee Bartholomew is a 75 y.o.  male who presents with shortness of breath.  As per patient, he has history of laryngeal cancer and is been on chemo. He was started to have shortness of breath 1 month and which progressive got worse and he had fluid taken out of the lung 2 weeks ago. He again started to feel short winded progressive getting worse for past week and today came to ED. Pt denies any chest pain, nausea, vomiting, diarrhea, fever, chills. In ED pt noted to have large right pleural effusion. We were asked to admit for work up and evaluation of the above problems. Past Medical History:   Diagnosis Date    Acquired hypothyroidism 3/5/2018    HTN (hypertension)     Hypercholesterolemia     Hyperlipidemia     Laryngeal squamous cell carcinoma ([facility] [address] 2016    Subclinical hypothyroidism         Past Surgical History:   Procedure Laterality Date    HX ORTHOPAEDIC      Back surgery x3    IR INSERT TUNL CVC W PORT OVER 5 YEARS  3/14/2019       Social History     Tobacco Use    Smoking status: Former Smoker    Smokeless tobacco: Never Used   Substance Use Topics    Alcohol use: No        Family History   Problem Relation Age of Onset    Dementia Mother     Alcohol abuse Father      Allergies   Allergen Reactions    Morphine Rash    Lisinopril Other (comments)     dizziness    Propranolol Other (comments)     fatigue        Prior to Admission medications    Medication Sig Start Date End Date Taking? Authorizing Provider   pantoprazole (PROTONIX) 40 mg tablet Take 1 Tab by mouth daily. 4/22/19   Bharti Myers MD   lidocaine-prilocaine (EMLA) topical cream Apply  to affected area as needed for Pain. 3/27/19   Merle Coates MD   ondansetron (ZOFRAN ODT) 4 mg disintegrating tablet Take 1 Tab by mouth every eight (8) hours as needed for Nausea. 3/27/19   Merle Coates MD   folic acid (FOLVITE) 1 mg tablet Take 1 Tab by mouth daily.  3/27/19   Merle Coates MD   ALPRAZolam Angela Levo) 1 mg tablet TAKE ONE-HALF TO ONE TABLET BY MOUTH EVERY 12 HOURS AS NEEDED 2/26/19   Nicole Ann MD levothyroxine (SYNTHROID) 50 mcg tablet Take 1 Tab by mouth Daily (before breakfast). For thyroid 2/26/19   Nesha Bynum MD   pravastatin (PRAVACHOL) 40 mg tablet TAKE ONE TABLET BY MOUTH ONCE DAILY FOR CHOLESTEROL 2/26/19   Nesha Bynum MD   amLODIPine (NORVASC) 5 mg tablet TAKE ONE TABLET BY MOUTH ONCE DAILY FOR BLOOD PRESSURE 2/5/19   Nesha Bynum MD       REVIEW OF SYSTEMS:     I am not able to complete the review of systems because:    The patient is intubated and sedated    The patient has altered mental status due to his acute medical problems    The patient has baseline aphasia from prior stroke(s)    The patient has baseline dementia and is not reliable historian    The patient is in acute medical distress and unable to provide information           Total of 12 systems reviewed as follows:       POSITIVE= underlined text  Negative = text not underlined  General:  fever, chills, sweats, generalized weakness, weight loss/gain,      loss of appetite   Eyes:    blurred vision, eye pain, loss of vision, double vision  ENT:    rhinorrhea, pharyngitis   Respiratory:   cough, sputum production, SOB, DUFFY, wheezing, pleuritic pain   Cardiology:   chest pain, palpitations, orthopnea, PND, edema, syncope   Gastrointestinal:  abdominal pain , N/V, diarrhea, dysphagia, constipation, bleeding   Genitourinary:  frequency, urgency, dysuria, hematuria, incontinence   Muskuloskeletal :  arthralgia, myalgia, back pain  Hematology:  easy bruising, nose or gum bleeding, lymphadenopathy   Dermatological: rash, ulceration, pruritis, color change / jaundice  Endocrine:   hot flashes or polydipsia   Neurological:  headache, dizziness, confusion, focal weakness, paresthesia,     Speech difficulties, memory loss, gait difficulty  Psychological: Feelings of anxiety, depression, agitation    Objective:   VITALS:    Visit Vitals  /73 (BP 1 Location: Left arm, BP Patient Position: Sitting)   Pulse (!) 103   Temp 98.5 °F (36.9 °C)   Resp 18   Ht 5' 10\" (1.778 m)   Wt 59 kg (130 lb)   SpO2 90%   BMI 18.65 kg/m²       PHYSICAL EXAM:    General:    Alert, cooperative, no distress, appears stated age. HEENT: Atraumatic, anicteric sclerae, pink conjunctivae     No oral ulcers, mucosa moist, throat clear, dentition fair  Neck:  Supple, symmetrical,  thyroid: non tender  Lungs:   Decreased breath sounds on right side. No Wheezing or Rhonchi. No rales. Chest wall:  No tenderness  No Accessory muscle use. Heart:   Regular  rhythm,  No  murmur   No edema  Abdomen:   Soft, non-tender. Not distended. Bowel sounds normal  Extremities: No cyanosis. No clubbing,      Skin turgor normal, Capillary refill normal, Radial dial pulse 2+  Skin:     Not pale. Not Jaundiced  No rashes   Psych:  Good insight. Not depressed. Not anxious or agitated. Neurologic: EOMs intact. No facial asymmetry. No aphasia or slurred speech. Symmetrical strength, Sensation grossly intact.  Alert and oriented X 4.     _______________________________________________________________________  Care Plan discussed with:    Comments   Patient y    Family      RN y    Care Manager                    Consultant:  [facility] ED physician and oncology   _______________________________________________________________________  Expected  Disposition:   Home with Family TBD   HH/PT/OT/RN    SNF/LTC    BUTCH    ________________________________________________________________________  TOTAL TIME: 61 Minutes    Critical Care Provided     Minutes non procedure based      Comments    y Reviewed previous records   >50% of visit spent in counseling and coordination of care y Discussion with patient and questions answered       ________________________________________________________________________  Signed: Violeta Rivas MD    Procedures: see electronic medical records for all procedures/Xrays and details which were not copied into this note but were reviewed prior to creation of Plan.    LAB DATA REVIEWED:    Recent Results (from the past 24 hour(s))   CBC WITH AUTOMATED DIFF    Collection Time: 07/18/19  1:04 PM   Result Value Ref Range    WBC 15.1 (H) 4.1 - 11.1 K/uL    RBC 4.25 4. 10 - 5.70 M/uL    HGB 12.3 12.1 - 17.0 g/dL    HCT 36.4 (L) 36.6 - 50.3 %    MCV 85.6 80.0 - 99.0 FL    MCH 28.9 26.0 - 34.0 PG    MCHC 33.8 30.0 - 36.5 g/dL    RDW 14.1 11.5 - 14.5 %    PLATELET 598 987 - 286 K/uL    MPV 9.5 8.9 - 12.9 FL    NRBC 0.0 0  WBC    ABSOLUTE NRBC 0.00 0.00 - 0.01 K/uL    NEUTROPHILS 88 (H) 32 - 75 %    LYMPHOCYTES 3 (L) 12 - 49 %    MONOCYTES 6 5 - 13 %    EOSINOPHILS 3 0 - 7 %    BASOPHILS 0 0 - 1 %    IMMATURE GRANULOCYTES 0 0.0 - 0.5 %    ABS. NEUTROPHILS 13.2 (H) 1.8 - 8.0 K/UL    ABS. LYMPHOCYTES 0.5 (L) 0.8 - 3.5 K/UL    ABS. MONOCYTES 0.9 0.0 - 1.0 K/UL    ABS. EOSINOPHILS 0.5 (H) 0.0 - 0.4 K/UL    ABS. BASOPHILS 0.0 0.0 - 0.1 K/UL    ABS. IMM. GRANS. 0.0 0.00 - 0.04 K/UL    DF AUTOMATED      RBC COMMENTS NORMOCYTIC, NORMOCHROMIC     METABOLIC PANEL, COMPREHENSIVE    Collection Time: 07/18/19  1:04 PM   Result Value Ref Range    Sodium 132 (L) 136 - 145 mmol/L    Potassium 3.6 3.5 - 5.1 mmol/L    Chloride 101 97 - 108 mmol/L    CO2 23 21 - 32 mmol/L    Anion gap 8 5 - 15 mmol/L    Glucose 119 (H) 65 - 100 mg/dL    BUN 14 6 - 20 MG/DL    Creatinine 0.64 (L) 0.70 - 1.30 MG/DL    BUN/Creatinine ratio 22 (H) 12 - 20      GFR est AA >60 >60 ml/min/1.73m2    GFR est non-AA >60 >60 ml/min/1.73m2    Calcium 8.3 (L) 8.5 - 10.1 MG/DL    Bilirubin, total 0.4 0.2 - 1.0 MG/DL    ALT (SGPT) 16 12 - 78 U/L    AST (SGOT) 18 15 - 37 U/L    Alk.  phosphatase 76 45 - 117 U/L    Protein, total 7.0 6.4 - 8.2 g/dL    Albumin 2.9 (L) 3.5 - 5.0 g/dL    Globulin 4.1 (H) 2.0 - 4.0 g/dL    A-G Ratio 0.7 (L) 1.1 - 2.2     CK W/ REFLX CKMB    Collection Time: 07/18/19  1:04 PM   Result Value Ref Range    CK 39 39 - 308 U/L   TROPONIN I    Collection Time: 07/18/19  1:04 PM   Result Value Ref Range Troponin-I, Qt. <0.05 <0.05 ng/mL

## 2019-07-18 NOTE — ED NOTES
TRANSFER - OUT REPORT:    Verbal report given to Erin Gonsales RN (name) on Forrest Smith  being transferred to [address] (unit) for routine progression of care       Report consisted of patients Situation, Background, Assessment and   Recommendations(SBAR). Information from the following report(s) SBAR, Kardex, ED Summary, Intake/Output, MAR, Recent Results and Med Rec Status was reviewed with the receiving nurse. Lines:   Venous Access Device smart port LOT [id number] 03/14/19 Upper chest (subclavicular area, right (Active)        Opportunity for questions and clarification was provided.       Patient transported with:   [facility]

## 2019-07-18 NOTE — PROGRESS NOTES
Palliative Medicine  [place]: [phone])  Formerly [facility]: [phone] ([phone])    Stevie Ramos is a 74 yo  man with a history of laryngeal cancer and metastatic lung cancer. He is followed by Dr Lily Puga and has been seen twice at the outpatient palliative clinic, by Dr. Jam Shankar. Inpatient palliative team asked to see patient for Goals of Care discussion regarding whether patient is interested in comfort focused care/ hospice. Alisson Palma NP and Jahaira Wright LCSW met with patient and his step daughter, Louetta Homans. Patient is a friendly, conversant, congenial man who shared at length his cancer history and treatments. He is still under the impression that he can receive treatments and he noted that he has an appointment with Dr Lily Puga on Monday next week. Step daughter Mary House is also under this same impression. Palliative team discussed with patient and Michelle that we understood that treatment is not being recommended at this time for the cancer. Michelle expressed disbelief and shock, \"no one has told us that before, this is the first I am hearing about this\". Michelle asked patient if he was aware of this and patient noted he was not. Palliative team apologized to family and indicated that they need to have a conversation with oncology team regarding their recommendations and that this needs to happen before we discuss any goals of care. Alisson Palma NP has communicated with Dr Lily Puga and his team and they will follow up with patient. Palliative team remains available as needed for support and further goals of care discussion following Dr Lashell Jean with patient.

## 2019-07-18 NOTE — TELEPHONE ENCOUNTER
Patient ans spouse called stating pt was having difficulty breathing, pt reports when he takes a deep breath he still don't feel like he's getting enough air. NP notified; NP advised nurse to advise pt to go to ER.

## 2019-07-18 NOTE — CONSULTS
[address] [facility]  at [address] [place] [address], [address] [facility]  [phone]       Oncology Inpatient Consult Note      Patient: Jenn Polanco MRN: [id number]  SSN: [id number]    YOB: 1945  Age: 75 y.o. Sex: male        Reason for consultation:     Metastatic adenocarcinoma of the lung admitted with acute respiratory failure    Subjective:      Jenn Polanco is a 75 y.o. male who we were asked to see by Dr. Kurt Galarza with metastatic lung cancer. He also has a history of squamous cell laryngeal carcinoma. He received concurrent radiation with weekly Cisplatin. Laryngoscopy by Dr. Alicia on 1/19/2017 showed no tumor. He was later diagnosed with metastatic lung cancer when CT scan showed a questionable area in the spine. Biopsy of the bone showed metastatic disease of lung primary. The tissue was insufficient for biomarker testing. He received the first cycle of palliative chemotherapy with Carboplatin, Alimta and Pembrolizumab. He was very fatigued and depressed after treatment and felt the chemotherapy was too much. Treatment was switched to Pembro monotherapy. He had been experiencing dyspnea and dry cough and CTA revealed a large right pleural effusion. He underwent thoracentesis 2 weeks ago. Pathology revealed malignancy consistent with lung primary. He has since developed progressively worsening shortness of breath and was directed to the ED. Scans show large recurrent right pleural effusion. He is being admitted for further management. Discussed with Mr. Kristen Devi the poor prognosis given recent events and that he is no longer a good candidate for any systemic treatment. He expressed understanding.        Review of Systems:     Constitutional: fatigue, depression  Eyes: negative  Ears, Nose, Mouth, Throat, and Face: negative  Respiratory: SOB and cough  Cardiovascular: negative  Gastrointestinal: negative  Genitourinary:negative  Integument/Breast: negative  Hematologic/Lymphatic: negative  Musculoskeletal:negative  Neurological: negative      Past Medical History:   Diagnosis Date    Acquired hypothyroidism 3/5/2018    HTN (hypertension)     Hypercholesterolemia     Hyperlipidemia     Laryngeal squamous cell carcinoma ([facility] [address] 2016    Subclinical hypothyroidism      Past Surgical History:   Procedure Laterality Date    HX ORTHOPAEDIC      Back surgery x3    IR INSERT TUNL CVC W PORT OVER 5 YEARS  3/14/2019      Family History   Problem Relation Age of Onset    Dementia Mother     Alcohol abuse Father      Social History     Tobacco Use    Smoking status: Former Smoker    Smokeless tobacco: Never Used   Substance Use Topics    Alcohol use: No      Prior to Admission medications    Medication Sig Start Date End Date Taking? Authorizing Provider   pantoprazole (PROTONIX) 40 mg tablet Take 1 Tab by mouth daily. 4/22/19   Adri Howell MD   lidocaine-prilocaine (EMLA) topical cream Apply  to affected area as needed for Pain. 3/27/19   Elin Schultz MD   ondansetron (ZOFRAN ODT) 4 mg disintegrating tablet Take 1 Tab by mouth every eight (8) hours as needed for Nausea. 3/27/19   Elin Schultz MD   folic acid (FOLVITE) 1 mg tablet Take 1 Tab by mouth daily. 3/27/19   Elin Schultz MD   ALPRAZolam Ahmed Cordon) 1 mg tablet TAKE ONE-HALF TO ONE TABLET BY MOUTH EVERY 12 HOURS AS NEEDED 2/26/19   Adelaida Lopez MD   levothyroxine (SYNTHROID) 50 mcg tablet Take 1 Tab by mouth Daily (before breakfast).  For thyroid 2/26/19   Adelaida Lopez MD   pravastatin (PRAVACHOL) 40 mg tablet TAKE ONE TABLET BY MOUTH ONCE DAILY FOR CHOLESTEROL 2/26/19   Adelaida Lopez MD   amLODIPine (NORVASC) 5 mg tablet TAKE ONE TABLET BY MOUTH ONCE DAILY FOR BLOOD PRESSURE 2/5/19   Adelaida Lopez MD          Allergies   Allergen Reactions    Morphine Rash    Lisinopril Other (comments)     dizziness    Propranolol Other (comments)     fatigue           Objective:     Visit Vitals  /77   Pulse 96   Temp 98.5 °F (36.9 °C)   Resp 18   Ht 5' 10\" (1.778 m)   Wt 130 lb (59 kg)   SpO2 92%   BMI 18.65 kg/m²         Physical Exam:    GENERAL: alert, cooperative  HEENT: throat dry  LYMPHATIC: Cervical, supraclavicular, and axillary nodes normal.   THROAT & NECK: dry oral mucosa  LUNG: clear to auscultation bilaterally  HEART: regular rate and rhythm  ABDOMEN: soft, non-tender  EXTREMITIES: no cyanosis or edema  SKIN: Normal.  NEUROLOGIC: negative      Lab Results   Component Value Date/Time    WBC 15.1 (H) 07/18/2019 01:04 PM    Hemoglobin (POC) 9.5 (L) 10/18/2016 10:31 AM    HGB 12.3 07/18/2019 01:04 PM    Hematocrit (POC) 39 07/01/2019 08:19 AM    HCT 36.4 (L) 07/18/2019 01:04 PM    PLATELET 470 89/77/0013 01:04 PM    MCV 85.6 07/18/2019 01:04 PM       Lab Results   Component Value Date/Time    Sodium 132 (L) 07/18/2019 01:04 PM    Potassium 3.6 07/18/2019 01:04 PM    Chloride 101 07/18/2019 01:04 PM    CO2 23 07/18/2019 01:04 PM    Anion gap 8 07/18/2019 01:04 PM    Glucose 119 (H) 07/18/2019 01:04 PM    BUN 14 07/18/2019 01:04 PM    Creatinine 0.64 (L) 07/18/2019 01:04 PM    BUN/Creatinine ratio 22 (H) 07/18/2019 01:04 PM    GFR est AA >60 07/18/2019 01:04 PM    GFR est non-AA >60 07/18/2019 01:04 PM    Calcium 8.3 (L) 07/18/2019 01:04 PM    Bilirubin, total 0.4 07/18/2019 01:04 PM    AST (SGOT) 18 07/18/2019 01:04 PM    Alk.  phosphatase 76 07/18/2019 01:04 PM    Protein, total 7.0 07/18/2019 01:04 PM    Albumin 2.9 (L) 07/18/2019 01:04 PM    Globulin 4.1 (H) 07/18/2019 01:04 PM    A-G Ratio 0.7 (L) 07/18/2019 01:04 PM    ALT (SGPT) 16 07/18/2019 01:04 PM           CT Results (most recent):  Results from Hospital Encounter encounter on 07/02/19   CTA CHEST W OR W WO CONT    Narrative EXAM:  CTA CHEST W OR W WO CONT    INDICATION:  Shortness of breath    COMPARISON: CT chest abdomen pelvis June 20, 2019    TECHNIQUE: Helical thin section chest CT following uneventful intravenous  administration of nonionic contrast according to departmental PE protocol. Coronal and sagittal reformats were performed. 3D/MIP post processing was  performed. CT dose reduction was achieved through use of a standardized protocol  tailored for this examination and automatic exposure control for dose  modulation. FINDINGS: This is a good quality study for the evaluation of pulmonary embolism  to the first subsegmental arterial level. There is no pulmonary embolism to this  level. The thoracic aorta is normal in caliber. Cardiac size is within normal limits. No pericardial effusion. Enlarged lower paratracheal lymph nodes, similar to the  recent CT, with the largest lymph node at the right lower paratracheal station  measuring 13 mm. A left lower paratracheal lymph node measures 9 mm in short  axis. There is a dominant right upper lobe paramediastinal mass measuring 24 x 17 mm,  similar to the recent chest CT. In the interim, there is been enlargement of the  right pleural effusion which is now large. There is considerable atelectasis in  the right lower lobe. Diffuse emphysematous and fibrotic changes of the lungs  are again noted with small scattered nodules throughout the left lung. Central airways are unremarkable. Limited images of the upper abdomen are within normal limits. Multiple sclerotic  facet disease throughout the spine and sternum as well as multiple ribs,  indicative of metastatic disease. Impression IMPRESSION:   1. Interval increase in size of the right pleural effusion seen on recent CT,  now large. 2.  No evidence of pulmonary embolism. 3.  Right upper lobe spiculated mass measuring 24 x 17 mm, likely a primary  pulmonary malignancy. 4.  Mediastinal lymphadenopathy. 5.  Diffuse osseous metastases.     Findings discussed with Dr. Mckeon Safe by Dr. Pasha Awad via telephone at 4:50 PM             MRI Results (most recent):  Results from [place] encounter on 02/21/19   MRI LUMB SPINE W WO CONT    Narrative EXAM: MRI LUMB SPINE W WO CONT    INDICATION: Abnormal bone scan. Malignant neoplasm of larynx, squamous cell  carcinoma C 32.9. R 94.8. Back pain. COMPARISON: Radiographs 1/23/2013, bone scan 1/21/2019    TECHNIQUE: MR imaging of the lumbar spine was performed using the following  sequences: sagittal T1, T2, STIR;  axial T1, T2 prior to and following contrast  administration. CONTRAST: 13 mL of Dotarem. FINDINGS:    Conus position, morphology, signal and enhancement are normal. Vertebral body  heights are normally maintained. There are several areas of abnormal bone signal consistent with osseous  metastatic disease. These include the anterior T12 vertebral body measuring 10  mm and 3 mm, the right anteroinferior L1 vertebral body measuring 5 mm, the mid  anteroinferior L2 vertebral body measuring 6 mm, the left posterior L3 vertebral  body measuring 12 mm and 6 mm, the posterior L5 vertebral body measuring 4 mm,  the inferior L5 vertebral body measuring 12 mm, at the visualized superior right  sacral wing measuring 9 mm, the left sacral wing measuring 6 mm, and the left  posterior iliac bone measuring 9 mm and 6 mm. There is L2-3 retrolisthesis measuring 5 mm and L3-4 retrolisthesis measuring 2  to 3 mm. There is a mild levoconvex lumbar curve centered at L3-4. No paraspinal soft tissue mass or enhancement abnormality is shown. Ankylosis of  the visualized superior right SI joint is shown with bridging osteophyte  formation. Small left SI joint osteophytes are also noted. T12-L1: Normal disc and facets. L1-L2: Mild disc space narrowing and diffuse disc bulging. Mild bilateral facet  osteoarthrosis. No canal or foraminal stenosis. L2-L3: Moderate to severe disc space narrowing with mild to moderate diffuse  disc bulging and bilateral facet osteoarthrosis.  Mild canal stenosis and right  foraminal stenosis. L3-L4: Moderate disc space narrowing and diffuse disc bulging. Mild to moderate  bilateral facet osteoarthrosis. Moderate canal stenosis. Mild to moderate right  and mild left foraminal stenosis. L4-L5: Moderate to severe disc space narrowing. Mild diffuse disc bulging. Mild  to moderate bilateral facet osteoarthrosis. Mild to moderate canal stenosis. Mild to moderate right foraminal stenosis. L5-S1: Moderate to severe disc space narrowing. Mild to moderate diffuse disc  bulging. Mild bilateral facet osteoarthrosis. No canal stenosis. Moderate left  foraminal stenosis. Impression IMPRESSION:    1. Multifocal osseous metastatic disease with lesion size measuring up to 12 mm. 2. Multilevel degenerative changes detailed by level above. Note L3-4 moderate  canal stenosis. I reviewed the images personally. Abnormal sclerotic areas in multiple vertebrae        Assessment:     1. Metastatic adenocarcinoma of the lung   Numerous bone metastasis    ECOG PS 1  Intent of Treatment - palliative  Prognosis - poor    The tissue from bone is insufficient for biomarker testing. No clear primary in the lung    Received palliative chemotherapy   Carboplatin, Alimta, Pembrolizumab - s/p 1 cycle    carboplatin, alimta discontinued after cycle 1 d/t side effects   Pembrolizumab - s/p 3 Cycles    S/p thoracentesis on 7/2/2019 - drained 1.5 L of bloody fluid - pathology revealed malignant cells consistent with primary pulmonary adenocarcinoma    Patient now with acute respiratory failure d/t malignant recurrent large right pleural effusion. No longer a candidate for systemic therapy given decline in performance status. Discussed poor prognosis with patient and transition to comfort care with hospice.       2. Squamous cell carcinoma of the larynx:    T3 N0 M0 (Stage III)    HPV status unknown    Previously Completed concurrent chemo/rads 10/2016   Cisplatin weekly X 6  In remission        Plan:       > Thoracentesis w/ aspira drain  > Transition to hospice        Signed by: Madhu Andersen NP                     July 18, 2019

## 2019-07-18 NOTE — ED NOTES
Assumed care of patient. Patient is alert and oriented, does not appear to be in distress. Patient ambulatory to ED from oncologist's office for c/o sob and coughing blood.

## 2019-07-18 NOTE — PROGRESS NOTES
TRANSFER - IN REPORT:    Verbal report received from Thomas Ramirez RN on Radhames Bach  being received from ED(unit) for routine progression of care      Report consisted of patients Situation, Background, Assessment and   Recommendations(SBAR). Information from the following report(s) SBAR was reviewed with the receiving nurse. Opportunity for questions and clarification was provided. Assessment completed upon patients arrival to unit and care assumed.

## 2019-07-19 NOTE — PROGRESS NOTES
ADULT PROTOCOL: JET AEROSOL ASSESSMENT    Patient  Alex Schultz     75 y.o.   male     7/18/2019  10:28 PM    Breath Sounds Pre Procedure: Right Breath Sounds: Diminished                               Left Breath Sounds: Diminished    Breath Sounds Post Procedure: Right Breath Sounds: Diminished                                 Left Breath Sounds: Diminished    Breathing pattern: Pre procedure Breathing Pattern: Regular          Post procedure Breathing Pattern: Regular    Heart Rate: Pre procedure Pulse: 99           Post procedure Pulse: 95    Resp Rate: Pre procedure Respirations: 18           Post procedure Respirations: 18    Peak Flow: Pre bronchodilator             Post bronchodilator       FVC/FEV1:  n/a    Incentive Spirometry:             Cough: Pre procedure                 Post procedure      Suctioned: NO    Sputum: Pre procedure                   Post procedure      Oxygen: O2 Device: Nasal cannula   Flow rate (L/min) 4LPM     Changed: NO    SpO2: Pre procedure SpO2: (!) 89 %   with oxygen              Post procedure SpO2: (!) 89 %  with oxygen    Nebulizer Therapy: Current medications Aerosolized Medications: DuoNeb      Changed: NO    Smoking History: n/a    Problem List:   Patient Active Problem List   Diagnosis Code    HTN (hypertension) I10    Hyperlipidemia E78.5    Laryngeal squamous cell carcinoma (HCC) C32.9    Subclinical hypothyroidism E03.9    Acquired hypothyroidism E03.9    Acute respiratory failure with hypoxia (HCC) J96.01    GERD (gastroesophageal reflux disease) K21.9    Anxiety disorder F41.9       Respiratory Therapist: Colton Charles RT

## 2019-07-19 NOTE — PROGRESS NOTES
Patient arrived from room alert & oriented X4 and on 6 L NC when arrived here. Consent  obtained after MD Lynsey Richard spoke with patient.

## 2019-07-19 NOTE — PROGRESS NOTES
Called patient to verify alert & oriented X4, knows about procedure and verified he has not had anything to eat or drink today.

## 2019-07-19 NOTE — PROGRESS NOTES
Bedside report received from [employer], [address] [place] [address]. Assumed care of patient.

## 2019-07-19 NOTE — PROGRESS NOTES
Problem: Breathing Pattern - Ineffective  Goal: *Absence of hypoxia  Outcome: Progressing Towards Goal  Goal: *Use of effective breathing techniques  Outcome: Progressing Towards Goal     Problem: Patient Education: Go to Patient Education Activity  Goal: Patient/Family Education  Outcome: Progressing Towards Goal     Problem: Falls - Risk of  Goal: *Absence of Falls  Description  Document Crissy Jenkins Fall Risk and appropriate interventions in the flowsheet.   Outcome: Progressing Towards Goal  Note:   Fall Risk Interventions:  Mobility Interventions: Bed/chair exit alarm         Medication Interventions: Bed/chair exit alarm, Patient to call before getting OOB                   Problem: Patient Education: Go to Patient Education Activity  Goal: Patient/Family Education  Outcome: Progressing Towards Goal

## 2019-07-19 NOTE — PROGRESS NOTES
Primary Nurse Damari Bean RN and Aretha Beltran RN performed a dual skin assessment on this patient No impairment noted  Zach score is 19

## 2019-07-19 NOTE — H&P
Interventional and Vascular Radiology History and Physical    Patient: Mitzi Wolff 75 y.o. male       Chief Complaint: Shortness of Breath (pt reports shortness of breath x3 weeks since having fluid drained from lungs, oncology sent to ED for evaluation)      History of Present Illness: right pleural effusion     History:    Past Medical History:   Diagnosis Date    Acquired hypothyroidism 3/5/2018    HTN (hypertension)     Hypercholesterolemia     Hyperlipidemia     Laryngeal squamous cell carcinoma ([facility] [address] 2016    Subclinical hypothyroidism      Family History   Problem Relation Age of Onset    Dementia Mother     Alcohol abuse Father      Social History     Socioeconomic History    Marital status:      Spouse name: Not on file    Number of children: Not on file    Years of education: Not on file    Highest education level: Not on file   Occupational History    Not on file   Social Needs    Financial resource strain: Not on file    Food insecurity:     Worry: Not on file     Inability: Not on file    Transportation needs:     Medical: Not on file     Non-medical: Not on file   Tobacco Use    Smoking status: Former Smoker    Smokeless tobacco: Never Used   Substance and Sexual Activity    Alcohol use: No    Drug use: No    Sexual activity: Yes     Partners: Female   Lifestyle    Physical activity:     Days per week: Not on file     Minutes per session: Not on file    Stress: Not on file   Relationships    Social connections:     Talks on phone: Not on file     Gets together: Not on file     Attends Che[employer] service: Not on file     Active member of club or organization: Not on file     Attends meetings of clubs or organizations: Not on file     Relationship status: Not on file    Intimate partner violence:     Fear of current or ex partner: Not on file     Emotionally abused: Not on file     Physically abused: Not on file     Forced sexual activity: Not on file   Other Topics Concern    Not on file   Social History Narrative    Not on file       Allergies: Allergies   Allergen Reactions    Morphine Rash    Lisinopril Other (comments)     dizziness    Propranolol Other (comments)     fatigue       Current Medications:  Current Facility-Administered Medications   Medication Dose Route Frequency    fentaNYL citrate (PF) injection 50 mcg  50 mcg IntraVENous Q4H PRN    sodium chloride (NS) flush 5-40 mL  5-40 mL IntraVENous Q8H    sodium chloride (NS) flush 5-40 mL  5-40 mL IntraVENous PRN    acetaminophen (TYLENOL) tablet 650 mg  650 mg Oral Q6H PRN    ondansetron (ZOFRAN) injection 4 mg  4 mg IntraVENous Q4H PRN    nitroglycerin (NITROBID) 2 % ointment 1 Inch  1 Inch Topical Q6H PRN    albuterol-ipratropium (DUO-NEB) 2.5 MG-0.5 MG/3 ML  3 mL Nebulization Q6H RT    ALPRAZolam (XANAX) tablet 1 mg  1 mg Oral BID PRN    amLODIPine (NORVASC) tablet 5 mg  5 mg Oral DAILY    folic acid (FOLVITE) tablet 1 mg  1 mg Oral DAILY    levothyroxine (SYNTHROID) tablet 50 mcg  50 mcg Oral ACB    lidocaine (XYLOCAINE) 4 % cream   Topical PRN    pantoprazole (PROTONIX) tablet 40 mg  40 mg Oral DAILY    pravastatin (PRAVACHOL) tablet 40 mg  40 mg Oral DAILY     Facility-Administered Medications Ordered in Other Encounters   Medication Dose Route Frequency    heparinized saline 2 units/mL infusion 800 Units  400 mL Irrigation ONCE        Physical Exam:  Blood pressure 126/67, pulse 88, temperature 97.5 °F (36.4 °C), resp. rate 16, height 5' 10\" (1.778 m), weight 59 kg (130 lb), SpO2 100 %.   LUNG: clear to auscultation bilaterally, HEART: regular rate and rhythm, S1, S2 normal, no murmur, click, rub or gallop      Alerts:    Hospital Problems  Date Reviewed: 7/18/2019          Codes Class Noted POA    Acute respiratory failure with hypoxia (HCC) ICD-10-CM: J96.01  ICD-9-CM: 518.81  7/18/2019 Unknown        GERD (gastroesophageal reflux disease) ICD-10-CM: K21.9  ICD-9-CM: 530.81  7/18/2019 Unknown        Anxiety disorder ICD-10-CM: F41.9  ICD-9-CM: 300.00  7/18/2019 Unknown        Lung cancer, primary, with metastasis from lung to other site, right [facility]) ICD-10-CM: C34.91  ICD-9-CM: 162.9  7/18/2019 Unknown        Acquired hypothyroidism ICD-10-CM: E03.9  ICD-9-CM: 244.9  3/5/2018 Yes        Laryngeal squamous cell carcinoma ([facility] [address] ICD-10-CM: C32.9  ICD-9-CM: 161.9  8/19/2016 Yes        HTN (hypertension) ICD-10-CM: I10  ICD-9-CM: 401.9  Unknown Yes        Hyperlipidemia ICD-10-CM: E78.5  ICD-9-CM: 272.4  Unknown Yes              Laboratory:      Recent Labs     07/19/19  0307   HGB 11.9*   HCT 34.5*   WBC 11.5*      BUN 14   CREA 0.74   K 3.8         Plan of Care/Planned Procedure:  Risks, benefits, and alternatives reviewed with patient and he agrees to proceed with the procedure. Conscious sedation will be performed with IV fentanyl and versed.  Plan is for right pleural aspira catheter placement       Maryuri Cabrera MD

## 2019-07-19 NOTE — TELEPHONE ENCOUNTER
Called patient to advise/confirm upcoming appt with Dr. Michelle Rausch on 7/22/19    at 12:30pm at [id number] No answer on home number so left voicemail. Also advised to please bring in your Drivers License and Insurance Card with you to appointment as well as any prescription pain medication in the original container with you to appt.

## 2019-07-19 NOTE — PROGRESS NOTES
General Daily Progress Note    Admit Date: 7/18/2019  Hospital day 2    Subjective:     Patient has no complaint of chest pain. mild dyspnea at rest. Getting a thoracentesis and pleurX catheter later today. ..   Medication side effects: none    Current Facility-Administered Medications   Medication Dose Route Frequency    fentaNYL citrate (PF) injection 50 mcg  50 mcg IntraVENous Q4H PRN    ondansetron (ZOFRAN) injection 4 mg  4 mg IntraVENous Q4H PRN    sodium chloride (NS) flush 5-40 mL  5-40 mL IntraVENous Q8H    sodium chloride (NS) flush 5-40 mL  5-40 mL IntraVENous PRN    acetaminophen (TYLENOL) tablet 650 mg  650 mg Oral Q6H PRN    ondansetron (ZOFRAN) injection 4 mg  4 mg IntraVENous Q4H PRN    nitroglycerin (NITROBID) 2 % ointment 1 Inch  1 Inch Topical Q6H PRN    albuterol-ipratropium (DUO-NEB) 2.5 MG-0.5 MG/3 ML  3 mL Nebulization Q6H RT    ALPRAZolam (XANAX) tablet 1 mg  1 mg Oral BID PRN    amLODIPine (NORVASC) tablet 5 mg  5 mg Oral DAILY    folic acid (FOLVITE) tablet 1 mg  1 mg Oral DAILY    levothyroxine (SYNTHROID) tablet 50 mcg  50 mcg Oral ACB    lidocaine (XYLOCAINE) 4 % cream   Topical PRN    pantoprazole (PROTONIX) tablet 40 mg  40 mg Oral DAILY    pravastatin (PRAVACHOL) tablet 40 mg  40 mg Oral DAILY        Review of Systems  Pertinent items are noted in HPI.     Objective:     Patient Vitals for the past 8 hrs:   BP Temp Pulse Resp SpO2   07/19/19 0355 118/69 97.6 °F (36.4 °C) 90 20 90 %   07/19/19 0157     90 %   07/18/19 2357 105/59 97.3 °F (36.3 °C) 88 20 91 %     07/19 0701 - 07/19 1900  In: -   Out: 880 [SXFOU:301]  07/17 1901 - 07/19 0700  In: -   Out: 200 [Urine:200]    Physical Exam:   Visit Vitals  /69 (BP 1 Location: Left arm, BP Patient Position: At rest)   Pulse 90   Temp 97.6 °F (36.4 °C)   Resp 20   Ht 5' 10\" (1.778 m)   Wt 130 lb (59 kg)   SpO2 90%   BMI 18.65 kg/m²     Lungs: diminished breath sounds R base  Heart: regular rate and rhythm, S1, S2 normal, no murmur, click, rub or gallop  Abdomen: soft, non-tender. Bowel sounds normal. No masses,  no organomegaly  Extremities: extremities normal, atraumatic, no cyanosis or edema      ECG: normal sinus rhythm     Data Review   Recent Results (from the past 24 hour(s))   EKG, 12 LEAD, INITIAL    Collection Time: 07/18/19 12:27 PM   Result Value Ref Range    Ventricular Rate 91 BPM    Atrial Rate 91 BPM    P-R Interval 104 ms    QRS Duration 86 ms    Q-T Interval 358 ms    QTC Calculation (Bezet) 440 ms    Calculated P Axis 31 degrees    Calculated R Axis 67 degrees    Calculated T Axis 57 degrees    Diagnosis       Sinus rhythm with short KS  No previous ECGs available     CBC WITH AUTOMATED DIFF    Collection Time: 07/18/19  1:04 PM   Result Value Ref Range    WBC 15.1 (H) 4.1 - 11.1 K/uL    RBC 4.25 4. 10 - 5.70 M/uL    HGB 12.3 12.1 - 17.0 g/dL    HCT 36.4 (L) 36.6 - 50.3 %    MCV 85.6 80.0 - 99.0 FL    MCH 28.9 26.0 - 34.0 PG    MCHC 33.8 30.0 - 36.5 g/dL    RDW 14.1 11.5 - 14.5 %    PLATELET 057 077 - 828 K/uL    MPV 9.5 8.9 - 12.9 FL    NRBC 0.0 0  WBC    ABSOLUTE NRBC 0.00 0.00 - 0.01 K/uL    NEUTROPHILS 88 (H) 32 - 75 %    LYMPHOCYTES 3 (L) 12 - 49 %    MONOCYTES 6 5 - 13 %    EOSINOPHILS 3 0 - 7 %    BASOPHILS 0 0 - 1 %    IMMATURE GRANULOCYTES 0 0.0 - 0.5 %    ABS. NEUTROPHILS 13.2 (H) 1.8 - 8.0 K/UL    ABS. LYMPHOCYTES 0.5 (L) 0.8 - 3.5 K/UL    ABS. MONOCYTES 0.9 0.0 - 1.0 K/UL    ABS. EOSINOPHILS 0.5 (H) 0.0 - 0.4 K/UL    ABS. BASOPHILS 0.0 0.0 - 0.1 K/UL    ABS. IMM.  GRANS. 0.0 0.00 - 0.04 K/UL    DF AUTOMATED      RBC COMMENTS NORMOCYTIC, NORMOCHROMIC     METABOLIC PANEL, COMPREHENSIVE    Collection Time: 07/18/19  1:04 PM   Result Value Ref Range    Sodium 132 (L) 136 - 145 mmol/L    Potassium 3.6 3.5 - 5.1 mmol/L    Chloride 101 97 - 108 mmol/L    CO2 23 21 - 32 mmol/L    Anion gap 8 5 - 15 mmol/L    Glucose 119 (H) 65 - 100 mg/dL    BUN 14 6 - 20 MG/DL    Creatinine 0.64 (L) 0.70 - 1.30 MG/DL    BUN/Creatinine ratio 22 (H) 12 - 20      GFR est AA >60 >60 ml/min/1.73m2    GFR est non-AA >60 >60 ml/min/1.73m2    Calcium 8.3 (L) 8.5 - 10.1 MG/DL    Bilirubin, total 0.4 0.2 - 1.0 MG/DL    ALT (SGPT) 16 12 - 78 U/L    AST (SGOT) 18 15 - 37 U/L    Alk. phosphatase 76 45 - 117 U/L    Protein, total 7.0 6.4 - 8.2 g/dL    Albumin 2.9 (L) 3.5 - 5.0 g/dL    Globulin 4.1 (H) 2.0 - 4.0 g/dL    A-G Ratio 0.7 (L) 1.1 - 2.2     CK W/ REFLX CKMB    Collection Time: 07/18/19  1:04 PM   Result Value Ref Range    CK 39 39 - 308 U/L   TROPONIN I    Collection Time: 07/18/19  1:04 PM   Result Value Ref Range    Troponin-I, Qt. <0.05 <2.11 ng/mL   METABOLIC PANEL, COMPREHENSIVE    Collection Time: 07/19/19  3:07 AM   Result Value Ref Range    Sodium 131 (L) 136 - 145 mmol/L    Potassium 3.8 3.5 - 5.1 mmol/L    Chloride 102 97 - 108 mmol/L    CO2 21 21 - 32 mmol/L    Anion gap 8 5 - 15 mmol/L    Glucose 177 (H) 65 - 100 mg/dL    BUN 14 6 - 20 MG/DL    Creatinine 0.74 0.70 - 1.30 MG/DL    BUN/Creatinine ratio 19 12 - 20      GFR est AA >60 >60 ml/min/1.73m2    GFR est non-AA >60 >60 ml/min/1.73m2    Calcium 8.2 (L) 8.5 - 10.1 MG/DL    Bilirubin, total 0.3 0.2 - 1.0 MG/DL    ALT (SGPT) 17 12 - 78 U/L    AST (SGOT) 18 15 - 37 U/L    Alk.  phosphatase 73 45 - 117 U/L    Protein, total 7.0 6.4 - 8.2 g/dL    Albumin 2.8 (L) 3.5 - 5.0 g/dL    Globulin 4.2 (H) 2.0 - 4.0 g/dL    A-G Ratio 0.7 (L) 1.1 - 2.2     CBC WITH AUTOMATED DIFF    Collection Time: 07/19/19  3:07 AM   Result Value Ref Range    WBC 11.5 (H) 4.1 - 11.1 K/uL    RBC 4.02 (L) 4.10 - 5.70 M/uL    HGB 11.9 (L) 12.1 - 17.0 g/dL    HCT 34.5 (L) 36.6 - 50.3 %    MCV 85.8 80.0 - 99.0 FL    MCH 29.6 26.0 - 34.0 PG    MCHC 34.5 30.0 - 36.5 g/dL    RDW 13.7 11.5 - 14.5 %    PLATELET 432 877 - 884 K/uL    MPV 9.2 8.9 - 12.9 FL    NRBC 0.0 0  WBC    ABSOLUTE NRBC 0.00 0.00 - 0.01 K/uL    NEUTROPHILS 96 (H) 32 - 75 %    LYMPHOCYTES 2 (L) 12 - 49 %    MONOCYTES 2 (L) 5 - 13 %    EOSINOPHILS 0 0 - 7 %    BASOPHILS 0 0 - 1 %    IMMATURE GRANULOCYTES 0 0.0 - 0.5 %    ABS. NEUTROPHILS 11.0 (H) 1.8 - 8.0 K/UL    ABS. LYMPHOCYTES 0.3 (L) 0.8 - 3.5 K/UL    ABS. MONOCYTES 0.2 0.0 - 1.0 K/UL    ABS. EOSINOPHILS 0.0 0.0 - 0.4 K/UL    ABS. BASOPHILS 0.0 0.0 - 0.1 K/UL    ABS. IMM. GRANS. 0.0 0.00 - 0.04 K/UL    DF AUTOMATED             Assessment:     Active Problems:    HTN (hypertension) ()      Hyperlipidemia ()      Laryngeal squamous cell carcinoma (HCC) (8/19/2016)      Acquired hypothyroidism (3/5/2018)      Acute respiratory failure with hypoxia (HCC) (7/18/2019)      GERD (gastroesophageal reflux disease) (7/18/2019)      Anxiety disorder (7/18/2019)      Lung cancer, primary, with metastasis from lung to other site, right ([facility] [address] (7/18/2019)        Plan:     1. Large recurrent R pleural effusion. Hx lung cancer with mets to bones and positive cytology of effusion. Apparently hasnt tolerated treatment very well    2. Prior hx laryngeal cancer 2016.   3. Mild hyperglycemia- will check A1C  4. Poor prognosis- palliative care following.

## 2019-07-19 NOTE — PROGRESS NOTES
0720  Report received from Lynette Pool, [facility]. SBAR, Kardex, ED Summary, Procedure Summary, Intake/Output, MAR, Accordion, Recent Results, Med Rec Status and Cardiac Rhythm NSR were discussed.     [address]

## 2019-07-19 NOTE — PROGRESS NOTES
Bedside report given to TREVER Mays that included SBAR, recent results, and cardiac rhythm. No acute events overnight.

## 2019-07-19 NOTE — CONSULTS
Palliative Medicine Consult  Ag: [phone]    Patient Name: Flor Landa  YOB: 1945    Date of Initial Consult: 7/18/2019   Reason for Consult: Goals of Care Discussion  Requesting Provider: Dr. Taniya Gallegos  Primary Care Physician: Daren Griffin MD     SUMMARY:   Flor Landa is a 75 y.o. with a past history of squamouse cell carcinoma of larynx (dx 2016, s/p radiation and chemo, now in remission), Lung Cancer with mets to the bones (dx 3/2019) ,Right sided pleural effusion s/p thoracentesis 7/2019, HTN, Hypothyroidism, GERD, Anxiety who was admitted on 7/18/2019 from home with a diagnosis of Reoccurring Malignant Pleural Effusion. Patient presented to the ER with CC of shortness of breath. At the time of our visit, patient is still in ER, has been placed on supplemental O2, resulting in improved o2 sats from mid 80s to low 90s. Patient has not yet had Chest Xray. Spoke with ER physician Dr. Marcy Lee, she stated that they will likely admit patient based on his need for O2 supplementation. Patients primary oncologist is Dr. Sarai Nichols, he last saw patient early July. Patient has also seen Dr. Marylene Hopping with out patient palliative medicine x2, last appointment 5/2019. Current medical issues leading to Palliative Medicine involvement include: Goals of Care in setting of ES Disease     PALLIATIVE DIAGNOSES:   1. Advanced care planning discussion  2. Goals of care discussion  3. DNR discussion  4. Shortness of breath  5. fatigue       PLAN:   1. Received referral  from out-patient palliative medicine team, as Dr. Sarai Nichols had called Dr. Taniya Gallegos requesting that patient be seen, that patient has ES metastatic cancer and may be able to offer direct hospice admission. Prior to visit, I completed chart review, including documentation from out patient oncology and out patient palliative medicine.   2. Palliative SW melinda and myself met with the patient, he was still in the ER, not yet admitted and waiting for imaging of his chest to be done, his step daughter was at bedside. We introduced ourself and role of palliative medicine. We assess patients understanding of illness and prognosis, as well as course of events over the past 6 months and brief psychosocial hx.  3. Patient able to tell us that he was diagnosed with lung cancer with mets to the bones, telling us that there had been spots seen on imaging for a long time, but they had never changed, not until around march 2019. Patient described his course of treatment, stated that though the cancer was not curable, the combination of treatments he is receiving can keep it from progressing. 4. We explained to the patient that he has very advanced metastatic cancer and that his oncologist did not think he was benefiting from the treatments any longer, that he had a poor prognosis. This information came as a surprise to the patient and his daughter, the daughter had many questions about this, the patient asked if the oncology team could come see him, that he needed to talk with oncology, to hear if from them before they could discuss goals of care. 5. Advanced Care Planning Discussion- Patient has a copy of a completed AMD in his medical record. He has designated his wife as his primary health care decision maker. He also indicated that in the event he was found to be close to death, he would not want any life sustaining interventions or treatments. 6. Goals of care Discussion- Patient in Er, they have not completed evaluation, imaging of chest pending. Patient hopeful they will be able to determine cause of acute SOB. We discussed recent hx of pleural effusion resulting in paracentesis. We discussed the possibility of reoccurying pleural effusion. Patient hopeful that once he is discharged home, he will be able to f/u with oncology and continue treatments. 7. DNR Discussion- No code status in chart at time of visit.  However, he did eventually elect DNR, EMR updated to reflect wishes. · .Palliative to f/u 7/19 for DDNR  8. Shortness of breath- Resolved since starting supplemental o2 nasal cannula. Etiology includes: Lung ca, possible re-occurying pleural effusion based on having had one recently, required thoracentesis. · Possible need for home based supplemental O2?  9. Initial consult note routed to primary continuity provider and/or primary health care team members  10. Communicated plan of care with: Palliative IDT, [place] [facility] Team, Dr Alice Roca, Ciarra Leon NP     GOALS OF CARE / TREATMENT PREFERENCES:     GOALS OF CARE:  Patient/Health Care Proxy Stated Goals: Prolong life    TREATMENT PREFERENCES:   Code Status: DNR    Advance Care Planning:  [x] The [facility] Interdisciplinary Team has updated the ACP Navigator with Health Care Decision Maker and Patient Capacity      Primary Decision Maker: Balbina Francisco - [phone]  Advance Care Planning 7/18/2019   Patient's Healthcare Decision Maker is: -   Confirm Advance Directive Yes, on file   Patient Would Like to Complete Advance Directive -       Medical Interventions: Limited additional interventions     Other Instructions:  Artificially Administered Nutrition: No feeding tube     Other:    As far as possible, the palliative care team has discussed with patient / health care proxy about goals of care / treatment preferences for patient.      HISTORY:     History obtained from medical records and patient    CHIEF COMPLAINT: Denies    HPI/SUBJECTIVE:    The patient is:   [x] Verbal and participatory  [] Non-participatory due to:   patient stated he came in due to shortness of breath, howeverhe reported feeling better since starting supplemental O2, denies pain, reports poor appetite    See palliative ESAS for symptoms    Clinical Pain Assessment (nonverbal scale for severity on nonverbal patients):   Clinical Pain Assessment  Severity: 0          Duration: for how long has pt been experiencing pain (e.g., 2 days, 1 month, years)  Frequency: how often pain is an issue (e.g., several times per day, once every few days, constant)     FUNCTIONAL ASSESSMENT:     Palliative Performance Scale (PPS):  PPS: 50       PSYCHOSOCIAL/SPIRITUAL SCREENING:     Palliative IDT has assessed this patient for cultural preferences / practices and a referral made as appropriate to needs (Cultural Services, Patient Advocacy, Ethics, etc.)    Any spiritual / Orthodox concerns:  [] Yes /  [x] No    Caregiver Burnout:  [] Yes /  [x] No /  [] No Caregiver Present      Anticipatory grief assessment:   [x] Normal  / [] Maladaptive       ESAS Anxiety: Anxiety: 0    ESAS Depression: Depression: 0        REVIEW OF SYSTEMS:     Positive and pertinent negative findings in ROS are noted above in HPI. The following systems were [x] reviewed / [] unable to be reviewed as noted in HPI  Other findings are noted below. Systems: constitutional, ears/nose/mouth/throat, respiratory, gastrointestinal, genitourinary, musculoskeletal, integumentary, neurologic, psychiatric, endocrine. Positive findings noted below. Modified ESAS Completed by: provider   Fatigue: 2 Drowsiness: 0   Depression: 0 Pain: 0   Anxiety: 0 Nausea: 0   Anorexia: 4 Dyspnea: 0                    PHYSICAL EXAM:     From RN flowsheet:  Wt Readings from Last 3 Encounters:   07/18/19 59 kg (130 lb)   07/02/19 60.8 kg (134 lb)   07/01/19 60.7 kg (133 lb 14.4 oz)     Blood pressure 118/69, pulse 90, temperature 97.6 °F (36.4 °C), resp. rate 20, height 5' 10\" (1.778 m), weight 59 kg (130 lb), SpO2 90 %.     Pain Scale 1: Numeric (0 - 10)  Pain Intensity 1: 0                 Last bowel movement, if known:     Constitutional: Appears younger than stated age, under nourished, thin and frail, pleasant, NAD  Eyes: pupils equal, anicteric  ENMT: no nasal discharge, moist mucous membranes  Cardiovascular: regular rhythm, distal pulses intact  Respiratory: breathing not labored, symmetric, on nasal cannula medical records and patient  Gastrointestinal: soft non-tender, +bowel sounds  Musculoskeletal: no deformity, no tenderness to palpation  Skin: warm, dry  Neurologic: following commands, moving all extremities  Psychiatric: full affect, no hallucinations  Other:       HISTORY:     Active Problems:    HTN (hypertension) ()      Hyperlipidemia ()      Laryngeal squamous cell carcinoma ([facility] [address] (8/19/2016)      Acquired hypothyroidism (3/5/2018)      Acute respiratory failure with hypoxia (HCC) (7/18/2019)      GERD (gastroesophageal reflux disease) (7/18/2019)      Anxiety disorder (7/18/2019)      Lung cancer, primary, with metastasis from lung to other site, right ([facility] [address] (7/18/2019)      Past Medical History:   Diagnosis Date    Acquired hypothyroidism 3/5/2018    HTN (hypertension)     Hypercholesterolemia     Hyperlipidemia     Laryngeal squamous cell carcinoma ([facility] 2016    Subclinical hypothyroidism       Past Surgical History:   Procedure Laterality Date    HX ORTHOPAEDIC      Back surgery x3    IR INSERT TUNL CVC W PORT OVER 5 YEARS  3/14/2019      Family History   Problem Relation Age of Onset    Dementia Mother     Alcohol abuse Father       History reviewed, no pertinent family history.   Social History     Tobacco Use    Smoking status: Former Smoker    Smokeless tobacco: Never Used   Substance Use Topics    Alcohol use: No     Allergies   Allergen Reactions    Morphine Rash    Lisinopril Other (comments)     dizziness    Propranolol Other (comments)     fatigue      Current Facility-Administered Medications   Medication Dose Route Frequency    fentaNYL citrate (PF) injection 50 mcg  50 mcg IntraVENous Q4H PRN    ondansetron (ZOFRAN) injection 4 mg  4 mg IntraVENous Q4H PRN    sodium chloride (NS) flush 5-40 mL  5-40 mL IntraVENous Q8H    sodium chloride (NS) flush 5-40 mL  5-40 mL IntraVENous PRN    acetaminophen (TYLENOL) tablet 650 mg  650 mg Oral Q6H PRN    ondansetron (ZOFRAN) injection 4 mg  4 mg IntraVENous Q4H PRN    nitroglycerin (NITROBID) 2 % ointment 1 Inch  1 Inch Topical Q6H PRN    albuterol-ipratropium (DUO-NEB) 2.5 MG-0.5 MG/3 ML  3 mL Nebulization Q6H RT    ALPRAZolam (XANAX) tablet 1 mg  1 mg Oral BID PRN    amLODIPine (NORVASC) tablet 5 mg  5 mg Oral DAILY    folic acid (FOLVITE) tablet 1 mg  1 mg Oral DAILY    levothyroxine (SYNTHROID) tablet 50 mcg  50 mcg Oral ACB    lidocaine (XYLOCAINE) 4 % cream   Topical PRN    pantoprazole (PROTONIX) tablet 40 mg  40 mg Oral DAILY    pravastatin (PRAVACHOL) tablet 40 mg  40 mg Oral DAILY          LAB AND IMAGING FINDINGS:     Lab Results   Component Value Date/Time    WBC 11.5 (H) 07/19/2019 03:07 AM    HGB 11.9 (L) 07/19/2019 03:07 AM    PLATELET 175 12/67/3692 03:07 AM     Lab Results   Component Value Date/Time    Sodium 131 (L) 07/19/2019 03:07 AM    Potassium 3.8 07/19/2019 03:07 AM    Chloride 102 07/19/2019 03:07 AM    CO2 21 07/19/2019 03:07 AM    BUN 14 07/19/2019 03:07 AM    Creatinine 0.74 07/19/2019 03:07 AM    Calcium 8.2 (L) 07/19/2019 03:07 AM    Magnesium 1.6 10/18/2016 10:24 AM    Phosphorus 2.1 (L) 07/01/2019 08:16 AM      Lab Results   Component Value Date/Time    AST (SGOT) 18 07/19/2019 03:07 AM    Alk. phosphatase 73 07/19/2019 03:07 AM    Protein, total 7.0 07/19/2019 03:07 AM    Albumin 2.8 (L) 07/19/2019 03:07 AM    Globulin 4.2 (H) 07/19/2019 03:07 AM     No results found for: INR, PTMR, PTP, PT1, PT2, APTT   No results found for: IRON, FE, TIBC, IBCT, PSAT, FERR   No results found for: PH, PCO2, PO2  No components found for: GLPOC   No results found for: CPK, CKMB             Total time: 70 min  Counseling / coordination time, spent as noted above: 55 min  > 50% counseling / coordination?: yes    Prolonged service was provided for  []30 min   []75 min in face to face time in the presence of the patient, spent as noted above.   Time Start:   Time End:   Note: this can only be billed with 31115 (initial) or 71842 (follow up). If multiple start / stop times, list each separately.

## 2019-07-20 NOTE — PROGRESS NOTES
Problem: Breathing Pattern - Ineffective  Goal: *Absence of hypoxia  Outcome: Progressing Towards Goal  Goal: *Use of effective breathing techniques  Outcome: Progressing Towards Goal     Problem: Patient Education: Go to Patient Education Activity  Goal: Patient/Family Education  Outcome: Progressing Towards Goal     Problem: Falls - Risk of  Goal: *Absence of Falls  Description  Document Lori Trimble Fall Risk and appropriate interventions in the flowsheet.   Outcome: Progressing Towards Goal  Note:   Fall Risk Interventions:  Mobility Interventions: Patient to call before getting OOB, PT Consult for mobility concerns, PT Consult for assist device competence         Medication Interventions: Assess postural VS orthostatic hypotension, Evaluate medications/consider consulting pharmacy, Patient to call before getting OOB, Teach patient to arise slowly, Utilize gait belt for transfers/ambulation                   Problem: Patient Education: Go to Patient Education Activity  Goal: Patient/Family Education  Outcome: Progressing Towards Goal

## 2019-07-20 NOTE — PROGRESS NOTES
INTERNAL MEDICINE ATTENDING NOTE    S: Mr. Storm Caldwell is a patient of Selin Morocho MD and was seen by me today during rounds. At this time, he is resting comfortably. The patient has no new complaints today. ROS otherwise unremarkable except as noted elsewhere. O: Blood pressure 109/59, pulse 74, temperature 97.6 °F (36.4 °C), resp. rate 20, height 5' 10\" (1.778 m), weight 130 lb (59 kg), SpO2 96 %. Gen: Patient is in no acute distress. Lungs: CTAB. Heart: RRR. Abd: S, NT, ND, BS present. Extremities: Warm. No results found for this or any previous visit (from the past 12 hour(s)). A / P: Active Problems:    HTN (hypertension) ()      Hyperlipidemia ()      Laryngeal squamous cell carcinoma ([facility] [address] (8/19/2016)      Acquired hypothyroidism (3/5/2018)      Acute respiratory failure with hypoxia (HCC) (7/18/2019)      GERD (gastroesophageal reflux disease) (7/18/2019)      Anxiety disorder (7/18/2019)      Lung cancer, primary, with metastasis from lung to other site, right ([facility] [address] (7/18/2019)       P:   1. Large recurrent R pleural effusion. Thoracentesis with aspira drain. Hx lung cancer with mets to bones and positive cytology of effusion. Apparently hasn't tolerated treatment very well. Oncology following. 2. Prior hx laryngeal cancer 2016.   3. Mild hyperglycemia- A1C fine  4. Poor prognosis- palliative care following.       Maty Fu MD, FACP  Best contact is via Pager: [phone], or via hospital  at [phone]

## 2019-07-20 NOTE — PROGRESS NOTES
Bedside report given to TREVER Mays that included SBAR and cardiac rhythm. No acute events overnight.

## 2019-07-20 NOTE — PROGRESS NOTES
0720  Report received from [facility]. SBAR, Kardex, ED Summary, OR Summary, Procedure Summary, Intake/Output, MAR, Accordion, Recent Results, Med Rec Status and Cardiac Rhythm NSR were discussed. Tabatha Crenshaw     Home Oxygen Test  Date of test: 7/20/2019  Time of test: 1015    Sa02 88 % on room air AT REST. Sa02 83 % on room air DURING AMBULATION. Sa02 84 % on 2 Liters DURING AMBULATION. Sa02 87 % on 3 Liters DURING AMBULATION. Sa02 90 % on 4 Liters DURING AMBULATION. Sa02 95 % on 4 Liters AT REST/AFTER AMBULATION. Bedside shift change report GIVEN TO Mik Ybarra RN. Report included the following information SBAR, Kardex, ED Summary, Procedure Summary, Intake/Output, MAR, Accordion, Recent Results, Med Rec Status and Cardiac Rhythm NSR.         SIGNIFICANT CHANGES DURING SHIFT:        CONCERNS TO ADDRESS WITH MD:  Patient would like and order for Flonase and would like to take his home zantac from home and not Protonix. [facility] NURSING NOTE   Admission Date 7/18/2019   Admission Diagnosis Acute respiratory failure with hypoxia ([facility] [address] [J96.01]   Consults IP CONSULT TO PALLIATIVE CARE - PROVIDER  IP CONSULT TO HEMATOLOGY  IP CONSULT TO PRIMARY CARE PROVIDER  IP CONSULT TO HOSPITALIST      Cardiac Monitoring [x] Yes [] No      Purposeful Hourly Rounding [x] Yes    Geeta Score Total Score: 2   Geeta score 3 or > [] Bed Alarm [] Avasys [] 1:1 sitter [] Patient refused (Signed refusal form in chart)   Zach Score Zach Score: 18   Zach score 14 or < [] PMT consult [] Wound Care consult    []  Specialty bed  [] Nutrition consult      Influenza Vaccine Received Flu Vaccine for Current Season (usually Sept-March): Not Flu Season           Oxygen needs? [] Room air Oxygen @  []1L    []2L    [x]3L   []4L    []5L   []6L via  NC   Chronic home O2 use?  [] Yes [x] No  Perform O2 challenge test and document in progress note using smartphrase (.Homeoxygen)      Last bowel movement Last Bowel Movement Date: 07/20/19      Urinary Catheter             LDAs             Venous Access Device smart port LOT [id number] 03/14/19 Upper chest (subclavicular area, right (Active)   Central Line Being Utilized Yes 7/20/2019  4:00 PM   Criteria for Appropriate Use Limited/no vessel suitable for conventional peripheral access 7/20/2019  4:00 PM   Site Assessment Clean, dry, & intact 7/20/2019  4:00 PM   Date of Last Dressing Change 07/18/19 7/20/2019  4:00 PM   Dressing Status Clean, dry, & intact 7/20/2019  4:00 PM   Dressing Type Disk with Chlorhexadine gluconate (CHG) 7/20/2019  4:00 PM   Action Taken Blood drawn 7/19/2019  3:48 PM   Positive Blood Return (Medial Site) Yes 7/20/2019  4:00 PM   Alcohol Cap Used Yes 7/20/2019  4:00 PM            Drain Aspira Drain 07/19/19 Right (Active)   Site Assessment Clean, dry, & intact 7/20/2019  4:00 PM   Dressing Status Clean, dry, & intact 7/20/2019  4:00 PM   Status Clamped 7/20/2019  4:00 PM   Drainage Description Serosanguinous 7/20/2019  4:00 PM                   Readmission Risk Assessment Tool Score Medium Risk            17       Total Score        3 Has Seen PCP in Last 6 Months (Yes=3, No=0)    5 Pt. Coverage (Medicare=5 , Medicaid, or Self-Pay=4)    9 Charlson Comorbidity Score (Age + Comorbid Conditions)        Criteria that do not apply:    . Living with Significant Other. Assisted Living. LTAC. SNF.  or   Rehab    Patient Length of Stay (>5 days = 3)    IP Visits Last 12 Months (1-3=4, 4=9, >4=11)       Expected Length of Stay 5d 7h   Actual Length of Stay 2

## 2019-07-20 NOTE — PROGRESS NOTES
ADULT PROTOCOL: JET AEROSOL  REASSESSMENT    Patient  Luis Burks     75 y.o.   male     7/19/2019  9:32 PM    Breath Sounds Pre Procedure: Right Breath Sounds: Diminished                               Left Breath Sounds: Diminished    Breath Sounds Post Procedure: Right Breath Sounds: Diminished                                 Left Breath Sounds: Diminished    Breathing pattern: Pre procedure Breathing Pattern: Regular          Post procedure Breathing Pattern: Regular    Heart Rate: Pre procedure Pulse: 79           Post procedure Pulse: 82    Resp Rate: Pre procedure Respirations: 20           Post procedure Respirations: 20    Peak Flow: Pre bronchodilator             Post bronchodilator       FVC/FEV1:  n/a    Incentive Spirometry:             Cough: Pre procedure                 Post procedure      Suctioned: NO    Sputum: Pre procedure                   Post procedure      Oxygen: O2 Device: Nasal cannula   Flow rate (L/min) 3LPM     Changed: NO    SpO2: Pre procedure SpO2: 93 %   with oxygen              Post procedure SpO2: 94 %  with oxygen    Nebulizer Therapy: Current medications Aerosolized Medications: DuoNeb      Changed: NO    Smoking History: n/a    Problem List:   Patient Active Problem List   Diagnosis Code    HTN (hypertension) I10    Hyperlipidemia E78.5    Laryngeal squamous cell carcinoma (HCC) C32.9    Subclinical hypothyroidism E03.9    Acquired hypothyroidism E03.9    Acute respiratory failure with hypoxia ([facility] [address] J96.01    GERD (gastroesophageal reflux disease) K21.9    Anxiety disorder F41.9    Lung cancer, primary, with metastasis from lung to other site, right [facility]) C34.91       Respiratory Therapist: vYon Walton, RT

## 2019-07-21 NOTE — PROGRESS NOTES
Problem: Breathing Pattern - Ineffective  Goal: *Absence of hypoxia  Outcome: Progressing Towards Goal  Goal: *Use of effective breathing techniques  Outcome: Progressing Towards Goal     Problem: Patient Education: Go to Patient Education Activity  Goal: Patient/Family Education  Outcome: Progressing Towards Goal     Problem: Falls - Risk of  Goal: *Absence of Falls  Description  Document [facility] Fall Risk and appropriate interventions in the flowsheet. Outcome: Progressing Towards Goal  Note:   Fall Risk Interventions:  Mobility Interventions: Patient to call before getting OOB         Medication Interventions: Patient to call before getting OOB                   Problem: Patient Education: Go to Patient Education Activity  Goal: Patient/Family Education  Outcome: Progressing Towards Goal     Problem: Pressure Injury - Risk of  Goal: *Prevention of pressure injury  Description  Document Zach Scale and appropriate interventions in the flowsheet. Outcome: Progressing Towards Goal  Note:   Pressure Injury Interventions:  Sensory Interventions: Keep linens dry and wrinkle-free         Activity Interventions: Increase time out of bed, Pressure redistribution bed/mattress(bed type), PT/OT evaluation    Mobility Interventions: Float heels, Pressure redistribution bed/mattress (bed type), PT/OT evaluation, Turn and reposition approx.  every two hours(pillow and wedges)    Nutrition Interventions: Document food/fluid/supplement intake                     Problem: Patient Education: Go to Patient Education Activity  Goal: Patient/Family Education  Outcome: Progressing Towards Goal     Problem: Infection - Risk of, Central Venous Catheter-Associated Bloodstream Infection  Goal: *Absence of infection signs and symptoms  Outcome: Progressing Towards Goal     Problem: Patient Education: Go to Patient Education Activity  Goal: Patient/Family Education  Outcome: Progressing Towards Goal

## 2019-07-21 NOTE — PROGRESS NOTES
Bedside shift change report GIVEN TO Hudson Taylor RN. Report included the following information SBAR. SIGNIFICANT CHANGES DURING SHIFT:    0700  Report received from TREVER Schrader  1600  O2 challenge performed       CONCERNS TO ADDRESS WITH MD:            [facility] NURSING NOTE   Admission Date 7/18/2019   Admission Diagnosis Acute respiratory failure with hypoxia ([address] [J96.01]   Consults IP CONSULT TO PALLIATIVE CARE - PROVIDER  IP CONSULT TO HEMATOLOGY  IP CONSULT TO PRIMARY CARE PROVIDER  IP CONSULT TO HOSPITALIST      Cardiac Monitoring [x] Yes [] No      Purposeful Hourly Rounding [x] Yes    Geeta Score Total Score: 2   Getea score 3 or > [x] Bed Alarm [] Avasys [] 1:1 sitter [] Patient refused (Signed refusal form in chart)   Zach Score Zach Score: 18   Zach score 14 or < [] PMT consult [] Wound Care consult    []  Specialty bed  [] Nutrition consult      Influenza Vaccine Received Flu Vaccine for Current Season (usually Sept-March): Not Flu Season           Oxygen needs? [x] Room air Oxygen @  []1L    []2L    []3L   []4L    []5L   []6L via  NC   Chronic home O2 use?  [] Yes [] No  Perform O2 challenge test and document in progress note using smartphrase (.Homeoxygen)      Last bowel movement Last Bowel Movement Date: 07/20/19      Urinary Catheter             LDAs             Venous Access Device smart port LOT [id number] 03/14/19 Upper chest (subclavicular area, right (Active)   Central Line Being Utilized Yes 7/21/2019  8:13 AM   Criteria for Appropriate Use Limited/no vessel suitable for conventional peripheral access 7/21/2019  8:13 AM   Site Assessment Clean, dry, & intact 7/21/2019  8:13 AM   Date of Last Dressing Change 07/18/19 7/21/2019  8:13 AM   Dressing Status Clean, dry, & intact 7/21/2019  8:13 AM   Dressing Type Disk with Chlorhexadine gluconate (CHG) 7/21/2019  8:13 AM   Action Taken Blood drawn 7/19/2019  3:48 PM   Positive Blood Return (Medial Site) Yes 7/20/2019  4:00 PM   Alcohol Cap Used Yes 7/21/2019  8:13 AM            Drain Aspira Drain 07/19/19 Right (Active)   Site Assessment Clean, dry, & intact 7/21/2019  8:13 AM   Dressing Status Clean, dry, & intact 7/21/2019  8:13 AM   Status Clamped 7/21/2019  8:13 AM   Drainage Description Serosanguinous 7/21/2019  8:13 AM                   Readmission Risk Assessment Tool Score Medium Risk            17       Total Score        3 Has Seen PCP in Last 6 Months (Yes=3, No=0)    5 Pt. Coverage (Medicare=5 , Medicaid, or Self-Pay=4)    9 Charlson Comorbidity Score (Age + Comorbid Conditions)        Criteria that do not apply:    . Living with Significant Other. Assisted Living. LTAC. SNF.  or   Rehab    Patient Length of Stay (>5 days = 3)    IP Visits Last 12 Months (1-3=4, 4=9, >4=11)       Expected Length of Stay 5d 7h   Actual Length of Stay 3

## 2019-07-21 NOTE — PROGRESS NOTES
ADULT PROTOCOL: JET AEROSOL ASSESSMENT    Patient  Sharon Mayer     75 y.o.   male     7/20/2019  10:49 PM    Breath Sounds Pre Procedure: Right Breath Sounds: Clear                               Left Breath Sounds: Clear    Breath Sounds Post Procedure: Right Breath Sounds: Diminished                                 Left Breath Sounds: Diminished    Breathing pattern: Pre procedure Breathing Pattern: Regular          Post procedure Breathing Pattern: Regular    Heart Rate: Pre procedure Pulse: 77           Post procedure Pulse: 82    Resp Rate: Pre procedure Respirations: 18           Post procedure Respirations: 20    Peak Flow: Pre bronchodilator             Post bronchodilator       FVC/FEV1:  N/A    Incentive Spirometry:             Cough: Pre procedure Cough: Non-productive               Post procedure      Suctioned: NO    Sputum: Pre procedure                   Post procedure      Oxygen: O2 Device: Nasal cannula   3L NC     Changed: NO    SpO2: Pre procedure SpO2: 95 %   with oxygen              Post procedure SpO2: 94 %  with oxygen    Nebulizer Therapy: Current medications Aerosolized Medications: DuoNeb      Changed: NO    Smoking History:   Smoking status: Former Smoker     Smokeless tobacco: Never Used       Problem List:   Patient Active Problem List   Diagnosis Code    HTN (hypertension) I10    Hyperlipidemia E78.5    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

## 2019-07-21 NOTE — PROGRESS NOTES
Problem: Breathing Pattern - Ineffective  Goal: *Absence of hypoxia  Outcome: Progressing Towards Goal  Goal: *Use of effective breathing techniques  Outcome: Progressing Towards Goal     Problem: Patient Education: Go to Patient Education Activity  Goal: Patient/Family Education  Outcome: Progressing Towards Goal     Problem: Falls - Risk of  Goal: *Absence of Falls  Description  Document Sarath Gordon Fall Risk and appropriate interventions in the flowsheet.   Outcome: Progressing Towards Goal  Note:   Fall Risk Interventions:  Mobility Interventions: PT Consult for mobility concerns         Medication Interventions: Teach patient to arise slowly

## 2019-07-21 NOTE — PROGRESS NOTES
INTERNAL MEDICINE ATTENDING NOTE    S: Mr. Parish Dewitt is a patient of Felicia Patel MD and was seen by me today during rounds. At this time, he is resting comfortably. The patient has no new complaints today. His oxygen has been weaned off. ROS otherwise unremarkable except as noted elsewhere. O: Blood pressure 114/56, pulse 75, temperature 97.6 °F (36.4 °C), resp. rate 20, height 5' 10\" (1.778 m), weight 130 lb (59 kg), SpO2 94 %. Gen: Patient is in no acute distress. Lungs: distant BS. Heart: RRR. Abd: S, NT, ND, BS present. Extremities: Warm. No results found for this or any previous visit (from the past 12 hour(s)). A / P: Active Problems:    HTN (hypertension) ()      Hyperlipidemia ()      Laryngeal squamous cell carcinoma ([facility] [address] (8/19/2016)      Acquired hypothyroidism (3/5/2018)      Acute respiratory failure with hypoxia (HCC) (7/18/2019)      GERD (gastroesophageal reflux disease) (7/18/2019)      Anxiety disorder (7/18/2019)      Lung cancer, primary, with metastasis from lung to other site, right ([address] (7/18/2019)       P:   1. Large recurrent R pleural effusion. Thoracentesis with aspira drain. Hx lung cancer with mets to bones and positive cytology of effusion. Apparently hasn't tolerated treatment very well. Oncology following. 2. Prior hx laryngeal cancer 2016.   3. Mild hyperglycemia- A1C fine  4. Poor prognosis- palliative care following. Looking to discharge with home hospice, possibly in AM.     DNR--comfort care.      Danii Rodriguez MD, FACP  Best contact is via Pager: [phone], or via hospital  at [phone]

## 2019-07-21 NOTE — PROGRESS NOTES
Home Oxygen Test  Date of test: 7/21/2019  Time of test: 1600    Sa02 92 % on room air AT REST. Sa02 89% on room air DURING AMBULATION. Pt took a few deep breaths, Sa02 returned to 91% with no supplemental oxygen   Sa02 93% on room air AT REST/AFTER AMBULATION.

## 2019-07-22 NOTE — PROGRESS NOTES
Bedside report given to TREVER Landaverde that included SBAR, recent results, and cardiac rhythm. No acute events overnight.

## 2019-07-22 NOTE — HOSPICE
[facility]  [phone]     Patient Name: Kim Alfaro  YOB: 1945  Age: 75 y.o. [facility] RN Note:  Hospice consult received, reviewing chart. Will follow up with Unit Nurse and Care Manager to discuss plan of care, patient status and discharge disposition within the hour. Hospice RN , Marie, met with patient at bedside. Patient would like to be discharged home ot hospice today. Per Marie D/Cset for for 07/22 admission, no meds needed, equipment set up through [facility]. Intake notified. CM notified. See PreAdmit report for details    Thank you for the opportunity to be of service to this patient.

## 2019-07-22 NOTE — PROGRESS NOTES
KATRINA plan: Pt will discharge home today, transported by his wife in a personal vehicle. Pt will receive home hospice services through [facility] - admission scheduled for 3:00 pm at pt's home. Pt and wife all in agreement. CM sent In Basket message to DAVID Mcmullen to inform of pt KATIRNA plan. No further concerns indicated at this time. Medicare pt has received, reviewed, and signed 2nd IM letter informing them of their right to appeal the discharge. Signed copy has been placed on pt bedside chart. Care Management Interventions  PCP Verified by CM: Yes(Dr. Yazmin Rush)  Mode of Transport at Discharge:  Other (see comment)(wife)  Transition of Care Consult (CM Consult): Sury: Yes  MyChart Signup: No  Discharge Durable Medical Equipment: No  Physical Therapy Consult: No  Occupational Therapy Consult: No  Speech Therapy Consult: No  Current Support Network: Lives with Spouse, Own Home(lives with wife)  Confirm Follow Up Transport: Family(wife will transport)  Plan discussed with Pt/Family/Caregiver: Yes(dicsussed with pt and wife at the bedside)  Freedom of Choice Offered: Yes  Discharge Location  Discharge Placement: Home with hospice    ADRIÁN Alvarez  Care Manager  [phone]

## 2019-07-22 NOTE — PROGRESS NOTES
Problem: Breathing Pattern - Ineffective  Goal: *Absence of hypoxia  Outcome: Progressing Towards Goal  Goal: *Use of effective breathing techniques  Outcome: Progressing Towards Goal     Problem: Patient Education: Go to Patient Education Activity  Goal: Patient/Family Education  Outcome: Progressing Towards Goal     Problem: Falls - Risk of  Goal: *Absence of Falls  Description  Document Livan Weston Fall Risk and appropriate interventions in the flowsheet. Outcome: Progressing Towards Goal  Note:   Fall Risk Interventions:  Mobility Interventions: PT Consult for mobility concerns         Medication Interventions: Teach patient to arise slowly                   Problem: Patient Education: Go to Patient Education Activity  Goal: Patient/Family Education  Outcome: Progressing Towards Goal     Problem: Pressure Injury - Risk of  Goal: *Prevention of pressure injury  Description  Document Zach Scale and appropriate interventions in the flowsheet.   Outcome: Progressing Towards Goal  Note:   Pressure Injury Interventions:  Sensory Interventions: Discuss PT/OT consult with provider, Keep linens dry and wrinkle-free         Activity Interventions: Chair cushion, Increase time out of bed, PT/OT evaluation    Mobility Interventions: Chair cushion, Float heels, PT/OT evaluation    Nutrition Interventions: Document food/fluid/supplement intake, Offer support with meals,snacks and hydration                     Problem: Patient Education: Go to Patient Education Activity  Goal: Patient/Family Education  Outcome: Progressing Towards Goal     Problem: Infection - Risk of, Central Venous Catheter-Associated Bloodstream Infection  Goal: *Absence of infection signs and symptoms  Outcome: Progressing Towards Goal     Problem: Patient Education: Go to Patient Education Activity  Goal: Patient/Family Education  Outcome: Progressing Towards Goal

## 2019-07-22 NOTE — PROGRESS NOTES
Problem: Breathing Pattern - Ineffective  Goal: *Absence of hypoxia  Outcome: Resolved/Not Met  Goal: *Use of effective breathing techniques  Outcome: Resolved/Not Met     Problem: Patient Education: Go to Patient Education Activity  Goal: Patient/Family Education  Outcome: Resolved/Not Met     Problem: Falls - Risk of  Goal: *Absence of Falls  Description  Document Geeta Fall Risk and appropriate interventions in the flowsheet. Outcome: Resolved/Not Met  Note:   Fall Risk Interventions:  Mobility Interventions: Bed/chair exit alarm, Patient to call before getting OOB, PT Consult for mobility concerns         Medication Interventions: Patient to call before getting OOB, Teach patient to arise slowly                   Problem: Patient Education: Go to Patient Education Activity  Goal: Patient/Family Education  Outcome: Resolved/Not Met     Problem: Pressure Injury - Risk of  Goal: *Prevention of pressure injury  Description  Document Zach Scale and appropriate interventions in the flowsheet.   Outcome: Resolved/Not Met  Note:   Pressure Injury Interventions:  Sensory Interventions: Assess changes in LOC, Float heels, Keep linens dry and wrinkle-free         Activity Interventions: Chair cushion, Increase time out of bed, PT/OT evaluation    Mobility Interventions: Chair cushion, Float heels, PT/OT evaluation    Nutrition Interventions: Document food/fluid/supplement intake, Discuss nutritional consult with provider, Offer support with meals,snacks and hydration                     Problem: Patient Education: Go to Patient Education Activity  Goal: Patient/Family Education  Outcome: Resolved/Not Met     Problem: Infection - Risk of, Central Venous Catheter-Associated Bloodstream Infection  Goal: *Absence of infection signs and symptoms  Outcome: Resolved/Not Met     Problem: Patient Education: Go to Patient Education Activity  Goal: Patient/Family Education  Outcome: Resolved/Not Met

## 2019-07-22 NOTE — PROGRESS NOTES
Reason for Admission:  Respiratory failure w/ hypoxia due to malignant metastatic cancer                   RRAT Score:  17                Do you (patient/family) have any concerns for transition/discharge? Non indicated at this time                 Plan for utilizing home health:  None. Plan to receive home hospice services. Current Advanced Directive/Advance Care Plan:  ACP on file. DNR code. Transition of Care Plan:  Home with hospice services    CM met with patient and his wife at the bedside to introduce role, verify demographics, and discuss discharge planning. Patient was awake and alert/oriented x4. Pt is a 76 yo  male admitted to [facility] on 7/18/19 for acute respiratory failure due to malignant metastatic lung cancer. Pt is ACO and on the Oncology bundle. Pt and family have elected for home hospice services at this time. A referral has jeramy placed to [facility] to assess for admission. Pt lives with his wife who is his primary caregiver. Pt has an adult daughter who is also supportive. Pt is ambulatory and declines concern for returning home. CM will continue to follow and facilitate an appropriate KATRINA plan. PCP: Dr. Katheleen Lanes  Oncologist: Dr. Merlin Lyon MD: Dr. Harvey Marino: Alejandra Farley  Oncology Nurse Navigator: Darrius Favors: [address] Management Interventions  PCP Verified by CM: Yes(Dr. Katheleen Lanes)  Mode of Transport at Discharge:  Other (see comment)(wife)  Transition of Care Consult (CM Consult): Sury: Yes  MyChart Signup: No  Discharge Durable Medical Equipment: No  Physical Therapy Consult: No  Occupational Therapy Consult: No  Speech Therapy Consult: No  Current Support Network: Lives with Spouse, Own Home(lives with wife)  Confirm Follow Up Transport: Family(wife will transport)  Plan discussed with Pt/Family/Caregiver: Yes(dicsussed with pt and wife at the bedside)  Freedom of Choice Offered: Yes  Discharge Location  Discharge Placement: Home with hospice    ADRIÁN Oconnor  Care Manager  [phone]

## 2019-07-22 NOTE — PROGRESS NOTES
Bedside shift change report GIVEN TO , RN. Report included the following information SBAR. SIGNIFICANT CHANGES DURING SHIFT:    0700  Report received from Codey Poole  Pt off floor, to get Chest Xray      CONCERNS TO ADDRESS WITH MD:            [facility] NURSING NOTE   Admission Date 7/18/2019   Admission Diagnosis Acute respiratory failure with hypoxia ([address] [J96.01]   Consults IP CONSULT TO PALLIATIVE CARE - PROVIDER  IP CONSULT TO HEMATOLOGY  IP CONSULT TO PRIMARY CARE PROVIDER  IP CONSULT TO HOSPITALIST      Cardiac Monitoring [x] Yes [] No      Purposeful Hourly Rounding [x] Yes    Geeta Score Total Score: 2   Geeta score 3 or > [x] Bed Alarm [] Avasys [] 1:1 sitter [] Patient refused (Signed refusal form in chart)   Zach Score Zach Score: 18   Zach score 14 or < [] PMT consult [] Wound Care consult    []  Specialty bed  [] Nutrition consult      Influenza Vaccine Received Flu Vaccine for Current Season (usually Sept-March): Not Flu Season           Oxygen needs? [x] Room air Oxygen @  []1L    []2L    []3L   []4L    []5L   []6L via  NC   Chronic home O2 use?  [] Yes [] No  Perform O2 challenge test and document in progress note using smartphrase (.Homeoxygen)      Last bowel movement Last Bowel Movement Date: 07/21/19      Urinary Catheter             LDAs             Venous Access Device smart port LOT [id number] 03/14/19 Upper chest (subclavicular area, right (Active)   Central Line Being Utilized Yes 7/22/2019  8:12 AM   Criteria for Appropriate Use Limited/no vessel suitable for conventional peripheral access 7/22/2019  8:12 AM   Site Assessment Clean, dry, & intact 7/22/2019  8:12 AM   Date of Last Dressing Change 07/18/19 7/22/2019  8:12 AM   Dressing Status Clean, dry, & intact 7/22/2019  8:12 AM   Dressing Type Disk with Chlorhexadine gluconate (CHG) 7/22/2019  8:12 AM   Action Taken Blood drawn 7/19/2019  3:48 PM   Positive Blood Return (Medial Site) Yes 7/21/2019  7:40 PM   Alcohol Cap Used Yes 7/21/2019  1:55 PM            Drain Aspira Drain 07/19/19 Right (Active)   Site Assessment Clean, dry, & intact 7/22/2019  8:12 AM   Dressing Status Clean, dry, & intact 7/22/2019  8:12 AM   Status Clamped 7/22/2019  8:12 AM   Drainage Description Serosanguinous 7/21/2019  7:40 PM                   Readmission Risk Assessment Tool Score Medium Risk            17       Total Score        3 Has Seen PCP in Last 6 Months (Yes=3, No=0)    5 Pt. Coverage (Medicare=5 , Medicaid, or Self-Pay=4)    9 Charlson Comorbidity Score (Age + Comorbid Conditions)        Criteria that do not apply:    . Living with Significant Other. Assisted Living. LTAC. SNF.  or   Rehab    Patient Length of Stay (>5 days = 3)    IP Visits Last 12 Months (1-3=4, 4=9, >4=11)       Expected Length of Stay 5d 7h   Actual Length of Stay 4

## 2019-07-22 NOTE — PROGRESS NOTES
CM acknowledged consult for hospice. Referral sent to [facility] via [facility]. Stephanie Johnson will follow-up with hospice liaison.      ADRIÁN Kirk  Care Manager  [phone]

## 2019-07-22 NOTE — PROGRESS NOTES
General Daily Progress Note    Admit Date: 7/18/2019  Hospital day 5    Subjective:     Patient has no complaint of shortness of breath at rest. No pain from catheter. ..   Medication side effects: none    Current Facility-Administered Medications   Medication Dose Route Frequency    fluticasone propionate (FLONASE) 50 mcg/actuation nasal spray 1 Spray  1 Spray Both Nostrils DAILY    fentaNYL citrate (PF) injection 50 mcg  50 mcg IntraVENous Q4H PRN    sodium chloride (NS) flush 5-40 mL  5-40 mL IntraVENous Q8H    sodium chloride (NS) flush 5-40 mL  5-40 mL IntraVENous PRN    acetaminophen (TYLENOL) tablet 650 mg  650 mg Oral Q6H PRN    ondansetron (ZOFRAN) injection 4 mg  4 mg IntraVENous Q4H PRN    nitroglycerin (NITROBID) 2 % ointment 1 Inch  1 Inch Topical Q6H PRN    albuterol-ipratropium (DUO-NEB) 2.5 MG-0.5 MG/3 ML  3 mL Nebulization Q6H RT    ALPRAZolam (XANAX) tablet 1 mg  1 mg Oral BID PRN    amLODIPine (NORVASC) tablet 5 mg  5 mg Oral DAILY    folic acid (FOLVITE) tablet 1 mg  1 mg Oral DAILY    levothyroxine (SYNTHROID) tablet 50 mcg  50 mcg Oral ACB    lidocaine (XYLOCAINE) 4 % cream   Topical PRN    pantoprazole (PROTONIX) tablet 40 mg  40 mg Oral DAILY    pravastatin (PRAVACHOL) tablet 40 mg  40 mg Oral DAILY        Review of Systems  Pertinent items are noted in HPI. Objective:     Patient Vitals for the past 8 hrs:   BP Temp Pulse Resp SpO2   07/22/19 0741 142/66 97.5 °F (36.4 °C) 83 16 91 %   07/22/19 0341 112/61 97.3 °F (36.3 °C) 79 18 90 %   07/22/19 0259     92 %     No intake/output data recorded.   07/20 1901 - 07/22 0700  In: 720 [P.O.:720]  Out: 2250 [Urine:2250]    Physical Exam:   Visit Vitals  /66 (BP 1 Location: Right arm, BP Patient Position: At rest)   Pulse 83   Temp 97.5 °F (36.4 °C)   Resp 16   Ht 5' 10\" (1.778 m)   Wt 130 lb (59 kg)   SpO2 91%   BMI 18.65 kg/m²     Lungs: clear to auscultation bilaterally  Heart: regular rate and rhythm, S1, S2 normal, no murmur, click, rub or gallop  Abdomen: soft, non-tender. Bowel sounds normal. No masses,  no organomegaly  Extremities: extremities normal, atraumatic, no cyanosis or edema      ECG: normal sinus rhythm     Data Review   Recent Results (from the past 24 hour(s))   CBC WITH AUTOMATED DIFF    Collection Time: 07/22/19  3:17 AM   Result Value Ref Range    WBC 7.6 4.1 - 11.1 K/uL    RBC 3.73 (L) 4.10 - 5.70 M/uL    HGB 10.9 (L) 12.1 - 17.0 g/dL    HCT 32.5 (L) 36.6 - 50.3 %    MCV 87.1 80.0 - 99.0 FL    MCH 29.2 26.0 - 34.0 PG    MCHC 33.5 30.0 - 36.5 g/dL    RDW 13.9 11.5 - 14.5 %    PLATELET 404 768 - 598 K/uL    MPV 9.4 8.9 - 12.9 FL    NRBC 0.0 0  WBC    ABSOLUTE NRBC 0.00 0.00 - 0.01 K/uL    NEUTROPHILS 76 (H) 32 - 75 %    LYMPHOCYTES 7 (L) 12 - 49 %    MONOCYTES 7 5 - 13 %    EOSINOPHILS 9 (H) 0 - 7 %    BASOPHILS 0 0 - 1 %    IMMATURE GRANULOCYTES 1 (H) 0.0 - 0.5 %    ABS. NEUTROPHILS 5.8 1.8 - 8.0 K/UL    ABS. LYMPHOCYTES 0.5 (L) 0.8 - 3.5 K/UL    ABS. MONOCYTES 0.5 0.0 - 1.0 K/UL    ABS. EOSINOPHILS 0.7 (H) 0.0 - 0.4 K/UL    ABS. BASOPHILS 0.0 0.0 - 0.1 K/UL    ABS. IMM.  GRANS. 0.1 (H) 0.00 - 0.04 K/UL    DF AUTOMATED      RBC COMMENTS ASHA CELLS  PRESENT       METABOLIC PANEL, BASIC    Collection Time: 07/22/19  3:17 AM   Result Value Ref Range    Sodium 135 (L) 136 - 145 mmol/L    Potassium 3.4 (L) 3.5 - 5.1 mmol/L    Chloride 103 97 - 108 mmol/L    CO2 25 21 - 32 mmol/L    Anion gap 7 5 - 15 mmol/L    Glucose 98 65 - 100 mg/dL    BUN 10 6 - 20 MG/DL    Creatinine 0.58 (L) 0.70 - 1.30 MG/DL    BUN/Creatinine ratio 17 12 - 20      GFR est AA >60 >60 ml/min/1.73m2    GFR est non-AA >60 >60 ml/min/1.73m2    Calcium 8.1 (L) 8.5 - 10.1 MG/DL           Assessment:     Active Problems:    HTN (hypertension) ()      Hyperlipidemia ()      Laryngeal squamous cell carcinoma (HCC) (8/19/2016)      Acquired hypothyroidism (3/5/2018)      Acute respiratory failure with hypoxia (HCC) (7/18/2019)      GERD (gastroesophageal reflux disease) (7/18/2019)      Anxiety disorder (7/18/2019)      Lung cancer, primary, with metastasis from lung to other site, right ([address] (7/18/2019)        Plan:     1. Recurrent R pleural effusion-sp Pleurx catheter insertion. will recheck CXR this am.   2. Metastatic lung cancer to bone and pleural space. Apparently not toerating chem or immunotherapy that well. 3. Disposition- possible dc home later today after pt and wife meet with hospice. 4. Oxygen supplementation- his O2 sat on room air is 90. Will have walk in [place] again this am, determine if he needs home oxygen or not.

## 2019-07-22 NOTE — PROGRESS NOTES
ADULT PROTOCOL: JET AEROSOL ASSESSMENT    Patient  Tarah Henao     75 y.o.   male     7/21/2019  10:50 PM    Breath Sounds Pre Procedure: Right Breath Sounds: Clear                               Left Breath Sounds: Clear    Breath Sounds Post Procedure: Right Breath Sounds: Diminished                                 Left Breath Sounds: Diminished    Breathing pattern: Pre procedure Breathing Pattern: Regular          Post procedure Breathing Pattern: Regular    Heart Rate: Pre procedure Pulse: 85           Post procedure Pulse: 82    Resp Rate: Pre procedure Respirations: 20           Post procedure Respirations: 20    Peak Flow: Pre bronchodilator             Post bronchodilator       FVC/FEV1:  N/A    Incentive Spirometry:             Cough: Pre procedure Cough: Non-productive               Post procedure      Suctioned: NO    Sputum: Pre procedure                   Post procedure      Oxygen: O2 Device: Room air   room air     Changed: NO    SpO2: Pre procedure SpO2: 90 %   without oxygen              Post procedure SpO2: 94 %  without oxygen    Nebulizer Therapy: Current medications Aerosolized Medications: DuoNeb      Changed: NO    Smoking History:     Problem List:   Patient Active Problem List   Diagnosis Code    HTN (hypertension) I10    Hyperlipidemia E78.5    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) C34.91       Respiratory Therapist: RT Dhiraj

## 2019-07-22 NOTE — DISCHARGE INSTRUCTIONS
PATIENT DISCHARGE INSTRUCTIONS      PATIENT DISCHARGE INSTRUCTIONS    [facility] / [id number] : 1945    Admitted 2019 Discharged: 2019       · It is important that you take the medication exactly as they are prescribed. · Keep your medication in the bottles provided by the pharmacist and keep a list of the medication names, dosages, and times to be taken in your wallet. · Do not take other medications without consulting your doctor. What to do at Home    Recommended Diet: Regular Diet    Recommended Activity: Activity as tolerated    If you experience any of the following symptoms increasing shortness of breath, please follow up with Dr. Kevon Renee.

## 2019-07-22 NOTE — PROGRESS NOTES
Bedside report received from [place], [address] [place] [address]. Assumed care of patient.

## 2019-07-22 NOTE — PROGRESS NOTES
Palliative Medicine Consult  Ancelmo: [phone] ([phone])    Patient Name: Castillo Drake  YOB: 1945    Date of Initial Consult: 7/18/2019   Reason for Consult: Goals of Care Discussion  Requesting Provider: Dr. Sotelo Officer  Primary Care Physician: Javi Thornton MD     SUMMARY:   Castillo Drake is a 75 y.o. with a past history of squamouse cell carcinoma of larynx (dx 2016, s/p radiation and chemo, now in remission), Lung Cancer with mets to the bones (dx 3/2019) ,Right sided pleural effusion s/p thoracentesis 7/2019, HTN, Hypothyroidism, GERD, Anxiety who was admitted on 7/18/2019 from home with a diagnosis of Reoccurring Malignant Pleural Effusion. Patient presented to the ER with CC of shortness of breath. At the time of our visit, patient is still in ER, has been placed on supplemental O2, resulting in improved o2 sats from mid 80s to low 90s. Patient has not yet had Chest Xray. Spoke with ER physician Dr. Jennifer Dooley, she stated that they will likely admit patient based on his need for O2 supplementation. Patients primary oncologist is Dr. Evelyne Sorensen, he last saw patient early July. Patient has also seen Dr. Santa Segovia with out patient palliative medicine x2, last appointment 5/2019. Current medical issues leading to Palliative Medicine involvement include: Goals of Care in setting of ES Disease     PALLIATIVE DIAGNOSES:   1. Advanced care planning discussion  2. Goals of care discussion  3. DNR discussion  4. Shortness of breath  5. fatigue       PLAN:     1. Shortness of breath-relieved after thoracentesis. He feels this is a good idea and this will help him drain the fluid which contributes to shortness of breath. 2. Goals of care-we talked about his course of treatment over the last several months and his step-by-step decline. He says that he is happy for the time that he has had because he is given him the opportunity to accept what is going on.   Wife remained quiet throughout the conversation and answer questions only when asked directly. She has many questions about what his care will look like. [place] she is worried that she will not have enough help. They are meeting with hospice later today to discuss all of the above. 3. Wife finally shares the stressors that she has been going through with several family members with cancer including their daughter who lost her  to metastatic melanoma. Patient reassures his wife that he will be all right. 4. dispo- Home with hospice. Palliative medicine will follow peripherally until discharge. 5. Initial consult note routed to primary continuity provider and/or primary health care team members  6. Communicated plan of care with: Palliative [facility] Team, Leann Li NP     GOALS OF CARE / TREATMENT PREFERENCES:     GOALS OF CARE:  Patient/Health Care Proxy Stated Goals: Prolong life    TREATMENT PREFERENCES:   Code Status: DNR    Advance Care Planning:  [x] [facility] Interdisciplinary Team has updated the ACP Navigator with Health Care Decision Maker and Patient Capacity      Primary Decision Maker: Carissa Kan - [phone]  Advance Care Planning 7/19/2019   Patient's Healthcare Decision Maker is: Named in scanned ACP document   Confirm Advance Directive Yes, on file   Patient Would Like to Complete Advance Directive -       Medical Interventions: Limited additional interventions     Other Instructions:  Artificially Administered Nutrition: No feeding tube     Other:    As far as possible, the palliative care team has discussed with patient / health care proxy about goals of care / treatment preferences for patient.      HISTORY:     History obtained from medical records and patient    CHIEF COMPLAINT: Denies    HPI/SUBJECTIVE:    The patient is:   [x] Verbal and participatory  [] Non-participatory due to:     He is resting comfortably, not on oxygen, friend is visiting and he is chatting away. See palliative ESAS for symptoms    Clinical Pain Assessment (nonverbal scale for severity on nonverbal patients):   Clinical Pain Assessment  Severity: 0          Duration: for how long has pt been experiencing pain (e.g., 2 days, 1 month, years)  Frequency: how often pain is an issue (e.g., several times per day, once every few days, constant)     FUNCTIONAL ASSESSMENT:     Palliative Performance Scale (PPS):  PPS: 50       PSYCHOSOCIAL/SPIRITUAL SCREENING:     Palliative IDT has assessed this patient for cultural preferences / practices and a referral made as appropriate to needs (Cultural Services, Patient Advocacy, Ethics, etc.)    Any spiritual / Alevism concerns:  [] Yes /  [x] No    Caregiver Burnout:  [] Yes /  [x] No /  [] No Caregiver Present      Anticipatory grief assessment:   [x] Normal  / [] Maladaptive       ESAS Anxiety: Anxiety: 0    ESAS Depression: Depression: 0        REVIEW OF SYSTEMS:     Positive and pertinent negative findings in ROS are noted above in HPI. The following systems were [x] reviewed / [] unable to be reviewed as noted in HPI  Other findings are noted below. Systems: constitutional, ears/nose/mouth/throat, respiratory, gastrointestinal, genitourinary, musculoskeletal, integumentary, neurologic, psychiatric, endocrine. Positive findings noted below. Modified ESAS Completed by: provider   Fatigue: 2 Drowsiness: 0   Depression: 0 Pain: 0   Anxiety: 0 Nausea: 0   Anorexia: 4 Dyspnea: 0           Stool Occurrence(s): 1        PHYSICAL EXAM:     From RN flowsheet:  Wt Readings from Last 3 Encounters:   07/18/19 130 lb (59 kg)   07/02/19 134 lb (60.8 kg)   07/01/19 133 lb 14.4 oz (60.7 kg)     Blood pressure 113/71, pulse 88, temperature 97.5 °F (36.4 °C), resp. rate 18, height 5' 10\" (1.778 m), weight 130 lb (59 kg), SpO2 94 %.     Pain Scale 1: Numeric (0 - 10)  Pain Intensity 1: 0  Pain Onset 1: chronic  Pain Location 1: Back  Pain Orientation 1: Medial  Pain Description 1: Aching  Pain Intervention(s) 1: Medication (see MAR)  Last bowel movement, if known:     Constitutional: Appears younger than stated age, under nourished, thin and frail, pleasant, NAD  Eyes: pupils equal, anicteric  ENMT: no nasal discharge, moist mucous membranes  Cardiovascular: regular rhythm, distal pulses intact  Respiratory: breathing not labored, symmetric, on nasal cannula medical records and patient  Gastrointestinal: soft non-tender, +bowel sounds  Musculoskeletal: no deformity, no tenderness to palpation  Skin: warm, dry  Neurologic: following commands, moving all extremities  Psychiatric: full affect, no hallucinations  Other:       HISTORY:     Active Problems:    HTN (hypertension) ()      Hyperlipidemia ()      Laryngeal squamous cell carcinoma (HCC) (8/19/2016)      Acquired hypothyroidism (3/5/2018)      Acute respiratory failure with hypoxia (HCC) (7/18/2019)      GERD (gastroesophageal reflux disease) (7/18/2019)      Anxiety disorder (7/18/2019)      Lung cancer, primary, with metastasis from lung to other site, right ([address] (7/18/2019)      Past Medical History:   Diagnosis Date    Acquired hypothyroidism 3/5/2018    HTN (hypertension)     Hypercholesterolemia     Hyperlipidemia     Laryngeal squamous cell carcinoma ([address] 2016    Subclinical hypothyroidism       Past Surgical History:   Procedure Laterality Date    HX ORTHOPAEDIC      Back surgery x3    IR INSERT CATH PLEURAL INDWELL  7/19/2019    IR INSERT TUNL CVC W PORT OVER 5 YEARS  3/14/2019      Family History   Problem Relation Age of Onset    Dementia Mother     Alcohol abuse Father       History reviewed, no pertinent family history.   Social History     Tobacco Use    Smoking status: Former Smoker    Smokeless tobacco: Never Used   Substance Use Topics    Alcohol use: No     Allergies   Allergen Reactions    Morphine Rash    Lisinopril Other (comments)     dizziness    Propranolol Other (comments)     fatigue      Current Facility-Administered Medications   Medication Dose Route Frequency    albuterol-ipratropium (DUO-NEB) 2.5 MG-0.5 MG/3 ML  3 mL Nebulization TID RT    fluticasone propionate (FLONASE) 50 mcg/actuation nasal spray 1 Spray  1 Spray Both Nostrils DAILY    fentaNYL citrate (PF) injection 50 mcg  50 mcg IntraVENous Q4H PRN    sodium chloride (NS) flush 5-40 mL  5-40 mL IntraVENous Q8H    sodium chloride (NS) flush 5-40 mL  5-40 mL IntraVENous PRN    acetaminophen (TYLENOL) tablet 650 mg  650 mg Oral Q6H PRN    ondansetron (ZOFRAN) injection 4 mg  4 mg IntraVENous Q4H PRN    nitroglycerin (NITROBID) 2 % ointment 1 Inch  1 Inch Topical Q6H PRN    ALPRAZolam (XANAX) tablet 1 mg  1 mg Oral BID PRN    amLODIPine (NORVASC) tablet 5 mg  5 mg Oral DAILY    folic acid (FOLVITE) tablet 1 mg  1 mg Oral DAILY    levothyroxine (SYNTHROID) tablet 50 mcg  50 mcg Oral ACB    lidocaine (XYLOCAINE) 4 % cream   Topical PRN    pantoprazole (PROTONIX) tablet 40 mg  40 mg Oral DAILY    pravastatin (PRAVACHOL) tablet 40 mg  40 mg Oral DAILY          LAB AND IMAGING FINDINGS:     Lab Results   Component Value Date/Time    WBC 7.6 07/22/2019 03:17 AM    HGB 10.9 (L) 07/22/2019 03:17 AM    PLATELET 554 50/93/3028 03:17 AM     Lab Results   Component Value Date/Time    Sodium 135 (L) 07/22/2019 03:17 AM    Potassium 3.4 (L) 07/22/2019 03:17 AM    Chloride 103 07/22/2019 03:17 AM    CO2 25 07/22/2019 03:17 AM    BUN 10 07/22/2019 03:17 AM    Creatinine 0.58 (L) 07/22/2019 03:17 AM    Calcium 8.1 (L) 07/22/2019 03:17 AM    Magnesium 1.6 10/18/2016 10:24 AM    Phosphorus 2.1 (L) 07/01/2019 08:16 AM      Lab Results   Component Value Date/Time    AST (SGOT) 18 07/19/2019 03:07 AM    Alk.  phosphatase 73 07/19/2019 03:07 AM    Protein, total 7.0 07/19/2019 03:07 AM    Albumin 2.8 (L) 07/19/2019 03:07 AM    Globulin 4.2 (H) 07/19/2019 03:07 AM     No results found for: INR, PTMR, PTP, PT1, PT2, APTT   No results found for: IRON, FE, TIBC, IBCT, PSAT, FERR   No results found for: PH, PCO2, PO2  No components found for: GLPOC   No results found for: CPK, CKMB             Total time: 70 min  Counseling / coordination time, spent as noted above: 55 min  > 50% counseling / coordination?: yes    Prolonged service was provided for  []30 min   []75 min in face to face time in the presence of the patient, spent as noted above. Time Start:   Time End:   Note: this can only be billed with 43308 (initial) or 31450 (follow up). If multiple start / stop times, list each separately.

## 2019-07-22 NOTE — HOSPICE
[facility]  [phone]    Inpatient Nursing PRE Admission   Patient Name: Alaine Severs  YOB: 1945  Age: 75 y.o. Date of PLANNED Hospice Admission: 19  Hospice Attending: Delphine De La Vega MD  Primary Care Physician: Delphine De La Vega MD     Home Hospice Zip Code: [id number]  Expected  (if patient transferred to [facility]): TBD    ADVANCE CARE PLANNING    Code Status: DNR   Durable DNR: []  Yes  [x]  No  Advance Care Planning 2019   Patient's Healthcare Decision Maker is: Named in scanned ACP document   Confirm Advance Directive Yes, on file   Patient Would Like to Complete Advance Directive -       Christian: Scientologist   Home: TBD    HOSPICE SUMMARY   ER Visits/ Hospitalizations in past year: 2  Hospice Diagnosis: Metastatic lung cancer to bone and pleural space. Onset Date of Hospice Diagnosis:2019   Summary of Disease Progression   Leading to Hospice Diagnosis:   Dr. Princess Garcia consult report- 19  Alaine Severs is a 75 y.o. with a past history of squamouse cell carcinoma of larynx (dx 2016, s/p radiation and chemo, now in remission), Lung Cancer with mets to the bones (dx 3/2019) ,Right sided pleural effusion s/p thoracentesis 2019, HTN, Hypothyroidism, GERD, Anxiety who was admitted on 2019 from home with a diagnosis of Reoccurring Malignant Pleural Effusion.     Patient presented to the ER with CC of shortness of breath.     At the time of our visit, patient is still in ER, has been placed on supplemental O2, resulting in improved o2 sats from mid 80s to low 90s. Patient has not yet had Chest Xray. Spoke with ER physician Dr. Moustapha Jalloh, she stated that they will likely admit patient based on his need for O2 supplementation.     Patients primary oncologist is Dr. Lily Puga, he last saw patient early July. Patient has also seen Dr. Reyna Powell with out patient palliative medicine x2, last appointment 2019. 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    Subclinical hypothyroidism E03.9    Acquired hypothyroidism E03.9    Acute respiratory failure with hypoxia (HCC) J96.01    GERD (gastroesophageal reflux disease) K21.9    Anxiety disorder F41.9    Lung cancer, primary, with metastasis from lung to other site, right ([address] C34.91     Diagnoses RELATED to the terminal prognosis: Laryngeal squamous cell carcinoma, Acute respiratory failure w/hypoxia  Other Diagnoses: HTN, Hyperlipidemia, Acquired hypothyroidism, GERD, Anxiety disorder    Rationale for a prognosis of life expectancy of 6 months or less if the disease follows its normal course (Disease Specific History):     Storm Caldwell is a 75 y.o. who was admitted to [facility]. The patient's principle diagnosis of lung ca with mets to bone and pleural space has resulted in hospice admission. Functionally, the patient's Palliative Performance Scale has declined over a period of 4 months and is estimated at 50. Objective information that support this patients limited prognosis includes: Not tolerating chemo or immunotherapy well. See vitals and labs below. The patient/family chose comfort measures with the support of Hospice. Verbal certification of terminal diagnosis with life expectancy of 6 months or less received from: Juve Astudillo MD     Prognosis estimated based on 07/22/19 clinical assessment is:   [x] Few to Many Weeks       CLINICAL INFORMATION   Allergies:    Allergies   Allergen Reactions    Morphine Rash    Lisinopril Other (comments)     dizziness    Propranolol Other (comments)     fatigue       Wt Readings from Last 3 Encounters:   07/18/19 59 kg (130 lb)   07/02/19 60.8 kg (134 lb)   07/01/19 60.7 kg (133 lb 14.4 oz)     Ht Readings from Last 3 Encounters:   07/18/19 5' 10\" (1.778 m)   07/02/19 5' 10\" (1.778 m)   07/01/19 5' 10\" (1.778 m)     Body mass index is 18.65 kg/m². Visit Vitals  /71 (BP 1 Location: Right arm, BP Patient Position: Sitting; At rest)   Pulse 88   Temp 97.5 °F (36.4 °C)   Resp 18   Ht 5' 10\" (1.778 m)   Wt 59 kg (130 lb)   SpO2 94%   BMI 18.65 kg/m²       LAB VALUES      CBC WITH AUTOMATED DIFF    Collection Time: 07/22/19  3:17 AM   Result Value Ref Range    WBC 7.6 4.1 - 11.1 K/uL    RBC 3.73 (L) 4.10 - 5.70 M/uL    HGB 10.9 (L) 12.1 - 17.0 g/dL    HCT 32.5 (L) 36.6 - 50.3 %    MCV 87.1 80.0 - 99.0 FL    MCH 29.2 26.0 - 34.0 PG    MCHC 33.5 30.0 - 36.5 g/dL    RDW 13.9 11.5 - 14.5 %    PLATELET 982 073 - 724 K/uL    MPV 9.4 8.9 - 12.9 FL    NRBC 0.0 0  WBC    ABSOLUTE NRBC 0.00 0.00 - 0.01 K/uL    NEUTROPHILS 76 (H) 32 - 75 %    LYMPHOCYTES 7 (L) 12 - 49 %    MONOCYTES 7 5 - 13 %    EOSINOPHILS 9 (H) 0 - 7 %    BASOPHILS 0 0 - 1 %    IMMATURE GRANULOCYTES 1 (H) 0.0 - 0.5 %    ABS. NEUTROPHILS 5.8 1.8 - 8.0 K/UL    ABS. LYMPHOCYTES 0.5 (L) 0.8 - 3.5 K/UL    ABS. MONOCYTES 0.5 0.0 - 1.0 K/UL    ABS. EOSINOPHILS 0.7 (H) 0.0 - 0.4 K/UL    ABS. BASOPHILS 0.0 0.0 - 0.1 K/UL    ABS. IMM. GRANS. 0.1 (H) 0.00 - 0.04 K/UL    DF AUTOMATED      RBC COMMENTS ASHA CELLS  PRESENT         No results found for this visit on 07/18/19 (from the past 6 hour(s)). Lab Results   Component Value Date/Time    Protein, total 7.0 07/19/2019 03:07 AM    Albumin 2.8 (L) 07/19/2019 03:07 AM       Currently this patient has:  [x] Supplemental O2  [x] PORT       Progression to DEPENDENCE WITH ADLs (include time frame): Ambulates with assistance. - Dependent for bathing: personal hygiene and grooming   - Dependent for dressing: dressing and undressing   - Dependent for transferring: movement and mobility   - Dependent for toileting: continence-related tasks including control and hygiene   - Dependent for eating: preparing food and feeding    ASSESSMENT & PLAN     1.  Symptom Issues Identified: SOB with exertion. Anorexia. Denies pain. 2. Spiritual Issues Identified: None. Has a  who plans to visit. 3.  Psych/ Social/ Emotional Issues Identified: Wife needs support with caring for patient at home. Training on how to operate pleural drain. Needs time to take of self once patient is unable to be left alone. Has a daughter who live within walking distance. 4.  Care Coordination:   Transfer to: home    Report given to: Admission team and administration    Transportation by: Wife in private vehicle   Scheduled for 1600    Medications Needs: TBD    DME: Oxygen set up, concentrator and tanks. Shower chair.      Supplies: TBD

## 2019-07-22 NOTE — PROGRESS NOTES
Patient discharged. Pt given follow-up instructions, medications list and prescriptions. Pt able to demonstrate understanding of discharge instructions. Pt port de-accessed and Telemonitor removed. Pt left with all personal belongings. Pt escorted off unit via wheelchair by volunteer.

## 2019-07-23 NOTE — DISCHARGE SUMMARY
[address] [place] [address] SUMMARY    Name:  Paul Santa  MR#:  [id number]  :  1945  ACCOUNT #:  [de-identified]  ADMIT DATE:  2019  DISCHARGE DATE:  2019      DISCHARGE DIAGNOSES:  1. Acute hypoxic respiratory failure. 2.  Malignant recurrent large pleural effusion on the right side. 3.  Hypertension. 4.  Hyperlipidemia. 5.  Past history of laryngeal squamous cell carcinoma. 6.  Lung cancer with mets to bone and pleural space. 7.  Anxiety. PROCEDURES:  Insertion of PleurX catheter. The patient was discharged to hospice care. DISCHARGE MEDICATIONS:  Zofran 4 mg p.r.n., folic acid 1 mg daily, alprazolam 1 mg half to one tablet every 12 hours as needed, Synthroid 50 mcg daily, lidocaine topical cream, Protonix 40 mg daily. CONSULTATIONS:  Oncology Dr. Katiuska Christine and interventional radiologist was Dr. Lazarus Banister. HISTORY OF PRESENT ILLNESS:  The patient is a very pleasant 26-year-old white male who came with shortness of breath. The patient has past history of laryngeal cancer, he has been chemo. He also began having shortness of breath one month ago, which got worse. He had a thoracentesis of his right lung two weeks ago. He began to have progressive shortness of breath prior over the last week, came back to emergency room and was found to have a recurrent large right pleural effusion. PAST MEDICAL HISTORY:  Significant for hypothyroidism, hypertension, hypercholesterolemia, hyperlipidemia, squamous cell carcinoma of the larynx, subclinical hypothyroidism, recent diagnosis of metastatic lung cancer based on above, bone biopsy and recurrent pleural effusion, cytology positive for lung cancer. PAST SURGICAL HISTORY:  Back surgery x3. SOCIAL HISTORY:  The patient is a former smoker, alcohol use is negative. ALLERGIES:  TO MORPHINE CAUSING RASH, LISINOPRIL, PROPRANOLOL.     MEDICATIONS ON ADMISSION:  Protonix 40 mg daily, lidocaine cream p.r.n., Zofran 4 mg p.r.n., folic acid 1 mg daily, alprazolam 1 mg half to one tablet daily or every 12 hours, Synthroid 50 mcg daily, pravastatin 40 mg daily, amlodipine 5 mg daily. REVIEW OF SYSTEMS:  Please see HPI. PHYSICAL EXAMINATION:  VITAL SIGNS:  Blood pressure 142/73, pulse 103, temperature 98.5, respiratory rate 18, height 5 feet 10 inches, weight 130. GENERAL:  Alert and cooperative. NECK:  Supple and symmetrical.  Thyroid nontender. LUNGS:  Decreased breath sounds on the right side. No wheezing. No rhonchi. CARDIOVASCULAR:  Regular rhythm and rate. No murmurs, thrills, rubs, or gallops. No edema. ABDOMEN:  Soft, nontender, nondistended. NEUROLOGIC:  No aphasia or slurred speech. HOSPITAL COURSE:  The patient was admitted, seen in consultation by Interventional Radiology, had a thoracentesis and placement of a PleurX catheter. The patient was also seen in consultation by Dr. Meg Murphy. The patient did not tolerate his chemotherapy and also had not tolerated immunotherapy very well. So with a poor prognosis that he was no longer a candidate for any type of systemic treatment, was started on hospice, and was discharged home on 07/22/2019.         Jennifer Tomas MD      [id number]  D:  07/22/2019 17:48  T:  07/23/2019 2:31  JOB #:  [id number]

## 2019-07-23 NOTE — PROGRESS NOTES
7/23/19  This ARTHUR allen Navigator spoke with Tamiko, agent [facility]. Pt is receiving Home Hospice services. Start of care was completed on 7/22/19. No further Nurse Navigator follow up is scheduled.  CLARISSA

## 2020-01-01 ENCOUNTER — HOME CARE VISIT (OUTPATIENT)
Dept: HOSPICE | Facility: HOSPICE | Age: 75
End: 2020-01-01
Payer: MEDICARE

## 2020-01-01 ENCOUNTER — HOME CARE VISIT (OUTPATIENT)
Dept: SCHEDULING | Facility: HOME HEALTH | Age: 75
End: 2020-01-01
Payer: MEDICARE

## 2020-01-01 VITALS
RESPIRATION RATE: 16 BRPM | DIASTOLIC BLOOD PRESSURE: 75 MMHG | HEART RATE: 73 BPM | SYSTOLIC BLOOD PRESSURE: 105 MMHG | TEMPERATURE: 97.1 F

## 2020-01-01 VITALS — HEART RATE: 78 BPM | RESPIRATION RATE: 24 BRPM

## 2020-01-01 VITALS — DIASTOLIC BLOOD PRESSURE: 60 MMHG | SYSTOLIC BLOOD PRESSURE: 100 MMHG

## 2020-01-01 VITALS — HEART RATE: 112 BPM | DIASTOLIC BLOOD PRESSURE: 50 MMHG | RESPIRATION RATE: 28 BRPM | SYSTOLIC BLOOD PRESSURE: 76 MMHG

## 2020-01-01 VITALS — RESPIRATION RATE: 22 BRPM | SYSTOLIC BLOOD PRESSURE: 105 MMHG | HEART RATE: 88 BPM | DIASTOLIC BLOOD PRESSURE: 70 MMHG

## 2020-01-01 VITALS — RESPIRATION RATE: 24 BRPM | HEART RATE: 86 BPM

## 2020-01-01 VITALS — SYSTOLIC BLOOD PRESSURE: 80 MMHG | DIASTOLIC BLOOD PRESSURE: 40 MMHG

## 2020-01-01 PROCEDURE — 0651 HSPC ROUTINE HOME CARE

## 2020-01-01 PROCEDURE — HHS10554 SHAMPOO/BODY WASH 8 OZ ALOE VESTA

## 2020-01-01 PROCEDURE — A9270 NON-COVERED ITEM OR SERVICE: HCPCS

## 2020-01-01 PROCEDURE — G0299 HHS/HOSPICE OF RN EA 15 MIN: HCPCS

## 2020-01-01 PROCEDURE — G0156 HHCP-SVS OF AIDE,EA 15 MIN: HCPCS

## 2020-01-01 PROCEDURE — T4541 LARGE DISPOSABLE UNDERPAD: HCPCS

## 2020-01-01 PROCEDURE — A4927 NON-STERILE GLOVES: HCPCS

## 2020-01-01 PROCEDURE — HOSPICE MEDICATION HC HH HOSPICE MEDICATION

## 2020-01-01 PROCEDURE — T4526 ADULT SIZE PULL-ON MED: HCPCS

## 2020-01-01 PROCEDURE — A6250 SKIN SEAL PROTECT MOISTURIZR: HCPCS

## 2020-01-01 PROCEDURE — E0325 URINAL MALE JUG-TYPE: HCPCS

## 2020-01-01 PROCEDURE — 3331090004 HSPC SERVICE INTENSITY ADD-ON
